# Patient Record
Sex: MALE | Race: WHITE | NOT HISPANIC OR LATINO | Employment: OTHER | URBAN - METROPOLITAN AREA
[De-identification: names, ages, dates, MRNs, and addresses within clinical notes are randomized per-mention and may not be internally consistent; named-entity substitution may affect disease eponyms.]

---

## 2017-01-06 ENCOUNTER — OFFICE VISIT (OUTPATIENT)
Dept: INTERNAL MEDICINE | Facility: CLINIC | Age: 57
End: 2017-01-06

## 2017-01-06 VITALS
HEART RATE: 88 BPM | SYSTOLIC BLOOD PRESSURE: 130 MMHG | WEIGHT: 315 LBS | DIASTOLIC BLOOD PRESSURE: 90 MMHG | OXYGEN SATURATION: 95 % | BODY MASS INDEX: 40.47 KG/M2

## 2017-01-06 DIAGNOSIS — I10 ESSENTIAL HYPERTENSION: ICD-10-CM

## 2017-01-06 DIAGNOSIS — R73.03 PREDIABETES: Primary | ICD-10-CM

## 2017-01-06 DIAGNOSIS — E78.5 DYSLIPIDEMIA: ICD-10-CM

## 2017-01-06 DIAGNOSIS — F41.9 ANXIETY: ICD-10-CM

## 2017-01-06 DIAGNOSIS — K21.9 GASTROESOPHAGEAL REFLUX DISEASE, ESOPHAGITIS PRESENCE NOT SPECIFIED: ICD-10-CM

## 2017-01-06 LAB
GLUCOSE BLDC GLUCOMTR-MCNC: 95 MG/DL (ref 70–130)
HBA1C MFR BLD: 5.9 %

## 2017-01-06 PROCEDURE — 82947 ASSAY GLUCOSE BLOOD QUANT: CPT | Performed by: INTERNAL MEDICINE

## 2017-01-06 PROCEDURE — 99214 OFFICE O/P EST MOD 30 MIN: CPT | Performed by: INTERNAL MEDICINE

## 2017-01-06 PROCEDURE — 83036 HEMOGLOBIN GLYCOSYLATED A1C: CPT | Performed by: INTERNAL MEDICINE

## 2017-01-06 RX ORDER — CIPROFLOXACIN 500 MG/1
TABLET, FILM COATED ORAL
COMMUNITY
Start: 2017-01-05 | End: 2017-02-09

## 2017-01-06 RX ORDER — OMEPRAZOLE 20 MG/1
20 CAPSULE, DELAYED RELEASE ORAL DAILY
Refills: 3 | COMMUNITY
Start: 2016-11-24 | End: 2018-05-23 | Stop reason: SDUPTHER

## 2017-01-06 NOTE — MR AVS SNAPSHOT
Margarito GOEL Ian   1/6/2017 1:45 PM   Office Visit    Dept Phone:  [phone]   Encounter #:  [id number]    Provider:  Francesca Richardson DO   Department:  [facility] INTERNAL MEDICINE AND ENDOCRINOLOGY [place]                Your Full Care Plan              Your Updated Medication List          This list is accurate as of: 1/6/17  1:42 PM.  Always use your most recent med list.                ALPRAZolam 1 MG tablet   Commonly known as:  XANAX   Take 1 tablet by mouth daily.       ciprofloxacin 500 MG tablet   Commonly known as:  CIPRO       citalopram 10 MG tablet   Commonly known as:  CeleXA       cyclobenzaprine 5 MG tablet   Commonly known as:  FLEXERIL   TAKE 1 TABLET AT BEDTIME AS NEEDED.       famciclovir 500 MG tablet   Commonly known as:  FAMVIR   Take 1 tablet by mouth 3 (Three) Times a Day.       HYDROcodone-acetaminophen 7.5-325 MG per tablet   Commonly known as:  NORCO       ketoconazole 2 % shampoo   Commonly known as:  NIZORAL   APPLY TOPICALLY 2 (TWO) TIMES A WEEK.       losartan 100 MG tablet   Commonly known as:  COZAAR   TAKE 1 TABLET BY MOUTH EVERY DAY       meloxicam 7.5 MG tablet   Commonly known as:  MOBIC   TAKE 1 TABLET BY MOUTH EVERY DAY AS NEEDED       * nystatin 942496 UNIT/GM cream   Commonly known as:  MYCOSTATIN       * nystatin [id number] UNIT/GM ointment   Commonly known as:  MYCOSTATIN       omeprazole 20 MG capsule   Commonly known as:  priLOSEC       raNITIdine 150 MG tablet   Commonly known as:  ZANTAC       tamsulosin 0.4 MG capsule 24 hr capsule   Commonly known as:  FLOMAX       VENTOLIN  (90 BASE) MCG/ACT inhaler   Generic drug:  albuterol       * Notice:  This list has 2 medication(s) that are the same as other medications prescribed for you. Read the directions carefully, and ask your doctor or other care provider to review them with you.            We Performed the Following     POC Glucose Fingerstick     POC Glycated Hemoglobin, Total          You Were Diagnosed With        Codes Comments    Prediabetes    -  Primary ICD-10-CM: R73.03  ICD-9-CM: 790.29     Essential hypertension     ICD-10-CM: I10  ICD-9-CM: 401.9     Gastroesophageal reflux disease, esophagitis presence not specified     ICD-10-CM: K21.9  ICD-9-CM: 530.81     Dyslipidemia     ICD-10-CM: E78.5  ICD-9-CM: 272.4     Anxiety     ICD-10-CM: F41.9  ICD-9-CM: 300.00       Instructions     None    Patient Instructions History      Upcoming Appointments     Visit Type Date Time Department    FOLLOW UP 2017  1:45 PM [facility]    FOLLOW UP 2017 12:45 PM [facility]      Appercode Signup     [facility] Appercode allows you to send messages to your doctor, view your test results, renew your prescriptions, schedule appointments, and more. To sign up, go to [employer] and click on the Sign Up Now link in the New User? box. Enter your Appercode Activation Code exactly as it appears below along with the last four digits of your Social Security Number and your Date of Birth () to complete the sign-up process. If you do not sign up before the expiration date, you must request a new code.    Appercode Activation Code: [email or web address]  Expires: 2017  1:42 PM    If you have questions, you can email [email or web address] or call [phone] to talk to our Apperc[employer] staff. Remember, Appercode is NOT to be used for urgent needs. For medical emergencies, dial 911.               Other Info from Your Visit           Your Appointments     2017  1:45 PM EST   Follow Up with Francesca Richardson DO   [facility], [place] INTERNAL MEDICINE AND ENDOCRINOLOGY [place] (--)    [address] [address] [phone]           Arrive 15 minutes prior to appointment.            2017 12:45 PM EDT   Follow Up with Francesca Richardson DO   [facility] [place] [place] [facility] INTERNAL MEDICINE AND ENDOCRINOLOGY [place] (--)    [address] [address]    [phone]           Arrive 15 minutes prior to appointment.              Allergies     Penicillins  Hives      Reason for Visit     Hypertension     Heartburn     Anxiety     Prediabetes LAST A1C 5.3    Dyslipidemia           Vital Signs     Blood Pressure Pulse Weight Oxygen Saturation Body Mass Index Smoking Status    130/90 88 315 lb 3.2 oz (143 kg) 95% 40.47 kg/m2 Former Smoker      Problems and Diagnoses Noted     Anxiety problem    Dyslipidemia    High blood pressure    Acid reflux disease    Prediabetes    -  Primary      Results     POC Glucose Fingerstick      Component Value Standard Range & Units    Glucose 95 70 - 130 mg/dL                POC Glycated Hemoglobin, Total      Component Value Standard Range & Units    Hemoglobin A1C 5.9 %

## 2017-01-06 NOTE — PROGRESS NOTES
Subjective   Margarito Sosa is a 56 y.o. male.   Chief Complaint   Patient presents with   • Hypertension   • Heartburn   • Anxiety   • Prediabetes     LAST A1C 5.3   • Dyslipidemia       Hypertension   This is a chronic problem. The current episode started more than 1 year ago. Associated symptoms include anxiety. Pertinent negatives include no chest pain, headaches, neck pain, palpitations or shortness of breath.   Heartburn   He reports no abdominal pain, no chest pain, no coughing, no nausea, no sore throat or no wheezing. This is a chronic problem. The current episode started more than 1 year ago. Pertinent negatives include no fatigue.   Anxiety   Presents for follow-up visit. Patient reports no chest pain, confusion, decreased concentration, nausea, nervous/anxious behavior, palpitations, restlessness or shortness of breath.        Went to [facility] and dx with otitis media x 2. Now is completing antibiotics.     The following portions of the patient's history were reviewed and updated as appropriate: allergies, current medications, past family history, past medical history, past social history, past surgical history and problem list.    Review of Systems   Constitutional: Negative for activity change, appetite change, chills, diaphoresis, fatigue, fever and unexpected weight change.   HENT: Negative for congestion, ear discharge, ear pain, mouth sores, nosebleeds, sinus pressure, sneezing and sore throat.    Eyes: Negative for pain, discharge and itching.   Respiratory: Negative for cough, chest tightness, shortness of breath and wheezing.    Cardiovascular: Negative for chest pain, palpitations and leg swelling.   Gastrointestinal: Negative for abdominal pain, constipation, diarrhea, nausea and vomiting.   Endocrine: Negative for cold intolerance, heat intolerance, polydipsia and polyphagia.   Genitourinary: Negative for dysuria, flank pain, frequency, hematuria and urgency.   Musculoskeletal:  Negative for arthralgias, back pain, gait problem, myalgias, neck pain and neck stiffness.   Skin: Negative for color change, pallor and rash.   Neurological: Negative for seizures, speech difficulty, numbness and headaches.   Psychiatric/Behavioral: Negative for agitation, confusion, decreased concentration and sleep disturbance. The patient is not nervous/anxious.      Visit Vitals   • /90   • Pulse 88   • Wt (!) 315 lb 3.2 oz (143 kg)   • SpO2 95%   • BMI 40.47 kg/m2       Objective   Physical Exam   Constitutional: He is oriented to person, place, and time. He appears well-developed.   HENT:   Head: Normocephalic.   Right Ear: External ear normal.   Left Ear: External ear normal.   Nose: Nose normal.   Mouth/Throat: Oropharynx is clear and moist.   Eyes: Conjunctivae are normal. Pupils are equal, round, and reactive to light.   Neck: No JVD present. No thyromegaly present.   Cardiovascular: Normal rate, regular rhythm and normal heart sounds.  Exam reveals no friction rub.    No murmur heard.  Pulmonary/Chest: Effort normal and breath sounds normal. No respiratory distress. He has no wheezes. He has no rales.   Abdominal: Soft. Bowel sounds are normal. He exhibits no distension. There is no tenderness. There is no guarding.   Musculoskeletal: He exhibits no edema or tenderness.   Lymphadenopathy:     He has no cervical adenopathy.   Neurological: He is alert and oriented to person, place, and time. He has normal reflexes. He displays normal reflexes. No cranial nerve deficit.   Skin: No rash noted.   Psychiatric: He has a normal mood and affect. His behavior is normal.   Nursing note and vitals reviewed.      Assessment/Plan   Margarito was seen today for hypertension, heartburn, anxiety, prediabetes and dyslipidemia.    Diagnoses and all orders for this visit:    Prediabetes  -     POC Glucose Fingerstick  -     POC Glycated Hemoglobin, Total    Essential hypertension  stable  Gastroesophageal reflux disease,  esophagitis presence not specified  stable  Dyslipidemia  stable  Anxiety  Stable.

## 2017-02-09 ENCOUNTER — OFFICE VISIT (OUTPATIENT)
Dept: INTERNAL MEDICINE | Facility: CLINIC | Age: 57
End: 2017-02-09

## 2017-02-09 VITALS
HEART RATE: 85 BPM | WEIGHT: 309 LBS | OXYGEN SATURATION: 98 % | BODY MASS INDEX: 39.67 KG/M2 | DIASTOLIC BLOOD PRESSURE: 100 MMHG | SYSTOLIC BLOOD PRESSURE: 140 MMHG

## 2017-02-09 DIAGNOSIS — I10 ESSENTIAL HYPERTENSION: Primary | ICD-10-CM

## 2017-02-09 DIAGNOSIS — K21.9 GASTROESOPHAGEAL REFLUX DISEASE, ESOPHAGITIS PRESENCE NOT SPECIFIED: ICD-10-CM

## 2017-02-09 DIAGNOSIS — E78.5 DYSLIPIDEMIA: ICD-10-CM

## 2017-02-09 DIAGNOSIS — F41.9 ANXIETY: ICD-10-CM

## 2017-02-09 DIAGNOSIS — Z11.59 NEED FOR HEPATITIS C SCREENING TEST: ICD-10-CM

## 2017-02-09 LAB
ALBUMIN SERPL-MCNC: 4.4 G/DL (ref 3.2–4.8)
ALBUMIN/GLOB SERPL: 1.6 G/DL (ref 1.5–2.5)
ALP SERPL-CCNC: 70 U/L (ref 25–100)
ALT SERPL W P-5'-P-CCNC: 32 U/L (ref 7–40)
ANION GAP SERPL CALCULATED.3IONS-SCNC: 6 MMOL/L (ref 3–11)
ARTICHOKE IGE QN: 99 MG/DL (ref 0–130)
AST SERPL-CCNC: 26 U/L (ref 0–33)
BILIRUB SERPL-MCNC: 0.8 MG/DL (ref 0.3–1.2)
BUN BLD-MCNC: 13 MG/DL (ref 9–23)
BUN/CREAT SERPL: 13 (ref 7–25)
CALCIUM SPEC-SCNC: 9.8 MG/DL (ref 8.7–10.4)
CHLORIDE SERPL-SCNC: 102 MMOL/L (ref 99–109)
CHOLEST SERPL-MCNC: 163 MG/DL (ref 0–200)
CO2 SERPL-SCNC: 32 MMOL/L (ref 20–31)
CREAT BLD-MCNC: 1 MG/DL (ref 0.6–1.3)
GFR SERPL CREATININE-BSD FRML MDRD: 78 ML/MIN/1.73
GLOBULIN UR ELPH-MCNC: 2.7 GM/DL
GLUCOSE BLD-MCNC: 95 MG/DL (ref 70–100)
HCV AB SER DONR QL: NORMAL
HDLC SERPL-MCNC: 34 MG/DL (ref 40–60)
POTASSIUM BLD-SCNC: 4.5 MMOL/L (ref 3.5–5.5)
PROT SERPL-MCNC: 7.1 G/DL (ref 5.7–8.2)
SODIUM BLD-SCNC: 140 MMOL/L (ref 132–146)
TRIGL SERPL-MCNC: 245 MG/DL (ref 0–150)

## 2017-02-09 PROCEDURE — 80061 LIPID PANEL: CPT | Performed by: INTERNAL MEDICINE

## 2017-02-09 PROCEDURE — 86803 HEPATITIS C AB TEST: CPT | Performed by: INTERNAL MEDICINE

## 2017-02-09 PROCEDURE — 99213 OFFICE O/P EST LOW 20 MIN: CPT | Performed by: INTERNAL MEDICINE

## 2017-02-09 PROCEDURE — 80053 COMPREHEN METABOLIC PANEL: CPT | Performed by: INTERNAL MEDICINE

## 2017-02-09 RX ORDER — ALPRAZOLAM 1 MG/1
1 TABLET ORAL DAILY
Qty: 30 TABLET | Refills: 2 | OUTPATIENT
Start: 2017-02-09 | End: 2017-02-09 | Stop reason: SDUPTHER

## 2017-02-09 RX ORDER — ALPRAZOLAM 1 MG/1
1 TABLET ORAL DAILY
Qty: 30 TABLET | Refills: 2 | Status: SHIPPED | OUTPATIENT
Start: 2017-02-09 | End: 2017-08-31 | Stop reason: SDUPTHER

## 2017-02-09 NOTE — PROGRESS NOTES
Subjective   Margarito Sosa is a 56 y.o. male.     Hypertension   This is a chronic problem. The current episode started more than 1 year ago. Pertinent negatives include no chest pain, headaches, neck pain, palpitations or shortness of breath.   Heartburn   He reports no abdominal pain, no chest pain, no coughing, no nausea, no sore throat or no wheezing. This is a chronic problem. The current episode started more than 1 year ago. Pertinent negatives include no fatigue.   Hyperlipidemia   This is a chronic problem. The current episode started more than 1 year ago. Pertinent negatives include no chest pain, myalgias or shortness of breath. There are no compliance problems.    Anxiety   Presents for follow-up visit. Patient reports no chest pain, confusion, decreased concentration, nausea, nervous/anxious behavior, palpitations or shortness of breath.            The following portions of the patient's history were reviewed and updated as appropriate: allergies, current medications, past family history, past medical history, past social history, past surgical history and problem list.    Review of Systems   Constitutional: Negative for activity change, appetite change, chills, diaphoresis, fatigue, fever and unexpected weight change.   HENT: Negative for congestion, ear discharge, ear pain, mouth sores, nosebleeds, sinus pressure, sneezing and sore throat.    Eyes: Negative for pain, discharge and itching.   Respiratory: Negative for cough, chest tightness, shortness of breath and wheezing.    Cardiovascular: Negative for chest pain, palpitations and leg swelling.   Gastrointestinal: Negative for abdominal pain, constipation, diarrhea, nausea and vomiting.   Endocrine: Negative for cold intolerance, heat intolerance, polydipsia and polyphagia.   Genitourinary: Negative for dysuria, flank pain, frequency, hematuria and urgency.   Musculoskeletal: Negative for arthralgias, back pain, gait problem, myalgias, neck pain  and neck stiffness.        Hx chronic back and knee pain   Skin: Negative for color change, pallor and rash.   Neurological: Negative for seizures, speech difficulty, numbness and headaches.   Psychiatric/Behavioral: Negative for agitation, confusion, decreased concentration and sleep disturbance. The patient is not nervous/anxious.      Visit Vitals   • /100   • Pulse 85   • Wt (!) 309 lb (140 kg)   • SpO2 98%   • BMI 39.67 kg/m2         Objective   Physical Exam   Constitutional: He is oriented to person, place, and time. He appears well-developed.   HENT:   Head: Normocephalic.   Right Ear: External ear normal.   Left Ear: External ear normal.   Nose: Nose normal.   Mouth/Throat: Oropharynx is clear and moist.   Eyes: Conjunctivae are normal. Pupils are equal, round, and reactive to light.   Neck: No JVD present. No thyromegaly present.   Cardiovascular: Normal rate, regular rhythm and normal heart sounds.  Exam reveals no friction rub.    No murmur heard.  Pulmonary/Chest: Effort normal and breath sounds normal. No respiratory distress. He has no wheezes. He has no rales.   Abdominal: Soft. Bowel sounds are normal. He exhibits no distension. There is no tenderness. There is no guarding.   Musculoskeletal: He exhibits no edema.   Lymphadenopathy:     He has no cervical adenopathy.   Neurological: He is oriented to person, place, and time. No cranial nerve deficit.   Skin: No rash noted.   Psychiatric: His behavior is normal.   Nursing note and vitals reviewed.      Assessment/Plan   Diagnoses and all orders for this visit:    Essential hypertension  Bp is up today. Needs to take meds. He is going to work on [employer] exercise and weight loss  Gastroesophageal reflux disease, esophagitis presence not specified  stable  Dyslipidemia  stable  Anxiety  Stable. Needs refill on xanax  Has disability from back doctor.  Needs hep c screen            The patient has read and signed the [facility] Controlled  Substance Contract.  I will continue to see patient for regular follow up appointments.  They are well controlled on their medication.  GENEVA is updated every 3 months. The patient is aware of the potential for addiction and dependence.    The patient has read and signed the [facility] Substance Contract.  I will continue to see patient for regular follow up appointments.  They are well controlled on their medication.  GENEVA is updated every 3 months. The patient is aware of the potential for addiction. The patient has read and signed the [facility] Substance Contract.  I will continue to see patient for regular follow up appointments.  They are well controlled on their medication.  GENEVA is updated every 3 months. The patient is aware of the potential for addiction and dependence.on and dependence.

## 2017-04-20 ENCOUNTER — OFFICE VISIT (OUTPATIENT)
Dept: INTERNAL MEDICINE | Facility: CLINIC | Age: 57
End: 2017-04-20

## 2017-04-20 VITALS
TEMPERATURE: 97.7 F | DIASTOLIC BLOOD PRESSURE: 90 MMHG | OXYGEN SATURATION: 95 % | HEART RATE: 78 BPM | WEIGHT: 314 LBS | SYSTOLIC BLOOD PRESSURE: 124 MMHG | BODY MASS INDEX: 40.32 KG/M2

## 2017-04-20 DIAGNOSIS — J02.9 ACUTE PHARYNGITIS, UNSPECIFIED ETIOLOGY: ICD-10-CM

## 2017-04-20 DIAGNOSIS — Z20.2 POSSIBLE EXPOSURE TO STD: ICD-10-CM

## 2017-04-20 DIAGNOSIS — J02.9 SORE THROAT: ICD-10-CM

## 2017-04-20 DIAGNOSIS — F41.9 ANXIETY: Primary | ICD-10-CM

## 2017-04-20 DIAGNOSIS — Z79.899 HIGH RISK MEDICATION USE: ICD-10-CM

## 2017-04-20 LAB
EXPIRATION DATE: NORMAL
HIV1+2 AB SER QL: NORMAL
INTERNAL CONTROL: NORMAL
Lab: NORMAL
S PYO AG THROAT QL: NEGATIVE

## 2017-04-20 PROCEDURE — 87880 STREP A ASSAY W/OPTIC: CPT | Performed by: INTERNAL MEDICINE

## 2017-04-20 PROCEDURE — G0432 EIA HIV-1/HIV-2 SCREEN: HCPCS | Performed by: INTERNAL MEDICINE

## 2017-04-20 PROCEDURE — 99213 OFFICE O/P EST LOW 20 MIN: CPT | Performed by: INTERNAL MEDICINE

## 2017-04-20 PROCEDURE — 86592 SYPHILIS TEST NON-TREP QUAL: CPT | Performed by: INTERNAL MEDICINE

## 2017-04-20 PROCEDURE — 87591 N.GONORRHOEAE DNA AMP PROB: CPT | Performed by: INTERNAL MEDICINE

## 2017-04-20 PROCEDURE — 87491 CHLMYD TRACH DNA AMP PROBE: CPT | Performed by: INTERNAL MEDICINE

## 2017-04-20 NOTE — PROGRESS NOTES
Subjective   Margarito Sosa is a 56 y.o. male.   Chief Complaint   Patient presents with   • Sore Throat       History of Present Illness   3 day j\history of sore throat. No fever or chills. No nausea or vomiting. No diarrhea.  Anxiety x years. On xanax x years.  Concerned had unprotected intercourse.  The following portions of the patient's history were reviewed and updated as appropriate: allergies, current medications, past family history, past medical history, past social history, past surgical history and problem list.    Review of Systems   Constitutional: Negative for activity change, appetite change, chills, diaphoresis, fatigue, fever and unexpected weight change.   HENT: Positive for sore throat. Negative for congestion, ear discharge, ear pain, mouth sores, nosebleeds, sinus pressure and sneezing.    Eyes: Negative for pain, discharge and itching.   Respiratory: Negative for cough, chest tightness, shortness of breath and wheezing.    Cardiovascular: Negative for chest pain, palpitations and leg swelling.   Gastrointestinal: Negative for abdominal pain, constipation, diarrhea, nausea and vomiting.   Endocrine: Negative for cold intolerance, heat intolerance, polydipsia and polyphagia.   Genitourinary: Negative for dysuria, flank pain, frequency, hematuria and urgency.   Musculoskeletal: Negative for arthralgias, back pain, gait problem, myalgias, neck pain and neck stiffness.   Skin: Negative for color change, pallor and rash.   Neurological: Negative for seizures, speech difficulty, numbness and headaches.   Psychiatric/Behavioral: Negative for agitation, confusion, decreased concentration and sleep disturbance. The patient is nervous/anxious.      /90  Pulse 78  Temp 97.7 °F (36.5 °C)  Wt (!) 314 lb (142 kg)  SpO2 95%  BMI 40.32 kg/m2    Objective   Physical Exam   Constitutional: He appears well-developed.   HENT:   Head: Normocephalic.   Right Ear: External ear normal.   Left Ear:  External ear normal.   Nose: Nose normal.   Mouth/Throat: Oropharynx is clear and moist.   Eyes: Conjunctivae are normal. Pupils are equal, round, and reactive to light.   Neck: No JVD present. No thyromegaly present.   Cardiovascular: Normal rate, regular rhythm and normal heart sounds.  Exam reveals no friction rub.    No murmur heard.  Pulmonary/Chest: Effort normal and breath sounds normal. No respiratory distress. He has no wheezes. He has no rales.   Abdominal: Soft. Bowel sounds are normal. He exhibits no distension. There is no tenderness. There is no guarding.   Musculoskeletal: He exhibits no edema or tenderness.   Lymphadenopathy:     He has no cervical adenopathy.   Neurological: He displays normal reflexes. No cranial nerve deficit.   Skin: No rash noted.   Psychiatric: His behavior is normal.   Nursing note and vitals reviewed.      Assessment/Plan   Margarito was seen today for sore throat.    Diagnoses and all orders for this visit:    Anxiety  Only med that helps is xanax. Tried SSRI's and did not help. Unable to wean. uds today  Acute pharyngitis, unspecified etiology  Strep negative.    The patient has read and signed the [facility] [employer] Substance Contract.  I will continue to see patient for regular follow up appointments.  They are well controlled on their medication.  GENEVA is updated every 3 months. The patient is aware of the potential for addiction and dependence.    The patient has read and signed the [facility] Controlled Substance Contract.  I will continue to see patient for regular follow up appointments.  They are well controlled on their medication.  GENEVA is updated every 3 months. The patient is aware of the potential for addiction. The patient has read and signed the [facility] Controlled Substance Contract.  I will continue to see patient for regular follow up appointments.  They are well controlled on their medication.  GENEVA is updated every 3 months. The  patient is aware of the potential for addiction and dependence.on and dependence.    Ortho is writing for his lortab .

## 2017-04-21 LAB — RPR SER QL: NORMAL

## 2017-04-22 LAB
C TRACH RRNA SPEC DONR QL NAA+PROBE: NEGATIVE
N GONORRHOEA DNA SPEC QL NAA+PROBE: NEGATIVE

## 2017-05-08 RX ORDER — LOSARTAN POTASSIUM 100 MG/1
TABLET ORAL
Qty: 90 TABLET | Refills: 1 | Status: SHIPPED | OUTPATIENT
Start: 2017-05-08 | End: 2017-11-01 | Stop reason: SDUPTHER

## 2017-07-07 ENCOUNTER — OFFICE VISIT (OUTPATIENT)
Dept: INTERNAL MEDICINE | Facility: CLINIC | Age: 57
End: 2017-07-07

## 2017-07-07 VITALS
HEART RATE: 82 BPM | WEIGHT: 311.3 LBS | SYSTOLIC BLOOD PRESSURE: 130 MMHG | DIASTOLIC BLOOD PRESSURE: 90 MMHG | OXYGEN SATURATION: 95 % | BODY MASS INDEX: 39.97 KG/M2

## 2017-07-07 DIAGNOSIS — R73.02 IMPAIRED GLUCOSE TOLERANCE: Primary | ICD-10-CM

## 2017-07-07 DIAGNOSIS — E78.5 DYSLIPIDEMIA: ICD-10-CM

## 2017-07-07 DIAGNOSIS — K21.9 GASTROESOPHAGEAL REFLUX DISEASE, ESOPHAGITIS PRESENCE NOT SPECIFIED: ICD-10-CM

## 2017-07-07 DIAGNOSIS — F41.9 ANXIETY: ICD-10-CM

## 2017-07-07 DIAGNOSIS — I10 ESSENTIAL HYPERTENSION: ICD-10-CM

## 2017-07-07 DIAGNOSIS — F32.89 OTHER DEPRESSION: ICD-10-CM

## 2017-07-07 DIAGNOSIS — Z12.5 PROSTATE CANCER SCREENING: ICD-10-CM

## 2017-07-07 LAB
ALBUMIN SERPL-MCNC: 4.2 G/DL (ref 3.2–4.8)
ALBUMIN/GLOB SERPL: 1.6 G/DL (ref 1.5–2.5)
ALP SERPL-CCNC: 72 U/L (ref 25–100)
ALT SERPL W P-5'-P-CCNC: 51 U/L (ref 7–40)
ANION GAP SERPL CALCULATED.3IONS-SCNC: 7 MMOL/L (ref 3–11)
ARTICHOKE IGE QN: 79 MG/DL (ref 0–130)
AST SERPL-CCNC: 37 U/L (ref 0–33)
BILIRUB SERPL-MCNC: 0.7 MG/DL (ref 0.3–1.2)
BUN BLD-MCNC: 16 MG/DL (ref 9–23)
BUN/CREAT SERPL: 17.8 (ref 7–25)
CALCIUM SPEC-SCNC: 9.4 MG/DL (ref 8.7–10.4)
CHLORIDE SERPL-SCNC: 103 MMOL/L (ref 99–109)
CHOLEST SERPL-MCNC: 150 MG/DL (ref 0–200)
CO2 SERPL-SCNC: 28 MMOL/L (ref 20–31)
CREAT BLD-MCNC: 0.9 MG/DL (ref 0.6–1.3)
GFR SERPL CREATININE-BSD FRML MDRD: 88 ML/MIN/1.73
GLOBULIN UR ELPH-MCNC: 2.6 GM/DL
GLUCOSE BLD-MCNC: 98 MG/DL (ref 70–100)
GLUCOSE BLDC GLUCOMTR-MCNC: 100 MG/DL (ref 70–130)
HBA1C MFR BLD: 6 %
HDLC SERPL-MCNC: 28 MG/DL (ref 40–60)
POTASSIUM BLD-SCNC: 4.2 MMOL/L (ref 3.5–5.5)
PROT SERPL-MCNC: 6.8 G/DL (ref 5.7–8.2)
PSA SERPL-MCNC: 1.84 NG/ML (ref 0–4)
SODIUM BLD-SCNC: 138 MMOL/L (ref 132–146)
TRIGL SERPL-MCNC: 274 MG/DL (ref 0–150)

## 2017-07-07 PROCEDURE — 80061 LIPID PANEL: CPT | Performed by: INTERNAL MEDICINE

## 2017-07-07 PROCEDURE — 99214 OFFICE O/P EST MOD 30 MIN: CPT | Performed by: INTERNAL MEDICINE

## 2017-07-07 PROCEDURE — 84153 ASSAY OF PSA TOTAL: CPT | Performed by: INTERNAL MEDICINE

## 2017-07-07 PROCEDURE — 82947 ASSAY GLUCOSE BLOOD QUANT: CPT | Performed by: INTERNAL MEDICINE

## 2017-07-07 PROCEDURE — 80053 COMPREHEN METABOLIC PANEL: CPT | Performed by: INTERNAL MEDICINE

## 2017-07-07 PROCEDURE — 83036 HEMOGLOBIN GLYCOSYLATED A1C: CPT | Performed by: INTERNAL MEDICINE

## 2017-07-07 RX ORDER — CYCLOBENZAPRINE HCL 5 MG
5 TABLET ORAL 3 TIMES DAILY PRN
Qty: 30 TABLET | Refills: 0 | Status: SHIPPED | OUTPATIENT
Start: 2017-07-07 | End: 2017-08-31 | Stop reason: SDUPTHER

## 2017-07-07 RX ORDER — AMLODIPINE BESYLATE 5 MG/1
5 TABLET ORAL DAILY
Qty: 30 TABLET | Refills: 5 | Status: SHIPPED | OUTPATIENT
Start: 2017-07-07 | End: 2017-12-20 | Stop reason: SDUPTHER

## 2017-07-07 RX ORDER — FLUTICASONE PROPIONATE 50 MCG
2 SPRAY, SUSPENSION (ML) NASAL DAILY PRN
Refills: 11 | COMMUNITY
Start: 2017-05-12 | End: 2020-10-12

## 2017-07-07 RX ORDER — AZELASTINE 1 MG/ML
2 SPRAY, METERED NASAL 2 TIMES DAILY
Refills: 11 | COMMUNITY
Start: 2017-05-12 | End: 2020-10-12

## 2017-07-07 RX ORDER — BUDESONIDE 180 UG/1
1 AEROSOL, POWDER RESPIRATORY (INHALATION) 2 TIMES DAILY PRN
Refills: 5 | COMMUNITY
Start: 2017-05-19 | End: 2020-05-28 | Stop reason: SDUPTHER

## 2017-07-07 NOTE — PROGRESS NOTES
Subjective   Margarito Sosa is a 56 y.o. male.     Hypertension   This is a chronic problem. The current episode started more than 1 year ago. Pertinent negatives include no chest pain, headaches, neck pain, palpitations or shortness of breath. Compliance problems include diet and exercise.    Heartburn   He reports no abdominal pain, no chest pain, no coughing, no nausea, no sore throat or no wheezing. This is a chronic problem. The current episode started more than 1 year ago. Pertinent negatives include no fatigue.   Depression   Visit Type: follow-up  Patient is not experiencing: confusion, decreased concentration, nervousness/anxiety, palpitations, shortness of breath, suicidal planning, thoughts of death and weight gain.         The following portions of the patient's history were reviewed and updated as appropriate: allergies, current medications, past family history, past medical history, past social history, past surgical history and problem list.    Review of Systems   Constitutional: Negative for activity change, appetite change, chills, diaphoresis, fatigue, fever, unexpected weight change and weight gain.   HENT: Negative for congestion, ear discharge, ear pain, mouth sores, nosebleeds, sinus pressure, sneezing and sore throat.    Eyes: Negative for pain, discharge and itching.   Respiratory: Negative for cough, chest tightness, shortness of breath and wheezing.    Cardiovascular: Negative for chest pain, palpitations and leg swelling.   Gastrointestinal: Negative for abdominal pain, constipation, diarrhea, nausea and vomiting.   Endocrine: Negative for cold intolerance, heat intolerance, polydipsia and polyphagia.   Genitourinary: Negative for dysuria, flank pain, frequency, hematuria and urgency.   Musculoskeletal: Negative for arthralgias, back pain, gait problem, myalgias, neck pain and neck stiffness.        Chronic back pain     Skin: Negative for color change, pallor and rash.   Neurological:  Negative for seizures, speech difficulty, numbness and headaches.   Psychiatric/Behavioral: Negative for agitation, confusion, decreased concentration and sleep disturbance. The patient is not nervous/anxious.      /90  Pulse 82  Wt (!) 311 lb 4.8 oz (141 kg)  SpO2 95%  BMI 39.97 kg/m2    Objective   Physical Exam   Constitutional: He appears well-developed.   HENT:   Head: Normocephalic.   Right Ear: External ear normal.   Left Ear: External ear normal.   Nose: Nose normal.   Mouth/Throat: Oropharynx is clear and moist.   Eyes: Conjunctivae are normal. Pupils are equal, round, and reactive to light.   Neck: No JVD present. No thyromegaly present.   Cardiovascular: Normal rate, regular rhythm and normal heart sounds.  Exam reveals no friction rub.    No murmur heard.  Pulmonary/Chest: Effort normal and breath sounds normal. No respiratory distress. He has no wheezes. He has no rales.   Abdominal: Soft. Bowel sounds are normal. He exhibits no distension. There is no tenderness. There is no guarding.   Musculoskeletal: He exhibits no edema or tenderness.   Lymphadenopathy:     He has no cervical adenopathy.   Neurological: He displays normal reflexes. No cranial nerve deficit.   Skin: No rash noted.   Psychiatric: His behavior is normal.   Nursing note and vitals reviewed.      Assessment/Plan   Margarito was seen today for hypertension, heartburn, depression and prediabetes.    Diagnoses and all orders for this visit:    Impaired glucose tolerance  -     POC Glucose Fingerstick  -     POC Glycosylated Hemoglobin (Hb A1C)    Essential hypertension  -     Comprehensive Metabolic Panel  -     Lipid Panel  Added norvasc 5 mg po qd  Gastroesophageal reflux disease, esophagitis presence not specified  stable  Dyslipidemia  -     Comprehensive Metabolic Panel  -     Lipid Panel    Other depression  stable  Anxiety  Has been on xanax x years and unable to wean.  Prostate cancer screening  -     PSA    Other orders  -      amLODIPine (NORVASC) 5 MG tablet; Take 1 tablet by mouth Daily.  -     cyclobenzaprine (FLEXERIL) 5 MG tablet; Take 1 tablet by mouth 3 (Three) Times a Day As Needed (back pain).        The patient has read and signed the [facility] Triage Substance Contract.  I will continue to see patient for regular follow up appointments.  They are well controlled on their medication.  GENEVA is updated every 3 months. The patient is aware of the potential for addiction and dependence.    The patient has read and signed the [facility] Triage Substance Contract.  I will continue to see patient for regular follow up appointments.  They are well controlled on their medication.  GENEVA is updated every 3 months. The patient is aware of the potential for addiction. The patient has read and signed the [facility] Triage Substance Contract.  I will continue to see patient for regular follow up appointments.  They are well controlled on their medication.  GENEVA is updated every 3 months. The patient is aware of the potential for addiction and dependence.on and dependence.

## 2017-07-11 DIAGNOSIS — R74.8 ELEVATED LIVER ENZYMES: Primary | ICD-10-CM

## 2017-07-17 RX ORDER — MELOXICAM 7.5 MG/1
TABLET ORAL
Qty: 90 TABLET | Refills: 1 | Status: SHIPPED | OUTPATIENT
Start: 2017-07-17 | End: 2018-01-08 | Stop reason: SDUPTHER

## 2017-07-25 ENCOUNTER — TELEPHONE (OUTPATIENT)
Dept: INTERNAL MEDICINE | Facility: CLINIC | Age: 57
End: 2017-07-25

## 2017-07-27 RX ORDER — FAMCICLOVIR 500 MG/1
500 TABLET ORAL 3 TIMES DAILY
Qty: 7 TABLET | Refills: 0 | OUTPATIENT
Start: 2017-07-27 | End: 2017-08-03

## 2017-08-31 ENCOUNTER — LAB (OUTPATIENT)
Dept: INTERNAL MEDICINE | Facility: CLINIC | Age: 57
End: 2017-08-31

## 2017-08-31 DIAGNOSIS — F41.9 ANXIETY: ICD-10-CM

## 2017-08-31 DIAGNOSIS — R74.8 ELEVATED LIVER ENZYMES: ICD-10-CM

## 2017-08-31 LAB
ALBUMIN SERPL-MCNC: 4.2 G/DL (ref 3.2–4.8)
ALBUMIN/GLOB SERPL: 1.6 G/DL (ref 1.5–2.5)
ALP SERPL-CCNC: 71 U/L (ref 25–100)
ALT SERPL W P-5'-P-CCNC: 51 U/L (ref 7–40)
ANION GAP SERPL CALCULATED.3IONS-SCNC: 3 MMOL/L (ref 3–11)
AST SERPL-CCNC: 27 U/L (ref 0–33)
BILIRUB SERPL-MCNC: 0.6 MG/DL (ref 0.3–1.2)
BUN BLD-MCNC: 19 MG/DL (ref 9–23)
BUN/CREAT SERPL: 21.1 (ref 7–25)
CALCIUM SPEC-SCNC: 9.2 MG/DL (ref 8.7–10.4)
CHLORIDE SERPL-SCNC: 105 MMOL/L (ref 99–109)
CO2 SERPL-SCNC: 31 MMOL/L (ref 20–31)
CREAT BLD-MCNC: 0.9 MG/DL (ref 0.6–1.3)
GFR SERPL CREATININE-BSD FRML MDRD: 88 ML/MIN/1.73
GLOBULIN UR ELPH-MCNC: 2.6 GM/DL
GLUCOSE BLD-MCNC: 110 MG/DL (ref 70–100)
POTASSIUM BLD-SCNC: 4.1 MMOL/L (ref 3.5–5.5)
PROT SERPL-MCNC: 6.8 G/DL (ref 5.7–8.2)
SODIUM BLD-SCNC: 139 MMOL/L (ref 132–146)

## 2017-08-31 PROCEDURE — 80053 COMPREHEN METABOLIC PANEL: CPT | Performed by: INTERNAL MEDICINE

## 2017-08-31 RX ORDER — CYCLOBENZAPRINE HCL 5 MG
5 TABLET ORAL 3 TIMES DAILY PRN
Qty: 60 TABLET | Refills: 0 | Status: SHIPPED | OUTPATIENT
Start: 2017-08-31 | End: 2017-09-29 | Stop reason: SDUPTHER

## 2017-08-31 RX ORDER — ALPRAZOLAM 1 MG/1
1 TABLET ORAL DAILY
Qty: 30 TABLET | Refills: 2 | Status: SHIPPED | OUTPATIENT
Start: 2017-08-31 | End: 2018-02-21 | Stop reason: SDUPTHER

## 2017-09-11 RX ORDER — FAMCICLOVIR 500 MG/1
TABLET ORAL
Qty: 21 TABLET | Refills: 0 | Status: SHIPPED | OUTPATIENT
Start: 2017-09-11 | End: 2017-11-09

## 2017-09-29 ENCOUNTER — TELEPHONE (OUTPATIENT)
Dept: ENDOCRINOLOGY | Facility: CLINIC | Age: 57
End: 2017-09-29

## 2017-09-29 RX ORDER — CYCLOBENZAPRINE HCL 5 MG
5 TABLET ORAL 3 TIMES DAILY PRN
Qty: 90 TABLET | Refills: 0 | Status: SHIPPED | OUTPATIENT
Start: 2017-09-29 | End: 2018-11-26 | Stop reason: SDUPTHER

## 2017-09-29 RX ORDER — CYCLOBENZAPRINE HCL 5 MG
TABLET ORAL
Qty: 60 TABLET | Refills: 0 | Status: SHIPPED | OUTPATIENT
Start: 2017-09-29 | End: 2017-09-29 | Stop reason: SDUPTHER

## 2017-09-29 NOTE — TELEPHONE ENCOUNTER
PT CALLED AND WAS WONDERING IF YOU COULD CALL IN A REFILL FOR HIS MEDICATION: FLEXERIL 5MG

## 2017-10-23 ENCOUNTER — APPOINTMENT (OUTPATIENT)
Dept: GENERAL RADIOLOGY | Facility: HOSPITAL | Age: 57
End: 2017-10-23

## 2017-10-23 ENCOUNTER — OFFICE VISIT (OUTPATIENT)
Dept: INTERNAL MEDICINE | Facility: CLINIC | Age: 57
End: 2017-10-23

## 2017-10-23 ENCOUNTER — HOSPITAL ENCOUNTER (EMERGENCY)
Facility: HOSPITAL | Age: 57
Discharge: HOME | End: 2017-10-23
Attending: EMERGENCY MEDICINE | Admitting: EMERGENCY MEDICINE

## 2017-10-23 VITALS
HEART RATE: 80 BPM | BODY MASS INDEX: 41.19 KG/M2 | DIASTOLIC BLOOD PRESSURE: 78 MMHG | RESPIRATION RATE: 18 BRPM | WEIGHT: 315 LBS | SYSTOLIC BLOOD PRESSURE: 116 MMHG

## 2017-10-23 VITALS
WEIGHT: 315 LBS | SYSTOLIC BLOOD PRESSURE: 126 MMHG | HEART RATE: 68 BPM | BODY MASS INDEX: 40.43 KG/M2 | HEIGHT: 74 IN | OXYGEN SATURATION: 98 % | DIASTOLIC BLOOD PRESSURE: 92 MMHG | RESPIRATION RATE: 20 BRPM | TEMPERATURE: 97.6 F

## 2017-10-23 DIAGNOSIS — E66.09 OBESITY DUE TO EXCESS CALORIES WITHOUT SERIOUS COMORBIDITY, UNSPECIFIED CLASSIFICATION: ICD-10-CM

## 2017-10-23 DIAGNOSIS — E78.5 DYSLIPIDEMIA: ICD-10-CM

## 2017-10-23 DIAGNOSIS — R42 DIZZINESS: ICD-10-CM

## 2017-10-23 DIAGNOSIS — I47.1 SVT (SUPRAVENTRICULAR TACHYCARDIA) (HCC): Primary | ICD-10-CM

## 2017-10-23 DIAGNOSIS — R00.0 TACHYCARDIA WITH GREATER THAN 160 BEATS PER MINUTE: Primary | ICD-10-CM

## 2017-10-23 DIAGNOSIS — R73.02 IMPAIRED GLUCOSE TOLERANCE: ICD-10-CM

## 2017-10-23 DIAGNOSIS — I10 ESSENTIAL HYPERTENSION: ICD-10-CM

## 2017-10-23 LAB
ALBUMIN SERPL-MCNC: 4.1 G/DL (ref 3.2–4.8)
ALBUMIN/GLOB SERPL: 1.5 G/DL (ref 1.5–2.5)
ALP SERPL-CCNC: 71 U/L (ref 25–100)
ALT SERPL W P-5'-P-CCNC: 45 U/L (ref 7–40)
ANION GAP SERPL CALCULATED.3IONS-SCNC: 4 MMOL/L (ref 3–11)
AST SERPL-CCNC: 27 U/L (ref 0–33)
BASOPHILS # BLD AUTO: 0.03 10*3/MM3 (ref 0–0.2)
BASOPHILS NFR BLD AUTO: 0.5 % (ref 0–1)
BILIRUB SERPL-MCNC: 0.8 MG/DL (ref 0.3–1.2)
BUN BLD-MCNC: 17 MG/DL (ref 9–23)
BUN/CREAT SERPL: 18.9 (ref 7–25)
CALCIUM SPEC-SCNC: 8.9 MG/DL (ref 8.7–10.4)
CHLORIDE SERPL-SCNC: 105 MMOL/L (ref 99–109)
CO2 SERPL-SCNC: 29 MMOL/L (ref 20–31)
CREAT BLD-MCNC: 0.9 MG/DL (ref 0.6–1.3)
DEPRECATED RDW RBC AUTO: 42.7 FL (ref 37–54)
EOSINOPHIL # BLD AUTO: 0.12 10*3/MM3 (ref 0–0.3)
EOSINOPHIL NFR BLD AUTO: 2.2 % (ref 0–3)
ERYTHROCYTE [DISTWIDTH] IN BLOOD BY AUTOMATED COUNT: 13.3 % (ref 11.3–14.5)
GFR SERPL CREATININE-BSD FRML MDRD: 88 ML/MIN/1.73
GLOBULIN UR ELPH-MCNC: 2.7 GM/DL
GLUCOSE BLD-MCNC: 106 MG/DL (ref 70–100)
GLUCOSE BLDC GLUCOMTR-MCNC: 121 MG/DL (ref 70–130)
HBA1C MFR BLD: 5.7 %
HCT VFR BLD AUTO: 41.3 % (ref 38.9–50.9)
HGB BLD-MCNC: 14.1 G/DL (ref 13.1–17.5)
HOLD SPECIMEN: NORMAL
HOLD SPECIMEN: NORMAL
IMM GRANULOCYTES # BLD: 0 10*3/MM3 (ref 0–0.03)
IMM GRANULOCYTES NFR BLD: 0 % (ref 0–0.6)
LYMPHOCYTES # BLD AUTO: 2.18 10*3/MM3 (ref 0.6–4.8)
LYMPHOCYTES NFR BLD AUTO: 39.5 % (ref 24–44)
MAGNESIUM SERPL-MCNC: 2.2 MG/DL (ref 1.3–2.7)
MCH RBC QN AUTO: 29.9 PG (ref 27–31)
MCHC RBC AUTO-ENTMCNC: 34.1 G/DL (ref 32–36)
MCV RBC AUTO: 87.7 FL (ref 80–99)
MONOCYTES # BLD AUTO: 0.68 10*3/MM3 (ref 0–1)
MONOCYTES NFR BLD AUTO: 12.3 % (ref 0–12)
NEUTROPHILS # BLD AUTO: 2.51 10*3/MM3 (ref 1.5–8.3)
NEUTROPHILS NFR BLD AUTO: 45.5 % (ref 41–71)
PLATELET # BLD AUTO: 167 10*3/MM3 (ref 150–450)
PMV BLD AUTO: 9.4 FL (ref 6–12)
POTASSIUM BLD-SCNC: 3.8 MMOL/L (ref 3.5–5.5)
PROT SERPL-MCNC: 6.8 G/DL (ref 5.7–8.2)
RBC # BLD AUTO: 4.71 10*6/MM3 (ref 4.2–5.76)
SODIUM BLD-SCNC: 138 MMOL/L (ref 132–146)
TROPONIN I SERPL-MCNC: 0 NG/ML (ref 0–0.07)
TROPONIN I SERPL-MCNC: 0.02 NG/ML (ref 0–0.07)
WBC NRBC COR # BLD: 5.52 10*3/MM3 (ref 3.5–10.8)
WHOLE BLOOD HOLD SPECIMEN: NORMAL
WHOLE BLOOD HOLD SPECIMEN: NORMAL

## 2017-10-23 PROCEDURE — 85025 COMPLETE CBC W/AUTO DIFF WBC: CPT | Performed by: EMERGENCY MEDICINE

## 2017-10-23 PROCEDURE — 90656 IIV3 VACC NO PRSV 0.5 ML IM: CPT | Performed by: NURSE PRACTITIONER

## 2017-10-23 PROCEDURE — 99284 EMERGENCY DEPT VISIT MOD MDM: CPT

## 2017-10-23 PROCEDURE — 82962 GLUCOSE BLOOD TEST: CPT | Performed by: NURSE PRACTITIONER

## 2017-10-23 PROCEDURE — 96374 THER/PROPH/DIAG INJ IV PUSH: CPT

## 2017-10-23 PROCEDURE — 80053 COMPREHEN METABOLIC PANEL: CPT | Performed by: EMERGENCY MEDICINE

## 2017-10-23 PROCEDURE — 25010000002 ADENOSINE PER 6 MG

## 2017-10-23 PROCEDURE — 93005 ELECTROCARDIOGRAM TRACING: CPT

## 2017-10-23 PROCEDURE — 93005 ELECTROCARDIOGRAM TRACING: CPT | Performed by: EMERGENCY MEDICINE

## 2017-10-23 PROCEDURE — 99213 OFFICE O/P EST LOW 20 MIN: CPT | Performed by: NURSE PRACTITIONER

## 2017-10-23 PROCEDURE — 90471 IMMUNIZATION ADMIN: CPT | Performed by: NURSE PRACTITIONER

## 2017-10-23 PROCEDURE — 83036 HEMOGLOBIN GLYCOSYLATED A1C: CPT | Performed by: NURSE PRACTITIONER

## 2017-10-23 PROCEDURE — 84484 ASSAY OF TROPONIN QUANT: CPT

## 2017-10-23 PROCEDURE — 92960 CARDIOVERSION ELECTRIC EXT: CPT | Performed by: EMERGENCY MEDICINE

## 2017-10-23 PROCEDURE — 83735 ASSAY OF MAGNESIUM: CPT | Performed by: EMERGENCY MEDICINE

## 2017-10-23 PROCEDURE — 71010 HC CHEST PA OR AP: CPT

## 2017-10-23 PROCEDURE — 93000 ELECTROCARDIOGRAM COMPLETE: CPT | Performed by: NURSE PRACTITIONER

## 2017-10-23 RX ORDER — SODIUM CHLORIDE 0.9 % (FLUSH) 0.9 %
10 SYRINGE (ML) INJECTION AS NEEDED
Status: DISCONTINUED | OUTPATIENT
Start: 2017-10-23 | End: 2017-10-23 | Stop reason: HOSPADM

## 2017-10-23 RX ORDER — ADENOSINE 3 MG/ML
12 INJECTION, SOLUTION INTRAVENOUS ONCE
Status: COMPLETED | OUTPATIENT
Start: 2017-10-23 | End: 2017-10-23

## 2017-10-23 RX ORDER — ADENOSINE 3 MG/ML
INJECTION, SOLUTION INTRAVENOUS
Status: COMPLETED
Start: 2017-10-23 | End: 2017-10-23

## 2017-10-23 RX ORDER — IPRATROPIUM BROMIDE 42 UG/1
1 SPRAY, METERED NASAL DAILY PRN
Status: ON HOLD | COMMUNITY
Start: 2017-10-23 | End: 2020-10-14

## 2017-10-23 RX ADMIN — ADENOSINE 12 MG: 3 INJECTION, SOLUTION INTRAVENOUS at 15:47

## 2017-10-23 NOTE — PROGRESS NOTES
Chief Complaint   Patient presents with   • Chest Pain     Having chest pains, pt stated it feels like tightness   • Fatigue     Pt stated he is very tired. He stated sometimes he feels like he could just pass out.   • Tingling     Tingling in hands and swelling in legs and feet. (Diabetes)        HPI  Margarito Sosa is a 57 y.o. male  presents with complaint of chest tightness which he states is constant;.  He also c/o weight gain and increased fatigue over past 6 months. His weight on 1/6/17 visit= 315 lbs; today, his weight= 320 lbs.; he has also had increased swelling of BLE over the past 2 years.    He was at his ENT office today for a procedure and was feeling dizzy,  His HR was 166; he c/o tingling in hands and feeling weak; his ENT felt he should be seen in our office today.    He reports that he has had these symptoms intermittently for a couple of years, but no irregular heart rhythm has been found; the episodes have increased in frequency over the past few weeks. The episodes consist of dizziness, increased heart rate, tingling in hands, feeling very weak.  He denies ever losing consciousness, chest pain, shortness of breath, pain in arm, neck, jaw, n/v.    Past Medical History:   Diagnosis Date   • Anxiety    • Depression    • Generalized osteoarthritis    • H/O colonoscopy    • History of colon polyps     sigmoid colon   • History of diverticulosis    • History of esophageal reflux    • History of hiatal hernia    • History of irritable bowel syndrome    • History of nicotine dependence    • History of osteoarthritis    • Hypertension        Family History   Problem Relation Age of Onset   • Stroke Father    • Hypertension Father    • Diabetes Father    • Depression Other    • Osteoarthritis Other    • Cancer Other    • Lung cancer Other        Social History     Social History   • Marital status:      Spouse name: N/A   • Number of children: N/A   • Years of education: N/A     Occupational  History   • Not on file.     Social History Main Topics   • Smoking status: Former Smoker   • Smokeless tobacco: Not on file   • Alcohol use No   • Drug use: No   • Sexual activity: Not on file      Comment: High Risk sexual behavior/      Other Topics Concern   • Not on file     Social History Narrative       Review of Systems   Constitutional: Positive for fatigue. Negative for activity change and appetite change.   Respiratory: Positive for chest tightness. Negative for cough and shortness of breath.    Cardiovascular: Positive for palpitations and leg swelling. Negative for chest pain.   Neurological: Positive for dizziness and weakness. Negative for headaches.   All other systems reviewed and are negative.      ECG 12 Lead  Date/Time: 10/23/2017 1:20 PM  Performed by: MARTINEZ PORTER  Authorized by: MARTINEZ PORTER   Comparison: not compared with previous ECG   Rhythm: sinus rhythm  Rate: normal  Conduction: conduction normal  ST Segments: ST segments normal  T Waves: T waves normal  QRS axis: normal  Other: no other findings  Clinical impression: normal ECG  Comments: Showed to Dr. Richardson            /88  Pulse 80  Resp 18  Wt (!) 320 lb 12.8 oz (146 kg)  BMI 41.19 kg/m2    Physical Exam   Constitutional: He is oriented to person, place, and time. He appears well-developed and well-nourished.   HENT:   Head: Normocephalic and atraumatic.   Eyes: Conjunctivae are normal. Pupils are equal, round, and reactive to light.   Neck: Trachea normal and normal range of motion. Neck supple. No JVD present. No thyromegaly present.   Cardiovascular: Normal rate, regular rhythm, normal heart sounds and intact distal pulses.    No murmur heard.  2+ pitting edema in BLE   Pulmonary/Chest: Effort normal and breath sounds normal.   Neurological: He is alert and oriented to person, place, and time. He has normal strength.   Skin: Skin is warm, dry and intact.   Vitals reviewed.    Results for orders placed or  performed in visit on 10/23/17   POCT Glucose   Result Value Ref Range    Glucose 121 70 - 130 mg/dL   POC Glycosylated Hemoglobin (Hb A1C)   Result Value Ref Range    Hemoglobin A1C 5.7 %         After physical exam and ECG, patient began with complaints of feeling dizzy, O2 saturation on RA was 95% with HR= 178; patient reports feeling extremely weak and dizzy--no shortness of breath, chest pain, diaphoresis; after lying down for a few minutes, patient feels slightly better.  After consulting with Dr. Richardson, will send patient to  ED for further evaluation.  Assisted patient to vehicle;  ED triage notified of patient being sent for evaluation.    Assessment/Plan    1. Tachycardia with greater than 160 beats per minute  -sent to  ED for evaluation    2. Dizziness  - ED for evaluation    3. Impaired glucose tolerance  - POCT Glucose  - POC Glycosylated Hemoglobin (Hb A1C)      F/U after ED visit as needed; keep next scheduled appointment with Dr. Richardson.      Patient verbalizes understanding of medication dosage, comfort measures, instructions for treatment and follow-up.    Sushila Trujillo, APRN  10/23/2017

## 2017-10-23 NOTE — ED PROVIDER NOTES
Subjective   HPI Comments: Margarito Sosa is a 57 y.o.male with a hx of HTN who presents to the ED with c/o palpitations. For the past two days, the pt has had fast palpitations and has felt weak and dizzy. His palpitations have been intermittent but frequent since onset. After continued symptoms he presented to [facility] today. His HR was 178 bpm at their office, and while being evaluated he had a near syncopal episode. They recommended that he present to the ED for further cardiac workup. At the ED he denies CP or any other acute sx at this time.      Patient is a 57 y.o. male presenting with palpitations.   History provided by:  Patient  Palpitations   Palpitations quality:  Fast  Duration:  2 days  Timing:  Constant  Progression:  Worsening  Relieved by:  None tried  Worsened by:  Nothing  Ineffective treatments:  None tried  Associated symptoms: dizziness and weakness    Associated symptoms: no chest pain, no diaphoresis, no lower extremity edema, no nausea and no vomiting        Review of Systems   Constitutional: Negative for chills, diaphoresis and fever.   Cardiovascular: Positive for palpitations. Negative for chest pain.   Gastrointestinal: Negative for diarrhea, nausea and vomiting.   Neurological: Positive for dizziness and weakness.   All other systems reviewed and are negative.      Past Medical History:   Diagnosis Date   • Anxiety    • Depression    • Generalized osteoarthritis    • H/O colonoscopy    • History of colon polyps     sigmoid colon   • History of diverticulosis    • History of esophageal reflux    • History of hiatal hernia    • History of irritable bowel syndrome    • History of nicotine dependence    • History of osteoarthritis    • Hypertension        Allergies   Allergen Reactions   • Penicillins Hives       Past Surgical History:   Procedure Laterality Date   • FOOT SURGERY      club feet   • KNEE ACL RECONSTRUCTION     • KNEE ARTHROSCOPY      Right   •  LAMINECTOMY      Lumbar lower back   • SHOULDER SURGERY      Right       Family History   Problem Relation Age of Onset   • Stroke Father    • Hypertension Father    • Diabetes Father    • Depression Other    • Osteoarthritis Other    • Cancer Other    • Lung cancer Other        Social History     Social History   • Marital status:      Spouse name: N/A   • Number of children: N/A   • Years of education: N/A     Social History Main Topics   • Smoking status: Former Smoker   • Smokeless tobacco: None   • Alcohol use No   • Drug use: No   • Sexual activity: Not Asked      Comment: High Risk sexual behavior/      Other Topics Concern   • None     Social History Narrative         Objective   Physical Exam   Constitutional: He is oriented to person, place, and time. He appears well-developed and well-nourished. No distress.   HENT:   Head: Normocephalic and atraumatic.   Mouth/Throat: No oropharyngeal exudate.   Eyes: Conjunctivae are normal. No scleral icterus.   Neck: Normal range of motion. Neck supple. No JVD present.   Cardiovascular: Regular rhythm, normal heart sounds and intact distal pulses.  Tachycardia present.  Exam reveals no gallop and no friction rub.    No murmur heard.  Pulmonary/Chest: Effort normal and breath sounds normal. No respiratory distress. He has no wheezes. He has no rales.   Abdominal: Soft. Bowel sounds are normal. He exhibits no distension. There is no tenderness. There is no rebound and no guarding.   Musculoskeletal: Normal range of motion. He exhibits no edema.   Neurological: He is alert and oriented to person, place, and time.   Skin: Skin is warm and dry. He is not diaphoretic.   Psychiatric: He has a normal mood and affect. His behavior is normal.   Nursing note and vitals reviewed.      Chemical Cardioversion  Date/Time: 10/23/2017 3:55 PM  Performed by: ROBERT VICTOR  Authorized by: ROBERT VICTOR   Consent: The procedure was performed in an emergent  "situation. Verbal consent obtained.  Risks and benefits: risks, benefits and alternatives were discussed  Consent given by: patient  Patient understanding: patient states understanding of the procedure being performed  Patient consent: the patient's understanding of the procedure matches consent given  Patient identity confirmed: verbally with patient and arm band  Time out: Immediately prior to procedure a \"time out\" was called to verify the correct patient, procedure, equipment, support staff and site/side marked as required.  Cardioversion basis: emergent  Pre-procedure rhythm: supraventricular tachycardia  Patient position: patient was placed in a supine position  Number of attempts: 1  Attempt 1 outcome: conversion to normal sinus rhythm  Post-procedure rhythm: normal sinus rhythm  Complications: no complications  Patient tolerance: Patient tolerated the procedure well with no immediate complications  Comments: Patient was converted with 12 mg of adenosine.  No observed complications.               ED Course  ED Course   Value Comment By Time   Heart Rate: (!) 151 (Reviewed) Albino Hartman MD 10/23 1549    Patient in SVT on arrival.  Patient converted successfully with 12 mg of adenosine.  Patient reports feeling much better. Albino Hartman MD 10/23 4125    Dr. Hartman re-evaluated the pt and discussed all findings. Tyron Weissjulian 10/23 7787    The patient has 2 sets of negative cardiac enzymes.  We'll discharge him home to follow up with cardiology as soon as possible for further evaluation and management.  Patient advised to avoid any stimulants including caffeine, etc. Albino Hartman MD 10/23 4059     Recent Results (from the past 24 hour(s))   POCT Glucose    Collection Time: 10/23/17  2:40 PM   Result Value Ref Range    Glucose 121 70 - 130 mg/dL   POC Glycosylated Hemoglobin (Hb A1C)    Collection Time: 10/23/17  2:40 PM   Result Value Ref Range    Hemoglobin A1C 5.7 % "   Comprehensive Metabolic Panel    Collection Time: 10/23/17  3:40 PM   Result Value Ref Range    Glucose 106 (H) 70 - 100 mg/dL    BUN 17 9 - 23 mg/dL    Creatinine 0.90 0.60 - 1.30 mg/dL    Sodium 138 132 - 146 mmol/L    Potassium 3.8 3.5 - 5.5 mmol/L    Chloride 105 99 - 109 mmol/L    CO2 29.0 20.0 - 31.0 mmol/L    Calcium 8.9 8.7 - 10.4 mg/dL    Total Protein 6.8 5.7 - 8.2 g/dL    Albumin 4.10 3.20 - 4.80 g/dL    ALT (SGPT) 45 (H) 7 - 40 U/L    AST (SGOT) 27 0 - 33 U/L    Alkaline Phosphatase 71 25 - 100 U/L    Total Bilirubin 0.8 0.3 - 1.2 mg/dL    eGFR Non African Amer 88 >60 mL/min/1.73    Globulin 2.7 gm/dL    A/G Ratio 1.5 1.5 - 2.5 g/dL    BUN/Creatinine Ratio 18.9 7.0 - 25.0    Anion Gap 4.0 3.0 - 11.0 mmol/L   Magnesium    Collection Time: 10/23/17  3:40 PM   Result Value Ref Range    Magnesium 2.2 1.3 - 2.7 mg/dL   Light Blue Top    Collection Time: 10/23/17  3:40 PM   Result Value Ref Range    Extra Tube hold for add-on    Green Top (Gel)    Collection Time: 10/23/17  3:40 PM   Result Value Ref Range    Extra Tube Hold for add-ons.    Lavender Top    Collection Time: 10/23/17  3:40 PM   Result Value Ref Range    Extra Tube hold for add-on    Gold Top - SST    Collection Time: 10/23/17  3:40 PM   Result Value Ref Range    Extra Tube Hold for add-ons.    CBC Auto Differential    Collection Time: 10/23/17  3:40 PM   Result Value Ref Range    WBC 5.52 3.50 - 10.80 10*3/mm3    RBC 4.71 4.20 - 5.76 10*6/mm3    Hemoglobin 14.1 13.1 - 17.5 g/dL    Hematocrit 41.3 38.9 - 50.9 %    MCV 87.7 80.0 - 99.0 fL    MCH 29.9 27.0 - 31.0 pg    MCHC 34.1 32.0 - 36.0 g/dL    RDW 13.3 11.3 - 14.5 %    RDW-SD 42.7 37.0 - 54.0 fl    MPV 9.4 6.0 - 12.0 fL    Platelets 167 150 - 450 10*3/mm3    Neutrophil % 45.5 41.0 - 71.0 %    Lymphocyte % 39.5 24.0 - 44.0 %    Monocyte % 12.3 (H) 0.0 - 12.0 %    Eosinophil % 2.2 0.0 - 3.0 %    Basophil % 0.5 0.0 - 1.0 %    Immature Grans % 0.0 0.0 - 0.6 %    Neutrophils, Absolute 2.51 1.50 -  8.30 10*3/mm3    Lymphocytes, Absolute 2.18 0.60 - 4.80 10*3/mm3    Monocytes, Absolute 0.68 0.00 - 1.00 10*3/mm3    Eosinophils, Absolute 0.12 0.00 - 0.30 10*3/mm3    Basophils, Absolute 0.03 0.00 - 0.20 10*3/mm3    Immature Grans, Absolute 0.00 0.00 - 0.03 10*3/mm3   POC Troponin, Rapid    Collection Time: 10/23/17  3:42 PM   Result Value Ref Range    Troponin I 0.00 0.00 - 0.07 ng/mL   POC Troponin, Rapid    Collection Time: 10/23/17  5:43 PM   Result Value Ref Range    Troponin I 0.02 0.00 - 0.07 ng/mL     Note: In addition to lab results from this visit, the labs listed above may include labs taken at another facility or during a different encounter within the last 24 hours. Please correlate lab times with ED admission and discharge times for further clarification of the services performed during this visit.    XR Chest 1 View   Preliminary Result   No acute cardiopulmonary disease. The heart is enlarged.       D:  10/23/2017   E:  10/23/2017                Vitals:    10/23/17 1700 10/23/17 1702 10/23/17 1730 10/23/17 1838   BP: 112/79 112/79 116/75 126/92   BP Location:  Left arm  Left arm   Patient Position:  Lying  Lying   Pulse: 75 79 82 68   Resp:    20   Temp:    97.6 °F (36.4 °C)   TempSrc:    Oral   SpO2: 94% 93% 92% 98%   Weight:       Height:         Medications   adenosine (ADENOCARD) injection 12 mg (12 mg Intravenous Given 10/23/17 1547)     ECG/EMG Results (last 24 hours)     Procedure Component Value Units Date/Time    ECG 12 Lead [[id number]] Collected:  10/23/17 1534     Updated:  10/23/17 1535        EMR Dragon/Transcription disclaimer:   Much of this encounter note is an electronic transcription/translation of spoken language to printed text. The electronic translation of spoken language may permit erroneous, or at times, nonsensical words or phrases to be inadvertently transcribed; Although I have reviewed the note for such errors, some may still exist.               MDM  Number of Diagnoses  or Management Options  Diagnosis management comments: ECG/EMG Results (last 24 hours)     Procedure Component Value Units Date/Time    ECG 12 Lead ([id number]) Collected:  10/23/17 1534     Updated:  10/23/17 1727    Narrative:       Test Reason : Dysrhythmia triage protocol  Blood Pressure : **/** mmHG  Vent. Rate : 151 BPM     Atrial Rate : 020 BPM     P-R Int : 000 ms          QRS Dur : 090 ms      QT Int : 300 ms       P-R-T Axes : 000 011 005 degrees     QTc Int : 475 ms    Supraventricular tachycardia  Otherwise normal ECG  When compared with ECG of 03-APR-2013 13:52,  Vent. rate has increased BY  80 BPM  Confirmed by ROBERT VICTOR MD ([id number]) on 10/23/2017 5:27:25 PM    Referred By:  ALEX           Confirmed By:ROBERT VICTOR MD    ECG 12 Lead ([id number]) Collected:  10/23/17 1551     Updated:  10/23/17 1727    Narrative:       Test Reason : RHYTHM CHANGE  Blood Pressure : **/** mmHG  Vent. Rate : 080 BPM     Atrial Rate : 080 BPM     P-R Int : 186 ms          QRS Dur : 092 ms      QT Int : 372 ms       P-R-T Axes : 031 008 014 degrees     QTc Int : 429 ms    Normal sinus rhythm  When compared with ECG of 23-OCT-2017 15:34, (Unconfirmed)  Vent. rate has decreased BY  71 BPM  Confirmed by ROBERT VICTOR MD ([id number]) on 10/23/2017 5:27:53 PM    Referred By:  KAYLEIGH           Confirmed By:ROBERT VICTOR MD    ECG 12 Lead ([id number]) Collected:  10/23/17 1743     Updated:  10/23/17 1826    Narrative:       Test Reason : 2nd set  Blood Pressure : **/** mmHG  Vent. Rate : 073 BPM     Atrial Rate : 073 BPM     P-R Int : 172 ms          QRS Dur : 082 ms      QT Int : 396 ms       P-R-T Axes : 033 027 033 degrees     QTc Int : 436 ms    Normal sinus rhythm  When compared with ECG of 23-OCT-2017 15:51,  No significant change was found  Confirmed by ROBERT VICTOR MD ([id number]) on 10/23/2017 6:26:49 PM    Referred By:  KAYLEIGH           Confirmed By:ROBERT VICTOR MD             Amount and/or Complexity of Data Reviewed  Clinical lab  tests: reviewed  Tests in the radiology section of CPT®: reviewed  Independent visualization of images, tracings, or specimens: yes        Final diagnoses:   SVT (supraventricular tachycardia)   Essential hypertension   Impaired glucose tolerance   Obesity due to excess calories without serious comorbidity, unspecified classification   Dyslipidemia       Documentation assistance provided by isidoro Douglas.  Information recorded by the scribe was done at my direction and has been verified and validated by me.     Tyron Douglas  10/23/17 4481       Albino Hartman MD  10/23/17 9222

## 2017-10-27 ENCOUNTER — OFFICE VISIT (OUTPATIENT)
Dept: CARDIOLOGY | Facility: HOSPITAL | Age: 57
End: 2017-10-27

## 2017-10-27 VITALS
RESPIRATION RATE: 22 BRPM | OXYGEN SATURATION: 93 % | BODY MASS INDEX: 40.43 KG/M2 | DIASTOLIC BLOOD PRESSURE: 100 MMHG | HEIGHT: 74 IN | WEIGHT: 315 LBS | TEMPERATURE: 97.7 F | SYSTOLIC BLOOD PRESSURE: 143 MMHG | HEART RATE: 81 BPM

## 2017-10-27 DIAGNOSIS — I47.1 PAROXYSMAL SVT (SUPRAVENTRICULAR TACHYCARDIA) (HCC): Primary | ICD-10-CM

## 2017-10-27 DIAGNOSIS — I10 ESSENTIAL HYPERTENSION: ICD-10-CM

## 2017-10-27 PROCEDURE — 99213 OFFICE O/P EST LOW 20 MIN: CPT | Performed by: NURSE PRACTITIONER

## 2017-10-27 RX ORDER — FEXOFENADINE HYDROCHLORIDE 60 MG/1
180 TABLET, FILM COATED ORAL DAILY
COMMUNITY

## 2017-10-27 NOTE — PROGRESS NOTES
[facility]  [facility]  [facility]    Encounter Date:10/27/2017     Margarito Sosa  [address] [facility] [address]  [phone]    1960    Francesca Richardson DO    Margarito Sosa is a 57 y.o. male.      Subjective:     Chief Complaint:  Establish Care (s/p Phamacological Cardioversion at the ED for SVT on 10/23/17)       HPI   Mr. Sosa presents to the [facility] at the request of the ED for SVT. He presented to his primary care provider feeling poorly and HR was in the 170s and he was sent to ED. EKG showed SVT which was converted successfully with adenosine. He says he has had symptoms for a couple of years but an arrhythmia has never been found. Since the ED he had one episode that felt like he was going to go into SVT but didn't. He does take occasional decongestants. He does report labile HTN    Palpitations   Palpitations quality:  Fast  Duration:  2 days  Timing:  Constant  Progression:  Worsening  Relieved by:  None tried  Worsened by:  Nothing  Ineffective treatments:  None tried  Associated symptoms: dizziness and weakness    Associated symptoms: no chest pain, no diaphoresis, no lower extremity edema, no nausea and no vomiting      Cardiac risk factors:  hypertension  Former tobacco use, sedentary lifestyle, obesity (BMI > 30), gender, age (>50)      Allergies   Allergen Reactions   • Penicillins Hives         Current Outpatient Prescriptions:   •  ALPRAZolam (XANAX) 1 MG tablet, Take 1 tablet by mouth Daily., Disp: 30 tablet, Rfl: 2  •  amLODIPine (NORVASC) 5 MG tablet, Take 1 tablet by mouth Daily., Disp: 30 tablet, Rfl: 5  •  azelastine (ASTELIN) 0.1 % nasal spray, USE 2 SPRAY(S) TWICE A DAY BY INTRANASAL ROUTE FOR 30 DAYS., Disp: , Rfl: 11  •  cyclobenzaprine (FLEXERIL) 5 MG tablet, Take 1 tablet by mouth 3 (Three) Times a Day As Needed for Muscle Spasms., Disp: 90 tablet, Rfl: 0  •  fexofenadine (ALLEGRA) 60 MG tablet, Take 180 mg by mouth  Daily., Disp: , Rfl:   •  losartan (COZAAR) 100 MG tablet, TAKE 1 TABLET BY MOUTH EVERY DAY, Disp: 90 tablet, Rfl: 1  •  meloxicam (MOBIC) 7.5 MG tablet, TAKE 1 TABLET BY MOUTH EVERY DAY AS NEEDED, Disp: 90 tablet, Rfl: 1  •  nystatin (MYCOSTATIN) [id number] UNIT/ML suspension, , Disp: , Rfl:   •  omeprazole (priLOSEC) 20 MG capsule, TAKE ONE CAPSULE BY MOUTH EVERY DAY 30-60 MINUTES PRIOR TO A MEAL, Disp: , Rfl: 3  •  PULMICORT FLEXHALER 180 MCG/ACT inhaler, IHNALE 1 PUFF TWICE A DAY AS NEEDED, Disp: , Rfl: 5  •  ranitidine (ZANTAC) 150 MG tablet, TAKE 1 TABLET BY MOUTH AT NIGHT, Disp: , Rfl: 3  •  tamsulosin (FLOMAX) 0.4 MG capsule 24 hr capsule, Take  by mouth., Disp: , Rfl:   •  famciclovir (FAMVIR) 500 MG tablet, TAKE 1 TABLET BY MOUTH 3 TIMES A DAY, Disp: 21 tablet, Rfl: 0  •  fluticasone (FLONASE) 50 MCG/ACT nasal spray, USE 2 SPRAY(S) TWICE A DAY BY INTRANASAL ROUTE FOR 30 DAYS., Disp: , Rfl: 11  •  HYDROcodone-acetaminophen (NORCO) 7.5-325 MG per tablet, Take  by mouth., Disp: , Rfl:   •  ipratropium (ATROVENT) 0.06 % nasal spray, , Disp: , Rfl:   •  VENTOLIN  (90 BASE) MCG/ACT inhaler, INHALE 2 PUFFS 4 TIMES A DAY, Disp: , Rfl: 0    The following portions of the patient's history were reviewed and updated as appropriate in Epic:  Problem list, allergies, current medications, past medical and surgical history, past social and family history.     Review of Systems   Constitution: Positive for weakness and malaise/fatigue. Negative for chills, decreased appetite, diaphoresis, fever, night sweats, weight gain and weight loss.   HENT: Negative for congestion, hearing loss, hoarse voice and nosebleeds.    Eyes: Negative for blurred vision, visual disturbance and visual halos.   Cardiovascular: Positive for chest pain (chest tightness), irregular heartbeat, leg swelling, near-syncope and palpitations. Negative for claudication, cyanosis, dyspnea on exertion, orthopnea, paroxysmal nocturnal dyspnea and syncope.  "  Respiratory: Positive for shortness of breath, sleep disturbances due to breathing and snoring. Negative for cough, hemoptysis, sputum production and wheezing.    Hematologic/Lymphatic: Negative for bleeding problem. Does not bruise/bleed easily.   Skin: Negative for dry skin, itching and rash.   Musculoskeletal: Positive for muscle cramps. Negative for arthritis, joint pain, joint swelling and myalgias.   Gastrointestinal: Positive for constipation and heartburn. Negative for bloating, abdominal pain, diarrhea, flatus, hematemesis, hematochezia, melena, nausea and vomiting.   Genitourinary: Positive for nocturia. Negative for dysuria, frequency, hematuria and urgency.   Neurological: Positive for excessive daytime sleepiness. Negative for dizziness, headaches, light-headedness and loss of balance.   Psychiatric/Behavioral: Negative for depression. The patient has insomnia. The patient is not nervous/anxious.    Allergic/Immunologic: Positive for environmental allergies.        Seasonal allergies       Objective:     Vitals:    10/27/17 0958 10/27/17 1001 10/27/17 1004   BP: 139/99 136/99 143/100   BP Location: Right arm Left arm Left arm   Patient Position: Sitting Sitting Standing   Cuff Size: Large Adult     Pulse: 73 75 81   Resp: 22     Temp: 97.7 °F (36.5 °C)     TempSrc: Temporal Artery      SpO2: 93%     Weight: (!) 318 lb (144 kg)     Height: 74\" (188 cm)           Physical Exam   Constitutional: He is oriented to person, place, and time. He appears well-developed and well-nourished. He is active and cooperative. No distress.   HENT:   Head: Normocephalic and atraumatic.   Mouth/Throat: Oropharynx is clear and moist.   Eyes: Conjunctivae and EOM are normal. Pupils are equal, round, and reactive to light.   Neck: Normal range of motion. Neck supple. No JVD present. No tracheal deviation present. No thyromegaly present.   Cardiovascular: Normal rate, regular rhythm, normal heart sounds and intact distal " pulses.    Pulmonary/Chest: Effort normal and breath sounds normal.   Abdominal: Soft. Bowel sounds are normal. He exhibits no distension. There is no tenderness.   Musculoskeletal: Normal range of motion.   Neurological: He is alert and oriented to person, place, and time.   Skin: Skin is warm, dry and intact.   Psychiatric: He has a normal mood and affect. His behavior is normal.   Nursing note and vitals reviewed.      Lab and Diagnostic Review:  Results for orders placed or performed during the hospital encounter of 10/23/17   Comprehensive Metabolic Panel   Result Value Ref Range    Glucose 106 (H) 70 - 100 mg/dL    BUN 17 9 - 23 mg/dL    Creatinine 0.90 0.60 - 1.30 mg/dL    Sodium 138 132 - 146 mmol/L    Potassium 3.8 3.5 - 5.5 mmol/L    Chloride 105 99 - 109 mmol/L    CO2 29.0 20.0 - 31.0 mmol/L    Calcium 8.9 8.7 - 10.4 mg/dL    Total Protein 6.8 5.7 - 8.2 g/dL    Albumin 4.10 3.20 - 4.80 g/dL    ALT (SGPT) 45 (H) 7 - 40 U/L    AST (SGOT) 27 0 - 33 U/L    Alkaline Phosphatase 71 25 - 100 U/L    Total Bilirubin 0.8 0.3 - 1.2 mg/dL    eGFR Non African Amer 88 >60 mL/min/1.73    Globulin 2.7 gm/dL    A/G Ratio 1.5 1.5 - 2.5 g/dL    BUN/Creatinine Ratio 18.9 7.0 - 25.0    Anion Gap 4.0 3.0 - 11.0 mmol/L   Magnesium   Result Value Ref Range    Magnesium 2.2 1.3 - 2.7 mg/dL   CBC Auto Differential   Result Value Ref Range    WBC 5.52 3.50 - 10.80 10*3/mm3    RBC 4.71 4.20 - 5.76 10*6/mm3    Hemoglobin 14.1 13.1 - 17.5 g/dL    Hematocrit 41.3 38.9 - 50.9 %    MCV 87.7 80.0 - 99.0 fL    MCH 29.9 27.0 - 31.0 pg    MCHC 34.1 32.0 - 36.0 g/dL    RDW 13.3 11.3 - 14.5 %    RDW-SD 42.7 37.0 - 54.0 fl    MPV 9.4 6.0 - 12.0 fL    Platelets 167 150 - 450 10*3/mm3    Neutrophil % 45.5 41.0 - 71.0 %    Lymphocyte % 39.5 24.0 - 44.0 %    Monocyte % 12.3 (H) 0.0 - 12.0 %    Eosinophil % 2.2 0.0 - 3.0 %    Basophil % 0.5 0.0 - 1.0 %    Immature Grans % 0.0 0.0 - 0.6 %    Neutrophils, Absolute 2.51 1.50 - 8.30 10*3/mm3     Lymphocytes, Absolute 2.18 0.60 - 4.80 10*3/mm3    Monocytes, Absolute 0.68 0.00 - 1.00 10*3/mm3    Eosinophils, Absolute 0.12 0.00 - 0.30 10*3/mm3    Basophils, Absolute 0.03 0.00 - 0.20 10*3/mm3    Immature Grans, Absolute 0.00 0.00 - 0.03 10*3/mm3   POC Troponin, Rapid     EKG in ED originally showed SVT with a rate of 150, repeat EKGs NSR  Assessment and Plan:     1. Paroxysmal SVT (supraventricular tachycardia)  - Metoprolol PRN   - Avoid decongestants and excessive caffeine   - Discussed vagal maneuvers such as bearing down and coughing    2. Essential hypertension  - Controlled at recent office visits  - HTN Education provided today including signs and symptoms, medication management, daily blood pressure monitoring. Patient encouraged to call the Heart and Valve center with any abnormal readings.     FU in 2 weeks with BP readings    *Please note that portions of this note were completed with a voice recognition program. Efforts were made to edit the dictations, but occasionally words are mistranscribed.

## 2017-11-01 RX ORDER — LOSARTAN POTASSIUM 100 MG/1
TABLET ORAL
Qty: 90 TABLET | Refills: 1 | Status: SHIPPED | OUTPATIENT
Start: 2017-11-01 | End: 2017-12-15

## 2017-11-09 ENCOUNTER — OFFICE VISIT (OUTPATIENT)
Dept: CARDIOLOGY | Facility: HOSPITAL | Age: 57
End: 2017-11-09

## 2017-11-09 VITALS
HEART RATE: 91 BPM | SYSTOLIC BLOOD PRESSURE: 139 MMHG | BODY MASS INDEX: 40.43 KG/M2 | RESPIRATION RATE: 21 BRPM | WEIGHT: 315 LBS | HEIGHT: 74 IN | DIASTOLIC BLOOD PRESSURE: 82 MMHG | OXYGEN SATURATION: 93 % | TEMPERATURE: 97.6 F

## 2017-11-09 DIAGNOSIS — E66.01 MORBID OBESITY (HCC): ICD-10-CM

## 2017-11-09 DIAGNOSIS — I10 ESSENTIAL HYPERTENSION: ICD-10-CM

## 2017-11-09 DIAGNOSIS — R06.02 SHORTNESS OF BREATH: ICD-10-CM

## 2017-11-09 DIAGNOSIS — I47.1 SVT (SUPRAVENTRICULAR TACHYCARDIA) (HCC): Primary | ICD-10-CM

## 2017-11-09 PROBLEM — I47.10 SVT (SUPRAVENTRICULAR TACHYCARDIA): Status: ACTIVE | Noted: 2017-11-09

## 2017-11-09 PROCEDURE — 99214 OFFICE O/P EST MOD 30 MIN: CPT | Performed by: NURSE PRACTITIONER

## 2017-11-09 NOTE — PROGRESS NOTES
Encounter Date:11/09/2017      Patient ID: Margarito Sosa is a 57 y.o. male.        Subjective:     Chief Complaint: Follow-up HTN, SVT     History of Present Illness   Mr. Sosa presents to the [facility] to follow up on HTN and paroxysmal SVT. He brings in his BP readings today and blood pressures are mostly 140-150/80-90, HR's in the 70-80s. He has not had to take the metoprolol but yesterday he did have some feeling like he may go into SVT but he didn't. He denies chest pain but feels dyspnea on exertion is worse. He is concerned about a blockage in his heart. He has never had a cardiac work up    Patient Active Problem List   Diagnosis   • Gastroesophageal reflux disease   • Anxiety   • Dyslipidemia   • Ear discharge   • Essential hypertension   • Impaired glucose tolerance   • Acute bronchitis   • Cutaneous candidiasis   • Depression   • Dizziness   • Elevated LFTs   • Excessive sweating   • Heart palpitations   • Hemoptysis   • Genital HSV   • High risk sexual behavior   • Joint pain, knee   • Muscle cramps   • Onychomycosis of toenail   • Otitis media of right ear   • Pain, wrist joint   • Pharyngitis, acute   • Seborrheic dermatitis of scalp   • Tinea pedis   • SVT (supraventricular tachycardia)         Past Surgical History:   Procedure Laterality Date   • CARDIOVERSION      with asdenoside for SVT   • FOOT SURGERY      club feet   • HERNIA REPAIR     • KNEE ACL RECONSTRUCTION     • KNEE ARTHROSCOPY      Right   • LAMINECTOMY      Lumbar lower back   • SHOULDER SURGERY      Right       Allergies   Allergen Reactions   • Penicillins Hives         Current Outpatient Prescriptions:   •  ALPRAZolam (XANAX) 1 MG tablet, Take 1 tablet by mouth Daily., Disp: 30 tablet, Rfl: 2  •  amLODIPine (NORVASC) 5 MG tablet, Take 1 tablet by mouth Daily., Disp: 30 tablet, Rfl: 5  •  azelastine (ASTELIN) 0.1 % nasal spray, 2 sprays into each nostril 2 (Two) Times a Day., Disp: , Rfl: 11  •  cyclobenzaprine  (FLEXERIL) 5 MG tablet, Take 1 tablet by mouth 3 (Three) Times a Day As Needed for Muscle Spasms., Disp: 90 tablet, Rfl: 0  •  fexofenadine (ALLEGRA) 60 MG tablet, Take 180 mg by mouth Daily., Disp: , Rfl:   •  fluticasone (FLONASE) 50 MCG/ACT nasal spray, 2 sprays into each nostril Daily As Needed., Disp: , Rfl: 11  •  HYDROcodone-acetaminophen (NORCO) 7.5-325 MG per tablet, Take 1 tablet by mouth Every 6 (Six) Hours As Needed., Disp: , Rfl:   •  ipratropium (ATROVENT) 0.06 % nasal spray, 1 spray into each nostril Daily As Needed., Disp: , Rfl:   •  losartan (COZAAR) 100 MG tablet, TAKE 1 TABLET BY MOUTH EVERY DAY, Disp: 90 tablet, Rfl: 1  •  meloxicam (MOBIC) 7.5 MG tablet, TAKE 1 TABLET BY MOUTH EVERY DAY AS NEEDED, Disp: 90 tablet, Rfl: 1  •  metoprolol tartrate (LOPRESSOR) 25 MG tablet, Take 1 tablet PRN twice a day for fast heart rate, Disp: 60 tablet, Rfl: 2  •  nystatin (MYCOSTATIN) [id number] UNIT/ML suspension, Take  by mouth Daily As Needed., Disp: , Rfl:   •  omeprazole (priLOSEC) 20 MG capsule, Take 20 mg by mouth Daily., Disp: , Rfl: 3  •  PULMICORT FLEXHALER 180 MCG/ACT inhaler, Inhale 1 puff 2 (Two) Times a Day As Needed., Disp: , Rfl: 5  •  ranitidine (ZANTAC) 150 MG tablet, Take 150 mg by mouth Every Night., Disp: , Rfl: 3  •  tamsulosin (FLOMAX) 0.4 MG capsule 24 hr capsule, Take 0.4 mg by mouth Daily., Disp: , Rfl:   •  VENTOLIN  (90 BASE) MCG/ACT inhaler, Inhale 2 puffs 4 (Four) Times a Day Before Meals & at Bedtime As Needed., Disp: , Rfl: 0    The following portions of the chart were reviewed and updated as appropriate: Allergies, current medications, past family history, social history, past medical history.     Review of Systems   Constitution: Positive for weakness, malaise/fatigue and weight gain. Negative for chills, decreased appetite, diaphoresis, fever, night sweats and weight loss.   HENT: Negative for congestion, hearing loss, hoarse voice and nosebleeds.    Eyes: Positive for  "blurred vision. Negative for visual disturbance and visual halos.   Cardiovascular: Positive for claudication, dyspnea on exertion, leg swelling and palpitations. Negative for chest pain, cyanosis, irregular heartbeat, near-syncope, orthopnea, paroxysmal nocturnal dyspnea and syncope.   Respiratory: Positive for sleep disturbances due to breathing, snoring and wheezing. Negative for cough, hemoptysis, shortness of breath and sputum production.    Endocrine: Positive for heat intolerance.   Hematologic/Lymphatic: Negative for bleeding problem. Bruises/bleeds easily.   Skin: Positive for itching. Negative for dry skin and rash.   Musculoskeletal: Positive for joint pain and muscle cramps. Negative for arthritis, joint swelling and myalgias.   Gastrointestinal: Positive for constipation and heartburn. Negative for bloating, abdominal pain, diarrhea, flatus, hematemesis, hematochezia, melena, nausea and vomiting.   Genitourinary: Negative for dysuria, frequency, hematuria, nocturia and urgency.   Neurological: Positive for excessive daytime sleepiness and loss of balance. Negative for dizziness, headaches and light-headedness.   Psychiatric/Behavioral: Positive for altered mental status (confusion), depression and memory loss. The patient is nervous/anxious.            Objective:     Vitals:    11/09/17 1429 11/09/17 1430 11/09/17 1431   BP: 151/93 142/84 139/82   BP Location: Right arm Left arm Left arm   Patient Position: Sitting Sitting Standing   Cuff Size: Adult Adult    Pulse: 87 82 91   Resp: 21     Temp: 97.6 °F (36.4 °C)     TempSrc: Temporal Artery      SpO2: 93%     Weight: (!) 325 lb (147 kg)     Height: 74\" (188 cm)           Physical Exam   Constitutional: He is oriented to person, place, and time. He appears well-developed and well-nourished. He is active and cooperative. No distress.   Morbidly obese   HENT:   Head: Normocephalic and atraumatic.   Mouth/Throat: Oropharynx is clear and moist.   Eyes: " Conjunctivae and EOM are normal. Pupils are equal, round, and reactive to light.   Neck: Normal range of motion. Neck supple. No JVD present. No tracheal deviation present. No thyromegaly present.   Cardiovascular: Normal rate, regular rhythm, normal heart sounds and intact distal pulses.    Pulmonary/Chest: Effort normal and breath sounds normal.   Abdominal: Soft. Bowel sounds are normal. He exhibits no distension. There is no tenderness.   Musculoskeletal: Normal range of motion.   Neurological: He is alert and oriented to person, place, and time.   Skin: Skin is warm, dry and intact.   Psychiatric: He has a normal mood and affect. His behavior is normal.   Nursing note and vitals reviewed.      Lab and Diagnostic Review:      Results for orders placed or performed during the hospital encounter of 10/23/17   Comprehensive Metabolic Panel   Result Value Ref Range    Glucose 106 (H) 70 - 100 mg/dL    BUN 17 9 - 23 mg/dL    Creatinine 0.90 0.60 - 1.30 mg/dL    Sodium 138 132 - 146 mmol/L    Potassium 3.8 3.5 - 5.5 mmol/L    Chloride 105 99 - 109 mmol/L    CO2 29.0 20.0 - 31.0 mmol/L    Calcium 8.9 8.7 - 10.4 mg/dL    Total Protein 6.8 5.7 - 8.2 g/dL    Albumin 4.10 3.20 - 4.80 g/dL    ALT (SGPT) 45 (H) 7 - 40 U/L    AST (SGOT) 27 0 - 33 U/L    Alkaline Phosphatase 71 25 - 100 U/L    Total Bilirubin 0.8 0.3 - 1.2 mg/dL    eGFR Non African Amer 88 >60 mL/min/1.73    Globulin 2.7 gm/dL    A/G Ratio 1.5 1.5 - 2.5 g/dL    BUN/Creatinine Ratio 18.9 7.0 - 25.0    Anion Gap 4.0 3.0 - 11.0 mmol/L   Magnesium   Result Value Ref Range    Magnesium 2.2 1.3 - 2.7 mg/dL   CBC Auto Differential   Result Value Ref Range    WBC 5.52 3.50 - 10.80 10*3/mm3    RBC 4.71 4.20 - 5.76 10*6/mm3    Hemoglobin 14.1 13.1 - 17.5 g/dL    Hematocrit 41.3 38.9 - 50.9 %    MCV 87.7 80.0 - 99.0 fL    MCH 29.9 27.0 - 31.0 pg    MCHC 34.1 32.0 - 36.0 g/dL    RDW 13.3 11.3 - 14.5 %    RDW-SD 42.7 37.0 - 54.0 fl    MPV 9.4 6.0 - 12.0 fL    Platelets 167  150 - 450 10*3/mm3    Neutrophil % 45.5 41.0 - 71.0 %    Lymphocyte % 39.5 24.0 - 44.0 %    Monocyte % 12.3 (H) 0.0 - 12.0 %    Eosinophil % 2.2 0.0 - 3.0 %    Basophil % 0.5 0.0 - 1.0 %    Immature Grans % 0.0 0.0 - 0.6 %    Neutrophils, Absolute 2.51 1.50 - 8.30 10*3/mm3    Lymphocytes, Absolute 2.18 0.60 - 4.80 10*3/mm3    Monocytes, Absolute 0.68 0.00 - 1.00 10*3/mm3    Eosinophils, Absolute 0.12 0.00 - 0.30 10*3/mm3    Basophils, Absolute 0.03 0.00 - 0.20 10*3/mm3    Immature Grans, Absolute 0.00 0.00 - 0.03 10*3/mm3   POC Troponin, Rapid   Result Value Ref Range    Troponin I 0.00 0.00 - 0.07 ng/mL   POC Troponin, Rapid   Result Value Ref Range    Troponin I 0.02 0.00 - 0.07 ng/mL           Assessment and Plan:         1. SVT (supraventricular tachycardia)  - Take metoprolol twice daily  - Discussed SVT ablation and he would like to try lifestyle modifications and medication    2. Essential hypertension  - Uncontrolled  - Start metoprolol tartrate 25mg BID for BP control and SVT    3. Dyspnea on exertion  -Questionable anginal equivalent with multiple risk factors. Will do stress PET and echo    It has been a pleasure to participate in the care of this patient.  Patient was instructed to call the [facility] with any questions, concerns, or worsening symptoms.    * Please note that portions of this note were completed with a voice recognition program. Efforts were made to edit the dictation but occasionally words are transcribed.

## 2017-12-04 ENCOUNTER — HOSPITAL ENCOUNTER (OUTPATIENT)
Dept: CARDIOLOGY | Facility: HOSPITAL | Age: 57
Discharge: HOME | End: 2017-12-04

## 2017-12-04 ENCOUNTER — OFFICE VISIT (OUTPATIENT)
Dept: CARDIOLOGY | Facility: HOSPITAL | Age: 57
End: 2017-12-04

## 2017-12-04 ENCOUNTER — HOSPITAL ENCOUNTER (OUTPATIENT)
Dept: CARDIOLOGY | Facility: HOSPITAL | Age: 57
Discharge: HOME | End: 2017-12-04
Admitting: NURSE PRACTITIONER

## 2017-12-04 VITALS
SYSTOLIC BLOOD PRESSURE: 139 MMHG | HEIGHT: 74 IN | TEMPERATURE: 97.7 F | WEIGHT: 315 LBS | RESPIRATION RATE: 20 BRPM | DIASTOLIC BLOOD PRESSURE: 84 MMHG | OXYGEN SATURATION: 94 % | HEART RATE: 75 BPM | BODY MASS INDEX: 40.43 KG/M2

## 2017-12-04 DIAGNOSIS — I10 ESSENTIAL HYPERTENSION: ICD-10-CM

## 2017-12-04 DIAGNOSIS — I51.89 DIASTOLIC DYSFUNCTION: ICD-10-CM

## 2017-12-04 DIAGNOSIS — I47.1 PAROXYSMAL SVT (SUPRAVENTRICULAR TACHYCARDIA) (HCC): Primary | ICD-10-CM

## 2017-12-04 DIAGNOSIS — R06.02 SHORTNESS OF BREATH: ICD-10-CM

## 2017-12-04 LAB
ASCENDING AORTA: 3.72 CM
BH CV ECHO MEAS - AO ROOT AREA (BSA CORRECTED): 1.6
BH CV ECHO MEAS - AO ROOT AREA: 13.9 CM^2
BH CV ECHO MEAS - AO ROOT DIAM: 4.2 CM
BH CV ECHO MEAS - BSA(HAYCOCK): 2.8 M^2
BH CV ECHO MEAS - BSA: 2.7 M^2
BH CV ECHO MEAS - BZI_BMI: 41.1 KILOGRAMS/M^2
BH CV ECHO MEAS - BZI_METRIC_HEIGHT: 188 CM
BH CV ECHO MEAS - BZI_METRIC_WEIGHT: 145.2 KG
BH CV ECHO MEAS - CONTRAST EF (2CH): 52.4 ML/M^2
BH CV ECHO MEAS - CONTRAST EF 4CH: 60.7 ML/M^2
BH CV ECHO MEAS - EDV(CUBED): 281 ML
BH CV ECHO MEAS - EDV(MOD-SP2): 105 ML
BH CV ECHO MEAS - EDV(MOD-SP4): 140 ML
BH CV ECHO MEAS - EDV(TEICH): 219.8 ML
BH CV ECHO MEAS - EF(CUBED): 52 %
BH CV ECHO MEAS - EF(MOD-SP2): 52.4 %
BH CV ECHO MEAS - EF(MOD-SP4): 60.7 %
BH CV ECHO MEAS - EF(TEICH): 42.9 %
BH CV ECHO MEAS - ESV(CUBED): 135 ML
BH CV ECHO MEAS - ESV(MOD-SP2): 50 ML
BH CV ECHO MEAS - ESV(MOD-SP4): 55 ML
BH CV ECHO MEAS - ESV(TEICH): 125.5 ML
BH CV ECHO MEAS - FS: 21.7 %
BH CV ECHO MEAS - IVS/LVPW: 1
BH CV ECHO MEAS - IVSD: 1 CM
BH CV ECHO MEAS - LA DIMENSION: 4.3 CM
BH CV ECHO MEAS - LA/AO: 1
BH CV ECHO MEAS - LAT PEAK E' VEL: 9.3 CM/SEC
BH CV ECHO MEAS - LV DIASTOLIC VOL/BSA (35-75): 52.8 ML/M^2
BH CV ECHO MEAS - LV MASS(C)D: 273.4 GRAMS
BH CV ECHO MEAS - LV MASS(C)DI: 103 GRAMS/M^2
BH CV ECHO MEAS - LV SYSTOLIC VOL/BSA (12-30): 20.7 ML/M^2
BH CV ECHO MEAS - LVIDD: 6.6 CM
BH CV ECHO MEAS - LVIDS: 5.1 CM
BH CV ECHO MEAS - LVLD AP2: 8.7 CM
BH CV ECHO MEAS - LVLD AP4: 8.7 CM
BH CV ECHO MEAS - LVLS AP2: 7.4 CM
BH CV ECHO MEAS - LVLS AP4: 8 CM
BH CV ECHO MEAS - LVPWD: 1 CM
BH CV ECHO MEAS - MED PEAK E' VEL: 7.7 CM/SEC
BH CV ECHO MEAS - MV A MAX VEL: 75.5 CM/SEC
BH CV ECHO MEAS - MV E MAX VEL: 73.1 CM/SEC
BH CV ECHO MEAS - MV E/A: 0.97
BH CV ECHO MEAS - PA ACC SLOPE: 469 CM/SEC^2
BH CV ECHO MEAS - PA ACC TIME: 0.16 SEC
BH CV ECHO MEAS - PA PR(ACCEL): 6.1 MMHG
BH CV ECHO MEAS - SI(CUBED): 55 ML/M^2
BH CV ECHO MEAS - SI(MOD-SP2): 20.7 ML/M^2
BH CV ECHO MEAS - SI(MOD-SP4): 32 ML/M^2
BH CV ECHO MEAS - SI(TEICH): 35.5 ML/M^2
BH CV ECHO MEAS - SV(CUBED): 146 ML
BH CV ECHO MEAS - SV(MOD-SP2): 55 ML
BH CV ECHO MEAS - SV(MOD-SP4): 85 ML
BH CV ECHO MEAS - SV(TEICH): 94.3 ML
BH CV ECHO MEAS - TAPSE (>1.6): 1.9 CM2
BH CV STRESS BP STAGE 1: NORMAL
BH CV STRESS BP STAGE 3: NORMAL
BH CV STRESS COMMENTS STAGE 1: NORMAL
BH CV STRESS DOSE REGADENOSON STAGE 1: 0.4
BH CV STRESS DURATION MIN STAGE 1: 1
BH CV STRESS DURATION MIN STAGE 2: 1
BH CV STRESS DURATION MIN STAGE 3: 1
BH CV STRESS DURATION MIN STAGE 4: 1
BH CV STRESS DURATION SEC STAGE 1: 15
BH CV STRESS DURATION SEC STAGE 2: 0
BH CV STRESS DURATION SEC STAGE 3: 0
BH CV STRESS DURATION SEC STAGE 4: 0
BH CV STRESS HR STAGE 1: 69
BH CV STRESS HR STAGE 2: 83
BH CV STRESS HR STAGE 3: 80
BH CV STRESS HR STAGE 4: 78
BH CV STRESS PROTOCOL 1: NORMAL
BH CV STRESS RECOVERY BP: NORMAL MMHG
BH CV STRESS RECOVERY HR: 70 BPM
BH CV STRESS RECOVERY O2: 96 %
BH CV STRESS STAGE 1: 1
BH CV STRESS STAGE 2: 2
BH CV STRESS STAGE 3: 3
BH CV STRESS STAGE 4: 4
BH CV VAS BP RIGHT ARM: NORMAL MMHG
BH CV XLRA - RV BASE: 4.8 CM
BH CV XLRA - RV LENGTH: 8.8 CM
BH CV XLRA - RV MID: 3.8 CM
BH CV XLRA - TDI S': 11.6 CM/SEC
LV EF 2D ECHO EST: 60 %
LV EF NUC BP: 64 %
MAXIMAL PREDICTED HEART RATE: 165 BPM
MAXIMAL PREDICTED HEART RATE: 165 BPM
PERCENT MAX PREDICTED HR: 50.91 %
STJ: 3.92 CM
STRESS BASELINE BP: NORMAL MMHG
STRESS BASELINE HR: 67 BPM
STRESS O2 SAT REST: 98 %
STRESS PERCENT HR: 60 %
STRESS POST O2 SAT PEAK: 98 %
STRESS POST PEAK BP: NORMAL MMHG
STRESS POST PEAK HR: 84 BPM
STRESS TARGET HR: 140 BPM
STRESS TARGET HR: 140 BPM

## 2017-12-04 PROCEDURE — 93018 CV STRESS TEST I&R ONLY: CPT | Performed by: INTERNAL MEDICINE

## 2017-12-04 PROCEDURE — 93017 CV STRESS TEST TRACING ONLY: CPT

## 2017-12-04 PROCEDURE — 93005 ELECTROCARDIOGRAM TRACING: CPT | Performed by: NURSE PRACTITIONER

## 2017-12-04 PROCEDURE — 93010 ELECTROCARDIOGRAM REPORT: CPT | Performed by: INTERNAL MEDICINE

## 2017-12-04 PROCEDURE — 93306 TTE W/DOPPLER COMPLETE: CPT

## 2017-12-04 PROCEDURE — 0 RUBIDIUM CHLORIDE: Performed by: NURSE PRACTITIONER

## 2017-12-04 PROCEDURE — A9555 RB82 RUBIDIUM: HCPCS | Performed by: NURSE PRACTITIONER

## 2017-12-04 PROCEDURE — 78492 MYOCRD IMG PET MLT RST&STRS: CPT | Performed by: INTERNAL MEDICINE

## 2017-12-04 PROCEDURE — 25010000002 REGADENOSON 0.4 MG/5ML SOLUTION: Performed by: NURSE PRACTITIONER

## 2017-12-04 PROCEDURE — 93306 TTE W/DOPPLER COMPLETE: CPT | Performed by: INTERNAL MEDICINE

## 2017-12-04 PROCEDURE — 99214 OFFICE O/P EST MOD 30 MIN: CPT | Performed by: NURSE PRACTITIONER

## 2017-12-04 PROCEDURE — 78492 MYOCRD IMG PET MLT RST&STRS: CPT

## 2017-12-04 RX ORDER — METOPROLOL TARTRATE 50 MG/1
50 TABLET, FILM COATED ORAL 2 TIMES DAILY
Qty: 60 TABLET | Refills: 5
Start: 2017-12-04 | End: 2017-12-15 | Stop reason: ALTCHOICE

## 2017-12-04 RX ADMIN — RUBIDIUM CHLORIDE RB-82 1 DOSE: 150 INJECTION, SOLUTION INTRAVENOUS at 09:15

## 2017-12-04 RX ADMIN — REGADENOSON 0.4 MG: 0.08 INJECTION, SOLUTION INTRAVENOUS at 09:27

## 2017-12-04 RX ADMIN — RUBIDIUM CHLORIDE RB-82 1 DOSE: 150 INJECTION, SOLUTION INTRAVENOUS at 09:30

## 2017-12-04 NOTE — PROGRESS NOTES
Encounter Date:12/04/2017      Patient ID: Margarito Sosa is a 57 y.o. male.        Subjective:     Chief Complaint: Follow-up HTN, SVT, stress and echo     History of Present Illness  [facility] up on HTN and SVT. He had a stress PET today which was low risk for ischemia, echo showed diastolic dysfunction but normal systolic function. He BPs at home have been labile with ranges from 130s-180s systolic. Most SBP are around 140-150. HRs are 70-90. He denies palpitations. No chest pain.   Patient Active Problem List   Diagnosis   • Gastroesophageal reflux disease   • Anxiety   • Dyslipidemia   • Ear discharge   • Essential hypertension   • Impaired glucose tolerance   • Acute bronchitis   • Cutaneous candidiasis   • Depression   • Dizziness   • Elevated LFTs   • Excessive sweating   • Heart palpitations   • Hemoptysis   • Genital HSV   • High risk sexual behavior   • Joint pain, knee   • Muscle cramps   • Onychomycosis of toenail   • Otitis media of right ear   • Pain, wrist joint   • Pharyngitis, acute   • Seborrheic dermatitis of scalp   • Tinea pedis   • SVT (supraventricular tachycardia)         Past Surgical History:   Procedure Laterality Date   • CARDIOVERSION      with asdenoside for SVT   • FOOT SURGERY      club feet   • HERNIA REPAIR     • KNEE ACL RECONSTRUCTION     • KNEE ARTHROSCOPY      Right   • LAMINECTOMY      Lumbar lower back   • SHOULDER SURGERY      Right       Allergies   Allergen Reactions   • Penicillins Hives         Current Outpatient Prescriptions:   •  ALPRAZolam (XANAX) 1 MG tablet, Take 1 tablet by mouth Daily., Disp: 30 tablet, Rfl: 2  •  amLODIPine (NORVASC) 5 MG tablet, Take 1 tablet by mouth Daily., Disp: 30 tablet, Rfl: 5  •  azelastine (ASTELIN) 0.1 % nasal spray, 2 sprays into each nostril 2 (Two) Times a Day., Disp: , Rfl: 11  •  cyclobenzaprine (FLEXERIL) 5 MG tablet, Take 1 tablet by mouth 3 (Three) Times a Day As Needed for  Muscle Spasms., Disp: 90 tablet, Rfl: 0  •  fexofenadine (ALLEGRA) 60 MG tablet, Take 180 mg by mouth Daily., Disp: , Rfl:   •  fluticasone (FLONASE) 50 MCG/ACT nasal spray, 2 sprays into each nostril Daily As Needed., Disp: , Rfl: 11  •  HYDROcodone-acetaminophen (NORCO) 7.5-325 MG per tablet, Take 1 tablet by mouth Every 6 (Six) Hours As Needed., Disp: , Rfl:   •  ipratropium (ATROVENT) 0.06 % nasal spray, 1 spray into each nostril Daily As Needed., Disp: , Rfl:   •  losartan (COZAAR) 100 MG tablet, TAKE 1 TABLET BY MOUTH EVERY DAY, Disp: 90 tablet, Rfl: 1  •  meloxicam (MOBIC) 7.5 MG tablet, TAKE 1 TABLET BY MOUTH EVERY DAY AS NEEDED, Disp: 90 tablet, Rfl: 1  •  metoprolol tartrate (LOPRESSOR) 25 MG tablet, Take 1 tablet by mouth 2 (Two) Times a Day. Take 1 tablet PRN twice a day for fast heart rate, Disp: 60 tablet, Rfl: 2  •  nystatin (MYCOSTATIN) 614165 UNIT/ML suspension, Take  by mouth Daily As Needed., Disp: , Rfl:   •  omeprazole (priLOSEC) 20 MG capsule, Take 20 mg by mouth Daily., Disp: , Rfl: 3  •  PULMICORT FLEXHALER 180 MCG/ACT inhaler, Inhale 1 puff 2 (Two) Times a Day As Needed., Disp: , Rfl: 5  •  ranitidine (ZANTAC) 150 MG tablet, Take 150 mg by mouth Every Night., Disp: , Rfl: 3  •  tamsulosin (FLOMAX) 0.4 MG capsule 24 hr capsule, Take 0.4 mg by mouth Daily., Disp: , Rfl:   •  VENTOLIN  (90 BASE) MCG/ACT inhaler, Inhale 2 puffs 4 (Four) Times a Day Before Meals & at Bedtime As Needed., Disp: , Rfl: 0  No current facility-administered medications for this visit.     The following portions of the chart were reviewed and updated as appropriate: Allergies, current medications, past family history, social history, past medical history.     Review of Systems   Constitution: Positive for weakness and malaise/fatigue. Negative for chills, decreased appetite, diaphoresis, fever, night sweats, weight gain and weight loss.   HENT: Positive for congestion. Negative for hearing loss, hoarse voice and  "nosebleeds.         Postnasal drip   Eyes: Negative for blurred vision, visual disturbance and visual halos.   Cardiovascular: Positive for claudication, dyspnea on exertion and leg swelling. Negative for chest pain, cyanosis, irregular heartbeat, near-syncope, orthopnea, palpitations, paroxysmal nocturnal dyspnea and syncope.   Respiratory: Positive for snoring and wheezing. Negative for cough, hemoptysis, shortness of breath, sleep disturbances due to breathing and sputum production.    Endocrine: Positive for heat intolerance.   Hematologic/Lymphatic: Negative for bleeding problem. Does not bruise/bleed easily.   Skin: Negative for dry skin, itching and rash.   Musculoskeletal: Positive for joint pain and muscle cramps. Negative for arthritis, joint swelling and myalgias.   Gastrointestinal: Positive for constipation, heartburn and hematemesis. Negative for bloating, abdominal pain, diarrhea, flatus, hematochezia, melena, nausea and vomiting.   Genitourinary: Positive for nocturia. Negative for dysuria, frequency, hematuria and urgency.   Neurological: Positive for excessive daytime sleepiness. Negative for dizziness, headaches, light-headedness and loss of balance.   Psychiatric/Behavioral: Negative for depression. The patient does not have insomnia and is not nervous/anxious.    Allergic/Immunologic: Positive for environmental allergies.        Seasonal allergies           Objective:     Vitals:    12/04/17 1124 12/04/17 1126 12/04/17 1127   BP: 150/94 134/82 139/84   BP Location: Right arm Left arm Left arm   Patient Position: Sitting Sitting Standing   Cuff Size: Large Adult     Pulse: 69 71 75   Resp: 20     Temp: 97.7 °F (36.5 °C)     TempSrc: Temporal Artery      SpO2: 94%     Weight: (!) 323 lb (147 kg)     Height: 74\" (188 cm)           Physical Exam    Lab and Diagnostic Review:   Echo 12/4/17  · Left atrial cavity size is mildly dilated.  · The left ventricular cavity is moderately dilated.  · Left " ventricular systolic function is normal. Estimated EF = 60%.  · Left ventricular diastolic dysfunction (grade I) consistent with impaired relaxation.  · There is no evidence of pericardial effusion.  · No evidence of pulmonary hypertension is present.  · Normal right ventricular wall thickness, systolic function and septal motion noted with right ventricular cavity mildly dilated.  · No significant structural valvular abnormality demonstrated.  PET 12/4/17  · Chemical stress test completed without complications.  · The test is negative for anginal symptoms or diagnostic ECG changes.  · Myocardial perfusion imaging indicates a normal myocardial perfusion study with no evidence of ischemia.  · Left ventricular ejection fraction is normal (Calculated EF = 64%).  · Impressions are consistent with a low risk study.      EKG today shows NSR, HR 68  Assessment and Plan:         1. Paroxysmal SVT (supraventricular tachycardia)  - Increase metoprolol to 50mg BID for better HR/BP control  - Refer to cardiology to establish care  - ECG 12 Lead; Future    2. Essential hypertension  - Increase metoprolol to 50mg BID. Keep log of BP and HR  - Will call next week for BP/HR readings  - HTN Education provided today including signs and symptoms, medication management, daily blood pressure monitoring. Patient encouraged to call the [facility] with any abnormal readings.     3. Diastolic dysfunction  - No s/s of fluid volume overload  - Aggressive BP control and weight loss    It has been a pleasure to participate in the care of this patient.  Patient was instructed to call the [facility] with any questions, concerns, or worsening symptoms.    * Please note that portions of this note were completed with a voice recognition program. Efforts were made to edit the dictation but occasionally words are transcribed.

## 2017-12-07 ENCOUNTER — OFFICE VISIT (OUTPATIENT)
Dept: INTERNAL MEDICINE | Facility: CLINIC | Age: 57
End: 2017-12-07

## 2017-12-07 ENCOUNTER — TELEPHONE (OUTPATIENT)
Dept: INTERNAL MEDICINE | Facility: CLINIC | Age: 57
End: 2017-12-07

## 2017-12-07 VITALS
SYSTOLIC BLOOD PRESSURE: 134 MMHG | DIASTOLIC BLOOD PRESSURE: 82 MMHG | WEIGHT: 315 LBS | HEIGHT: 74 IN | HEART RATE: 73 BPM | BODY MASS INDEX: 40.43 KG/M2 | OXYGEN SATURATION: 97 %

## 2017-12-07 DIAGNOSIS — M25.561 ARTHRALGIA OF RIGHT KNEE: Primary | ICD-10-CM

## 2017-12-07 PROCEDURE — 99212 OFFICE O/P EST SF 10 MIN: CPT | Performed by: NURSE PRACTITIONER

## 2017-12-07 NOTE — PROGRESS NOTES
Chief Complaint   Patient presents with   • Med Refill     anti inflammatory medication for compund pharmacy-        [facility]  Margarito Sosa is a 57 y.o. male who presents for refill of compounded topical anti-inflammatory pain medication. Last filled 1 year ago. Uses for arthritis of right knee.    Has recently seen cardiology NP. Increased dose of metoprolol, has follow up with Dr Reyes next week.    [facility]  The following portions of the patient's history were reviewed and updated as appropriate: allergies, current medications, past family history, past medical history, past social history, past surgical history and problem list.    Past Medical History:   Diagnosis Date   • Anxiety    • Depression    • Generalized osteoarthritis    • H/O colonoscopy    • History of colon polyps     sigmoid colon   • History of diverticulosis    • History of esophageal reflux    • History of hiatal hernia    • History of irritable bowel syndrome    • History of nicotine dependence    • History of osteoarthritis    • Hypertension      Past Surgical History:   Procedure Laterality Date   • CARDIOVERSION      with asdenoside for SVT   • FOOT SURGERY      club feet   • HERNIA REPAIR     • KNEE ACL RECONSTRUCTION     • KNEE ARTHROSCOPY      Right   • LAMINECTOMY      Lumbar lower back   • SHOULDER SURGERY      Right     Patient Active Problem List    Diagnosis   • SVT (supraventricular tachycardia) [I47.1]   • Acute bronchitis [J20.9]   • Cutaneous candidiasis [B37.2]   • Depression [F32.9]   • Dizziness [R42]   • Elevated LFTs [R79.89]   • Excessive sweating [R61]   • Heart palpitations [R00.2]   • Hemoptysis [R04.2]   • Genital HSV [A60.00]   • High risk sexual behavior [Z72.51]   • Joint pain, knee [M25.569]   • Muscle cramps [R25.2]   • Onychomycosis of toenail [B35.1]   • Otitis media of right ear [H66.91]   • Pain, wrist joint [M25.539]   • Pharyngitis, acute [J02.9]   • Seborrheic dermatitis of scalp [L21.9]   • Tinea pedis  [B35.3]   • Gastroesophageal reflux disease [K21.9]     Overview Note:     Impression: 10/05/2015 - stable  Impression: 02/09/2015 - stable;      • Anxiety [F41.9]     Overview Note:     Impression: 02/09/2015 - judy  Impression: 11/07/2014 - udsjudy, refilled 30 with 2 refills.  Impression: 07/22/2014 - refilled xanax;      • Dyslipidemia [E78.5]     Overview Note:     Impression: 10/05/2015 - lipids and cmp  Impression: 06/03/2015 - stable  Impression: 02/09/2015 - lipids and cmp next week, not fasting  Impression: 11/07/2014 - lipids and cmp next week, not fasting  Impression: 07/22/2014 - stable  Impression: 04/22/2014 - lipids, cmp;      • Ear discharge [H92.10]   • Essential hypertension [I10]     Overview Note:     Impression: 06/03/2015 - elevated today. He will monitor x 3 weeks and send us numbers.  Impression: 02/09/2015 - stable  Impression: 11/07/2014 - stable  Impression: 07/22/2014 - stable  Impression: 04/22/2014 - Mild increase in DBP. He is going to work on weight loss.;      • Impaired glucose tolerance [R73.02]     Overview Note:     Impression: 10/05/2015 - a1c is  Impression: 06/03/2015 - A1c is 5.6  Impression: 02/09/2015 - a1 c 5.7  Impression: 11/07/2014 - a1c 5.4  Impression: 07/22/2014 - A1c 5.9 today.  Impression: 04/22/2014 - A1c 5.8 today.;        Social History   Substance Use Topics   • Smoking status: Former Smoker     Packs/day: 1.00     Years: 20.00     Quit date: 2012   • Smokeless tobacco: Never Used   • Alcohol use No      Comment: stopped drinking 1 month ago     Current Outpatient Prescriptions on File Prior to Visit   Medication Sig Dispense Refill   • ALPRAZolam (XANAX) 1 MG tablet Take 1 tablet by mouth Daily. 30 tablet 2   • amLODIPine (NORVASC) 5 MG tablet Take 1 tablet by mouth Daily. 30 tablet 5   • azelastine (ASTELIN) 0.1 % nasal spray 2 sprays into each nostril 2 (Two) Times a Day.  11   • cyclobenzaprine (FLEXERIL) 5 MG tablet Take 1 tablet by mouth 3 (Three)  "Times a Day As Needed for Muscle Spasms. 90 tablet 0   • fexofenadine (ALLEGRA) 60 MG tablet Take 180 mg by mouth Daily.     • fluticasone (FLONASE) 50 MCG/ACT nasal spray 2 sprays into each nostril Daily As Needed.  11   • HYDROcodone-acetaminophen (NORCO) 7.5-325 MG per tablet Take 1 tablet by mouth Every 6 (Six) Hours As Needed.     • ipratropium (ATROVENT) 0.06 % nasal spray 1 spray into each nostril Daily As Needed.     • losartan (COZAAR) 100 MG tablet TAKE 1 TABLET BY MOUTH EVERY DAY 90 tablet 1   • meloxicam (MOBIC) 7.5 MG tablet TAKE 1 TABLET BY MOUTH EVERY DAY AS NEEDED 90 tablet 1   • metoprolol tartrate (LOPRESSOR) 50 MG tablet Take 1 tablet by mouth 2 (Two) Times a Day. 60 tablet 5   • nystatin (MYCOSTATIN) 561804 UNIT/ML suspension Take  by mouth Daily As Needed.     • omeprazole (priLOSEC) 20 MG capsule Take 20 mg by mouth Daily.  3   • PULMICORT FLEXHALER 180 MCG/ACT inhaler Inhale 1 puff 2 (Two) Times a Day As Needed.  5   • ranitidine (ZANTAC) 150 MG tablet Take 150 mg by mouth Every Night.  3   • tamsulosin (FLOMAX) 0.4 MG capsule 24 hr capsule Take 0.4 mg by mouth Daily.     • VENTOLIN  (90 BASE) MCG/ACT inhaler Inhale 2 puffs 4 (Four) Times a Day Before Meals & at Bedtime As Needed.  0     No current facility-administered medications on file prior to visit.          Review of Systems   Musculoskeletal: Positive for arthralgias.           Objective   Blood pressure 134/82, pulse 73, height 188 cm (74.02\"), weight (!) 147 kg (323 lb), SpO2 97 %.  Body mass index is 41.45 kg/(m^2).      Physical Exam   Constitutional: He is oriented to person, place, and time. He appears well-developed and well-nourished. No distress.   Eyes: Conjunctivae are normal.   Pulmonary/Chest: Effort normal.   Neurological: He is alert and oriented to person, place, and time.   Skin: Skin is warm and dry.   Psychiatric: He has a normal mood and affect. His behavior is normal. Thought content normal. "         ASSESSMENT/PLAN  1. Arthralgia of right knee      Refilled Ketoprofen 15% in lipoderm 100gm  With no refills. Called to [facility] pharmacy.        Plan of care reviewed with patient at the conclusion of today's visit. Education was provided in regards to diagnosis, management and any prescribed or recommended OTC medications.  Patient verbalizes Understanding of and agreement with management plan.      FOLLOW-UP  As scheduled    Future Appointments  Date Time Provider Department Center   12/15/2017 1:00 PM Ernesto Reyes IV, MD [facility] None   1/16/2018 1:00 PM [facility]         Electronically signed by:  LISSA Andrews  12/07/2017

## 2017-12-15 ENCOUNTER — CONSULT (OUTPATIENT)
Dept: CARDIOLOGY | Facility: CLINIC | Age: 57
End: 2017-12-15

## 2017-12-15 VITALS
SYSTOLIC BLOOD PRESSURE: 152 MMHG | HEART RATE: 65 BPM | DIASTOLIC BLOOD PRESSURE: 94 MMHG | OXYGEN SATURATION: 96 % | HEIGHT: 74 IN | BODY MASS INDEX: 40.43 KG/M2 | WEIGHT: 315 LBS

## 2017-12-15 DIAGNOSIS — R06.83 SNORING: ICD-10-CM

## 2017-12-15 DIAGNOSIS — E78.5 DYSLIPIDEMIA: ICD-10-CM

## 2017-12-15 DIAGNOSIS — IMO0001 CLASS 3 OBESITY DUE TO EXCESS CALORIES WITHOUT SERIOUS COMORBIDITY IN ADULT, UNSPECIFIED BMI: ICD-10-CM

## 2017-12-15 DIAGNOSIS — I47.1 SVT (SUPRAVENTRICULAR TACHYCARDIA) (HCC): ICD-10-CM

## 2017-12-15 DIAGNOSIS — I10 ESSENTIAL HYPERTENSION: Primary | ICD-10-CM

## 2017-12-15 PROCEDURE — 99244 OFF/OP CNSLTJ NEW/EST MOD 40: CPT | Performed by: INTERNAL MEDICINE

## 2017-12-15 RX ORDER — CARVEDILOL 12.5 MG/1
12.5 TABLET ORAL 2 TIMES DAILY
Qty: 180 TABLET | Refills: 3 | Status: SHIPPED | OUTPATIENT
Start: 2017-12-15 | End: 2018-01-16

## 2017-12-15 RX ORDER — LOSARTAN POTASSIUM AND HYDROCHLOROTHIAZIDE 25; 100 MG/1; MG/1
1 TABLET ORAL DAILY
Qty: 90 TABLET | Refills: 1 | Status: SHIPPED | OUTPATIENT
Start: 2017-12-15 | End: 2018-04-17 | Stop reason: ALTCHOICE

## 2017-12-15 NOTE — ASSESSMENT & PLAN NOTE
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  We discussed radiofrequency ablation as an option for care versus continued medical therapy with beta blocker.  As the patient has not had any symptomatic recurrences, he would prefer medical therapy for the time being.

## 2017-12-15 NOTE — PROGRESS NOTES
Encounter Date:12/15/2017    Patient ID: Margarito Sosa is a  57 y.o. male who resides in [place], [place].    CC/Reason for visit:  Irregular Heart Beat (Consult) and Leg Swelling            Margarito Sosa is referred to my office by LISSA Perdomo, for SVT and hypertension. He has been experiencing episodes of his heart racing. Patient's blood pressure has been elevated for some time, with systolic getting as high as 190s. He reportedly feels improved now that he has started medications. He notes that his blood pressure has not always been out of control. He injured his back and knees and gained a significant amount of weight due to these. He states that he does snore, but is hesitant about using a CPAP mask. He states that he quit smoking approximately 5 years ago.    Review of Systems   Constitution: Positive for malaise/fatigue and weight gain. Negative for weakness.   Eyes: Negative for vision loss in left eye and vision loss in right eye.   Cardiovascular: Positive for leg swelling. Negative for chest pain, dyspnea on exertion, near-syncope, orthopnea, palpitations, paroxysmal nocturnal dyspnea and syncope.   Respiratory: Positive for snoring and wheezing.         Sleep apnea   Musculoskeletal: Positive for arthritis. Negative for myalgias.   Gastrointestinal: Positive for heartburn and hematochezia.   Genitourinary: Positive for frequency.   Neurological: Positive for excessive daytime sleepiness. Negative for brief paralysis, focal weakness, numbness and paresthesias.   Psychiatric/Behavioral: The patient is nervous/anxious.    All other systems reviewed and are negative.      The patient's past medical, social, family history and ROS reviewed in the patient's electronic medical record.    Allergies  Penicillins    Outpatient Prescriptions Marked as Taking for the 12/15/17 encounter (Consult) with Ernesto Reyes IV, MD   Medication Sig Dispense Refill   • ALPRAZolam (XANAX) 1 MG tablet Take  "1 tablet by mouth Daily. 30 tablet 2   • amLODIPine (NORVASC) 5 MG tablet Take 1 tablet by mouth Daily. 30 tablet 5   • azelastine (ASTELIN) 0.1 % nasal spray 2 sprays into each nostril 2 (Two) Times a Day.  11   • cyclobenzaprine (FLEXERIL) 5 MG tablet Take 1 tablet by mouth 3 (Three) Times a Day As Needed for Muscle Spasms. 90 tablet 0   • fexofenadine (ALLEGRA) 60 MG tablet Take 180 mg by mouth Daily.     • fluticasone (FLONASE) 50 MCG/ACT nasal spray 2 sprays into each nostril Daily As Needed.  11   • HYDROcodone-acetaminophen (NORCO) 7.5-325 MG per tablet Take 1 tablet by mouth Every 6 (Six) Hours As Needed.     • ipratropium (ATROVENT) 0.06 % nasal spray 1 spray into each nostril Daily As Needed.     • meloxicam (MOBIC) 7.5 MG tablet TAKE 1 TABLET BY MOUTH EVERY DAY AS NEEDED 90 tablet 1   • nystatin (MYCOSTATIN) 084924 UNIT/ML suspension Take  by mouth Daily As Needed.     • omeprazole (priLOSEC) 20 MG capsule Take 20 mg by mouth Daily.  3   • PULMICORT FLEXHALER 180 MCG/ACT inhaler Inhale 1 puff 2 (Two) Times a Day As Needed.  5   • ranitidine (ZANTAC) 150 MG tablet Take 150 mg by mouth Every Night.  3   • tamsulosin (FLOMAX) 0.4 MG capsule 24 hr capsule Take 0.4 mg by mouth Daily.     • VENTOLIN  (90 BASE) MCG/ACT inhaler Inhale 2 puffs 4 (Four) Times a Day Before Meals & at Bedtime As Needed.  0   • [DISCONTINUED] losartan (COZAAR) 100 MG tablet TAKE 1 TABLET BY MOUTH EVERY DAY 90 tablet 1   • [DISCONTINUED] metoprolol tartrate (LOPRESSOR) 50 MG tablet Take 1 tablet by mouth 2 (Two) Times a Day. 60 tablet 5         Blood pressure 152/94, pulse 65, height 188 cm (74\"), weight (!) 146 kg (322 lb), SpO2 96 %.  Body mass index is 41.34 kg/(m^2).    Physical Exam   Constitutional: He is oriented to person, place, and time. He appears well-developed and well-nourished.   HENT:   Head: Normocephalic and atraumatic.   Eyes: Pupils are equal, round, and reactive to light. No scleral icterus.   Neck: No JVD " present. No thyromegaly present.   Cardiovascular: Normal rate and regular rhythm.  Exam reveals no gallop.    No murmur heard.  Pulmonary/Chest: Effort normal and breath sounds normal.   Abdominal: Soft. He exhibits no distension. There is no hepatosplenomegaly.   Musculoskeletal: He exhibits no edema.   Neurological: He is alert and oriented to person, place, and time.   Skin: Skin is warm and dry.   Psychiatric: He has a normal mood and affect. His behavior is normal.       Data Review:     Lab Results   Component Value Date    GLUCOSE 106 (H) 10/23/2017    BUN 17 10/23/2017    CREATININE 0.90 10/23/2017    EGFRIFNONA 88 10/23/2017    EGFRIFAFRI 106 02/12/2015    BCR 18.9 10/23/2017    K 3.8 10/23/2017    CO2 29.0 10/23/2017    CALCIUM 8.9 10/23/2017    PROTENTOTREF 6.7 02/12/2015    ALBUMIN 4.10 10/23/2017    LABIL2 1.5 10/23/2017    AST 27 10/23/2017    ALT 45 (H) 10/23/2017     Lab Results   Component Value Date    WBC 5.52 10/23/2017    HGB 14.1 10/23/2017    HCT 41.3 10/23/2017    MCV 87.7 10/23/2017     10/23/2017     Lab Results   Component Value Date    CHOL 150 07/07/2017    TRIG 274 (H) 07/07/2017    HDL 28 (L) 07/07/2017    LDLDIRECT 79 07/07/2017    AST 27 10/23/2017    ALT 45 (H) 10/23/2017     Procedures       Problem List Items Addressed This Visit        Cardiovascular and Mediastinum    Essential hypertension - Primary    Overview     · Echo (12/4/17): LVEF 60%.  Grade 1 diastolic dysfunction.  Mild left atrial dilatation.  No significant valvular abnormality.         Current Assessment & Plan     Presently uncontrolled despite multiple drug therapy.  I do suspect undiagnosed sleep apnea playing a role in his hypertension.  I would like to add hydrochlorothiazide to his medical therapy and convert his metoprolol to carvedilol for improved blood pressure control.  I am also sending him for a sleep evaluation and hopefully treatment of sleep apnea.         Relevant Medications     losartan-hydrochlorothiazide (HYZAAR) 100-25 MG per tablet    carvedilol (COREG) 12.5 MG tablet    Other Relevant Orders    Ambulatory Referral to Sleep Medicine    Comprehensive Metabolic Panel    SVT (supraventricular tachycardia)    Overview     · Pharmacologic nuclear stress (12/4/2017): EF 64%. Normal study.  · Echo (12/4/17): LVEF 60%.  Grade 1 diastolic dysfunction.  Mild left atrial dilatation.  No significant valvular abnormality.         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  We discussed radiofrequency ablation as an option for care versus continued medical therapy with beta blocker.  As the patient has not had any symptomatic recurrences, he would prefer medical therapy for the time being.         Relevant Medications    carvedilol (COREG) 12.5 MG tablet       Respiratory    Snoring    Current Assessment & Plan     Patient reports heavy snoring and is at high risk for obstructive sleep apnea.  We will refer for sleep evaluation.         Relevant Orders    Ambulatory Referral to Sleep Medicine       Other    Class 3 obesity in adult    Dyslipidemia    Relevant Orders    Comprehensive Metabolic Panel    Lipid Panel               · Discontinue metoprolol  · Start carvedilol 12.5 mg twice a day  · Add hydrochlorothiazide 25 mg daily to losartan 100 mg daily  · CMP and lipids in 2 weeks  · Referral for sleep evaluation  Return in about 6 months (around 6/15/2018).      Ernesto Reyes IV, MD  12/15/2017    Scribed for Ernesto Reyes IV, MD by Glynn Sneed. 12/15/2017  2:31 PM

## 2017-12-15 NOTE — ASSESSMENT & PLAN NOTE
Patient reports heavy snoring and is at high risk for obstructive sleep apnea.  We will refer for sleep evaluation.

## 2017-12-20 RX ORDER — AMLODIPINE BESYLATE 5 MG/1
TABLET ORAL
Qty: 30 TABLET | Refills: 5 | Status: SHIPPED | OUTPATIENT
Start: 2017-12-20 | End: 2018-06-24 | Stop reason: SDUPTHER

## 2018-01-08 RX ORDER — MELOXICAM 7.5 MG/1
TABLET ORAL
Qty: 30 TABLET | Refills: 0 | Status: SHIPPED | OUTPATIENT
Start: 2018-01-08 | End: 2018-02-04 | Stop reason: SDUPTHER

## 2018-01-16 ENCOUNTER — OFFICE VISIT (OUTPATIENT)
Dept: INTERNAL MEDICINE | Facility: CLINIC | Age: 58
End: 2018-01-16

## 2018-01-16 VITALS
HEART RATE: 91 BPM | DIASTOLIC BLOOD PRESSURE: 80 MMHG | BODY MASS INDEX: 42.81 KG/M2 | SYSTOLIC BLOOD PRESSURE: 120 MMHG | WEIGHT: 315 LBS | OXYGEN SATURATION: 97 %

## 2018-01-16 DIAGNOSIS — Z00.00 HEALTH CARE MAINTENANCE: Primary | ICD-10-CM

## 2018-01-16 LAB
ALBUMIN SERPL-MCNC: 4.4 G/DL (ref 3.2–4.8)
ALBUMIN/GLOB SERPL: 1.8 G/DL (ref 1.5–2.5)
ALP SERPL-CCNC: 71 U/L (ref 25–100)
ALT SERPL W P-5'-P-CCNC: 39 U/L (ref 7–40)
ANION GAP SERPL CALCULATED.3IONS-SCNC: 6 MMOL/L (ref 3–11)
ARTICHOKE IGE QN: 97 MG/DL (ref 0–130)
AST SERPL-CCNC: 28 U/L (ref 0–33)
BILIRUB BLD-MCNC: NEGATIVE MG/DL
BILIRUB SERPL-MCNC: 0.5 MG/DL (ref 0.3–1.2)
BUN BLD-MCNC: 12 MG/DL (ref 9–23)
BUN/CREAT SERPL: 13.3 (ref 7–25)
CALCIUM SPEC-SCNC: 9.1 MG/DL (ref 8.7–10.4)
CHLORIDE SERPL-SCNC: 102 MMOL/L (ref 99–109)
CHOLEST SERPL-MCNC: 152 MG/DL (ref 0–200)
CLARITY, POC: CLEAR
CO2 SERPL-SCNC: 30 MMOL/L (ref 20–31)
COLOR UR: YELLOW
CREAT BLD-MCNC: 0.9 MG/DL (ref 0.6–1.3)
GFR SERPL CREATININE-BSD FRML MDRD: 88 ML/MIN/1.73
GLOBULIN UR ELPH-MCNC: 2.5 GM/DL
GLUCOSE BLD-MCNC: 88 MG/DL (ref 70–100)
GLUCOSE BLDC GLUCOMTR-MCNC: 99 MG/DL (ref 70–130)
GLUCOSE UR STRIP-MCNC: NEGATIVE MG/DL
HBA1C MFR BLD: 5.7 %
HDLC SERPL-MCNC: 27 MG/DL (ref 40–60)
KETONES UR QL: NEGATIVE
LEUKOCYTE EST, POC: NEGATIVE
NITRITE UR-MCNC: NEGATIVE MG/ML
PH UR: 7 [PH] (ref 5–8)
POTASSIUM BLD-SCNC: 4 MMOL/L (ref 3.5–5.5)
PROT SERPL-MCNC: 6.9 G/DL (ref 5.7–8.2)
PROT UR STRIP-MCNC: NEGATIVE MG/DL
RBC # UR STRIP: NEGATIVE /UL
SODIUM BLD-SCNC: 138 MMOL/L (ref 132–146)
SP GR UR: 1.01 (ref 1–1.03)
TRIGL SERPL-MCNC: 360 MG/DL (ref 0–150)
TSH SERPL DL<=0.05 MIU/L-ACNC: 3.98 MIU/ML (ref 0.35–5.35)
UROBILINOGEN UR QL: NORMAL

## 2018-01-16 PROCEDURE — 81003 URINALYSIS AUTO W/O SCOPE: CPT | Performed by: INTERNAL MEDICINE

## 2018-01-16 PROCEDURE — 80061 LIPID PANEL: CPT | Performed by: INTERNAL MEDICINE

## 2018-01-16 PROCEDURE — 83036 HEMOGLOBIN GLYCOSYLATED A1C: CPT | Performed by: INTERNAL MEDICINE

## 2018-01-16 PROCEDURE — 84443 ASSAY THYROID STIM HORMONE: CPT | Performed by: INTERNAL MEDICINE

## 2018-01-16 PROCEDURE — 82947 ASSAY GLUCOSE BLOOD QUANT: CPT | Performed by: INTERNAL MEDICINE

## 2018-01-16 PROCEDURE — 99396 PREV VISIT EST AGE 40-64: CPT | Performed by: INTERNAL MEDICINE

## 2018-01-16 PROCEDURE — 80053 COMPREHEN METABOLIC PANEL: CPT | Performed by: INTERNAL MEDICINE

## 2018-01-16 RX ORDER — METOPROLOL TARTRATE 50 MG/1
50 TABLET, FILM COATED ORAL 2 TIMES DAILY
Qty: 180 TABLET | Refills: 0 | Status: SHIPPED | OUTPATIENT
Start: 2018-01-16 | End: 2018-04-20 | Stop reason: SDUPTHER

## 2018-01-16 NOTE — PROGRESS NOTES
Chief Complaint   Patient presents with   • Annual Exam           Reported Health  Good Yes  FairNo  PoorNo      Dental,Vision,Hearing  Regular dental visitsYes  Vision ProblemsYes  Hearing LossYes      Immunization Status:  Up To DateYes        Lifestyle  Healthy DietYes  Weight ConcernsYes  Regular ExerciseYes  Tobacco UseNo  Alcohol UseNo  Drug AbuseNo  Previous smoker 1 PPPD x 4years only, quit 4 years ago    Screening  Cancer ScreeningYes  Metabolic ScreeningYes  Risk ScreeningYes  Past Medical History:   Diagnosis Date   • Anxiety    • Depression    • Generalized osteoarthritis    • H/O colonoscopy    • History of colon polyps     sigmoid colon   • History of diverticulosis    • History of esophageal reflux    • History of hiatal hernia    • History of irritable bowel syndrome    • History of nicotine dependence    • History of osteoarthritis    • Hypertension      Past Surgical History:   Procedure Laterality Date   • CARDIOVERSION      with asdenoside for SVT   • FOOT SURGERY      club feet   • HERNIA REPAIR     • KNEE ACL RECONSTRUCTION     • KNEE ARTHROSCOPY      Right   • LAMINECTOMY      Lumbar lower back   • SHOULDER SURGERY      Right     Family History   Problem Relation Age of Onset   • Stroke Father    • Hypertension Father    • Diabetes Father    • Cancer Father    • Depression Other    • Osteoarthritis Other    • Cancer Other    • Lung cancer Other    • No Known Problems Mother    • Breast cancer Sister    • No Known Problems Maternal Grandmother    • Liver cancer Maternal Grandfather    • No Known Problems Paternal Grandmother    • No Known Problems Paternal Grandfather      Social History     Social History   • Marital status:      Spouse name: N/A   • Number of children: N/A   • Years of education: N/A     Occupational History   • Disabled      Social History Main Topics   • Smoking status: Former Smoker     Packs/day: 1.00     Years: 20.00     Quit date: 2012   • Smokeless tobacco:  Never Used   • Alcohol use No      Comment: stopped drinking 1 month ago   • Drug use: No   • Sexual activity: Defer      Comment: High Risk sexual behavior/      Other Topics Concern   • Not on file     Social History Narrative    Caffeine:  0-1 servings per day    Patient lives at home with his wife.         Review of Systems   Constitutional: Negative for activity change, appetite change, chills, diaphoresis, fatigue, fever and unexpected weight change.   HENT: Negative for congestion, ear discharge, ear pain, mouth sores, nosebleeds, sinus pressure, sneezing and sore throat.    Eyes: Negative for pain, discharge and itching.   Respiratory: Negative for cough, chest tightness, shortness of breath and wheezing.    Cardiovascular: Negative for chest pain, palpitations and leg swelling.   Gastrointestinal: Negative for abdominal pain, constipation, diarrhea, nausea and vomiting.   Endocrine: Negative for cold intolerance, heat intolerance, polydipsia and polyphagia.   Genitourinary: Negative for dysuria, flank pain, frequency, hematuria and urgency.   Musculoskeletal: Negative for arthralgias, back pain, gait problem, myalgias, neck pain and neck stiffness.   Skin: Negative for color change, pallor and rash.   Neurological: Negative for seizures, speech difficulty, numbness and headaches.   Psychiatric/Behavioral: Negative for agitation, confusion, decreased concentration and sleep disturbance. The patient is not nervous/anxious.        /80  Pulse 91  Wt (!) 151 kg (333 lb 6.4 oz)  SpO2 97%  BMI 42.81 kg/m2      Physical Exam   Constitutional: He is oriented to person, place, and time. He appears well-developed.   HENT:   Head: Normocephalic.   Right Ear: External ear normal.   Left Ear: External ear normal.   Nose: Nose normal.   Mouth/Throat: Oropharynx is clear and moist.   Eyes: Conjunctivae are normal. Pupils are equal, round, and reactive to light.   Neck: No JVD present. No thyromegaly  present.   Cardiovascular: Normal rate, regular rhythm and normal heart sounds.  Exam reveals no friction rub.    No murmur heard.  Pulmonary/Chest: Effort normal and breath sounds normal. No respiratory distress. He has no wheezes. He has no rales.   Abdominal: Soft. Bowel sounds are normal. He exhibits no distension. There is no tenderness. There is no guarding.   Genitourinary:   Genitourinary Comments: Refused exam   Musculoskeletal: He exhibits no edema or tenderness.   Lymphadenopathy:     He has no cervical adenopathy.   Neurological: He is oriented to person, place, and time. He displays normal reflexes. No cranial nerve deficit.   Skin: No rash noted.   Psychiatric: His behavior is normal.   Nursing note and vitals reviewed.        Diet and Exercise    Healthy Diet Yes  Adequate DietYes  Poor DietNo  Adequate Exercise RegimenYes  Inadequate Exercise RegimenNo      Prostate Cancer Screening    Risks and benefits discussedYes  Screening currentYes  PSA OrderedNo  PSA Not IndicatedNo      Testicular Cancer screening  Risks and Benefits DiscussedYes  Self Testicular Exam taughtYes  Monthly Self Exam AdvisedYes  Screening CurrentYes  Clinical Testicular Exam DoneNo  Screening Not IndicatedNo      STD Testing  ChlamydiaNo  GonorrheaNo  HIVNo      Colorectal Cancer Screening  Risks and Benefits DiscussedYes  Screening currentYes  FOBT Supplies givenYes  FOBT Every YearYes  Colonoscopy OrderedNo  Colonoscopy every 5 yearsYes  Colonoscopy every 10 yearsNo  Screening not indicatedNo  Patient declinesNo    Metabolic Screening  GlucoseYes  LipidsNo  CBCNo  TSHYes  UANo  CMPYes  25OHYes      Immunizations  Risks and benefits discussedYes  Immunizations Up To DateYes  Immunizations NeededNo  Immunizations Per Ordersno  Patient DNoeclines      Preventative Counseling  NutritionYes  Aerobic ExerciseYes  Weight Bearing ExerciseYes  Weight LossYes  Calcium SupplementsYes  Vitamin D SupplementsYes  Reproductive  HealthNo  Cardiovascular Risk ReductionYes  Tobacco CessationNo  Alcohol UseNo  Sunscreen UseYes  Self Skin ExaminationNo  Helmet UseNo  Seat Belt UseYes  Fall Risk ReductionNo  Advanced Directive PlanningNo      Patient Discussion  PatientYes  FamilyNo  CounselingYes    Margarito was seen today for annual exam.    Diagnoses and all orders for this visit:    Health care maintenance  -     POC Urinalysis Dipstick, Automated    Other orders  -     POC Glucose  -     POC Glycosylated Hemoglobin (Hb A1C)

## 2018-01-19 ENCOUNTER — TELEPHONE (OUTPATIENT)
Dept: INTERNAL MEDICINE | Facility: CLINIC | Age: 58
End: 2018-01-19

## 2018-02-04 RX ORDER — MELOXICAM 7.5 MG/1
TABLET ORAL
Qty: 30 TABLET | Refills: 0 | Status: SHIPPED | OUTPATIENT
Start: 2018-02-04 | End: 2018-03-22 | Stop reason: SDUPTHER

## 2018-02-20 DIAGNOSIS — F41.9 ANXIETY: ICD-10-CM

## 2018-02-20 RX ORDER — ALPRAZOLAM 1 MG/1
TABLET ORAL
Qty: 30 TABLET | Refills: 2 | Status: CANCELLED | OUTPATIENT
Start: 2018-02-20

## 2018-02-21 DIAGNOSIS — F41.9 ANXIETY: ICD-10-CM

## 2018-02-21 RX ORDER — ALPRAZOLAM 1 MG/1
1 TABLET ORAL DAILY
Qty: 30 TABLET | Refills: 2 | Status: SHIPPED | OUTPATIENT
Start: 2018-02-21 | End: 2018-10-09 | Stop reason: SDUPTHER

## 2018-02-22 RX ORDER — ALPRAZOLAM 1 MG/1
TABLET ORAL
Qty: 30 TABLET | Refills: 2 | OUTPATIENT
Start: 2018-02-22

## 2018-02-23 DIAGNOSIS — F41.9 ANXIETY: ICD-10-CM

## 2018-02-24 RX ORDER — ALPRAZOLAM 1 MG/1
TABLET ORAL
Qty: 30 TABLET | Refills: 2 | OUTPATIENT
Start: 2018-02-24

## 2018-03-02 DIAGNOSIS — F41.9 ANXIETY: ICD-10-CM

## 2018-03-05 RX ORDER — ALPRAZOLAM 1 MG/1
1 TABLET ORAL DAILY
Qty: 30 TABLET | Refills: 2 | OUTPATIENT
Start: 2018-03-05

## 2018-03-22 RX ORDER — MELOXICAM 7.5 MG/1
TABLET ORAL
Qty: 30 TABLET | Refills: 0 | Status: SHIPPED | OUTPATIENT
Start: 2018-03-22 | End: 2018-05-02 | Stop reason: SDUPTHER

## 2018-03-23 ENCOUNTER — HOSPITAL ENCOUNTER (OUTPATIENT)
Dept: GENERAL RADIOLOGY | Facility: HOSPITAL | Age: 58
Discharge: HOME | End: 2018-03-23
Admitting: NURSE PRACTITIONER

## 2018-03-23 ENCOUNTER — OFFICE VISIT (OUTPATIENT)
Dept: INTERNAL MEDICINE | Facility: CLINIC | Age: 58
End: 2018-03-23

## 2018-03-23 VITALS
HEART RATE: 84 BPM | OXYGEN SATURATION: 99 % | SYSTOLIC BLOOD PRESSURE: 126 MMHG | WEIGHT: 315 LBS | BODY MASS INDEX: 43.18 KG/M2 | DIASTOLIC BLOOD PRESSURE: 88 MMHG

## 2018-03-23 DIAGNOSIS — M25.562 ACUTE PAIN OF LEFT KNEE: ICD-10-CM

## 2018-03-23 DIAGNOSIS — M25.562 ACUTE PAIN OF LEFT KNEE: Primary | ICD-10-CM

## 2018-03-23 PROCEDURE — 73560 X-RAY EXAM OF KNEE 1 OR 2: CPT

## 2018-03-23 PROCEDURE — 99213 OFFICE O/P EST LOW 20 MIN: CPT | Performed by: NURSE PRACTITIONER

## 2018-03-23 RX ORDER — LOSARTAN POTASSIUM 100 MG/1
100 TABLET ORAL DAILY
Refills: 1 | COMMUNITY
Start: 2018-02-23 | End: 2018-05-31 | Stop reason: SDUPTHER

## 2018-03-23 RX ORDER — ERYTHROMYCIN 5 MG/G
OINTMENT OPHTHALMIC
Refills: 0 | COMMUNITY
Start: 2018-03-02 | End: 2018-04-17

## 2018-03-23 NOTE — PROGRESS NOTES
Subjective  Same Day (Left Knee Injury)      Margarito Sosa is a 57 y.o. male.   Allergies   Allergen Reactions   • Penicillins Hives     History of Present Illness      Left knee pain x 2 weeks is constant but waxes and wanes , put a brace on it yesterday but that did not help , not worse with palpation, has tried mobic  W/o relief, no numbness/tingling down leg   The following portions of the patient's history were reviewed and updated as appropriate: allergies, past medical history, past surgical history and problem list.    Review of Systems   Musculoskeletal: Positive for arthralgias.   All other systems reviewed and are negative.      Objective   Physical Exam   Constitutional: He appears well-developed and well-nourished.   HENT:   Head: Normocephalic and atraumatic.   Cardiovascular: Normal rate.    Pulmonary/Chest: Effort normal.   Musculoskeletal:        Right knee: Tenderness found.   Skin: Skin is warm and dry.   Psychiatric: He has a normal mood and affect. His behavior is normal. Judgment and thought content normal.   Nursing note and vitals reviewed.    /88   Pulse 84   Wt (!) 153 kg (336 lb 4.8 oz)   SpO2 99%   BMI 43.18 kg/m²     Assessment/Plan     Problem List Items Addressed This Visit     None      Visit Diagnoses     Acute pain of left knee    -  Primary    Relevant Orders    XR Knee 1 or 2 View Left        Pt is wanting surgical consult today , I have explained we will get xray esults and do from there and he verbalizes understanding     keep all appts with Marquez

## 2018-04-11 RX ORDER — FAMCICLOVIR 500 MG/1
TABLET ORAL
Qty: 21 TABLET | Refills: 0 | Status: SHIPPED | OUTPATIENT
Start: 2018-04-11 | End: 2018-04-17 | Stop reason: CLARIF

## 2018-04-17 ENCOUNTER — OFFICE VISIT (OUTPATIENT)
Dept: INTERNAL MEDICINE | Facility: CLINIC | Age: 58
End: 2018-04-17

## 2018-04-17 VITALS
SYSTOLIC BLOOD PRESSURE: 118 MMHG | DIASTOLIC BLOOD PRESSURE: 81 MMHG | WEIGHT: 315 LBS | BODY MASS INDEX: 43.37 KG/M2 | OXYGEN SATURATION: 96 % | HEART RATE: 62 BPM

## 2018-04-17 DIAGNOSIS — R05.9 COUGH: ICD-10-CM

## 2018-04-17 DIAGNOSIS — F32.89 OTHER DEPRESSION: ICD-10-CM

## 2018-04-17 DIAGNOSIS — I10 ESSENTIAL HYPERTENSION: Primary | ICD-10-CM

## 2018-04-17 DIAGNOSIS — E78.5 DYSLIPIDEMIA: ICD-10-CM

## 2018-04-17 DIAGNOSIS — R73.02 IMPAIRED GLUCOSE TOLERANCE: ICD-10-CM

## 2018-04-17 DIAGNOSIS — K21.9 GASTROESOPHAGEAL REFLUX DISEASE, ESOPHAGITIS PRESENCE NOT SPECIFIED: ICD-10-CM

## 2018-04-17 LAB
GLUCOSE BLDC GLUCOMTR-MCNC: 129 MG/DL (ref 70–130)
HBA1C MFR BLD: 5.7 %

## 2018-04-17 PROCEDURE — 82947 ASSAY GLUCOSE BLOOD QUANT: CPT | Performed by: INTERNAL MEDICINE

## 2018-04-17 PROCEDURE — 83036 HEMOGLOBIN GLYCOSYLATED A1C: CPT | Performed by: INTERNAL MEDICINE

## 2018-04-17 PROCEDURE — 99214 OFFICE O/P EST MOD 30 MIN: CPT | Performed by: INTERNAL MEDICINE

## 2018-04-17 RX ORDER — VALACYCLOVIR HYDROCHLORIDE 500 MG/1
500 TABLET, FILM COATED ORAL DAILY
Qty: 30 TABLET | Refills: 3 | Status: SHIPPED | OUTPATIENT
Start: 2018-04-17 | End: 2018-07-06 | Stop reason: SDUPTHER

## 2018-04-17 NOTE — PROGRESS NOTES
Subjective   Margarito Sosa is a 57 y.o. male.     Hypertension   This is a chronic problem. The current episode started more than 1 year ago. Associated symptoms include anxiety. Pertinent negatives include no chest pain, headaches, neck pain, palpitations or shortness of breath. Compliance problems include diet and exercise.    Heartburn   He reports no abdominal pain, no chest pain, no coughing (months), no nausea, no sore throat or no wheezing. This is a chronic problem. The current episode started more than 1 year ago. Pertinent negatives include no fatigue.   Depression   Visit Type: follow-up  Patient is not experiencing: confusion, decreased concentration, nervousness/anxiety, palpitations, shortness of breath, suicidal planning, thoughts of death and weight gain.    Anxiety   Patient reports no chest pain, confusion, decreased concentration, nausea, nervous/anxious behavior, palpitations or shortness of breath.     His past medical history is significant for depression.        The following portions of the patient's history were reviewed and updated as appropriate: allergies, current medications, past family history, past medical history, past social history, past surgical history and problem list.    Review of Systems   Constitutional: Negative for activity change, appetite change, chills, diaphoresis, fatigue, fever, unexpected weight change and weight gain.   HENT: Negative for congestion, ear discharge, ear pain, mouth sores, nosebleeds, sinus pressure, sneezing and sore throat.    Eyes: Negative for pain, discharge and itching.   Respiratory: Negative for cough (months), chest tightness, shortness of breath and wheezing.    Cardiovascular: Negative for chest pain, palpitations and leg swelling.   Gastrointestinal: Negative for abdominal pain, constipation, diarrhea, nausea and vomiting.   Endocrine: Negative for cold intolerance, heat intolerance, polydipsia and polyphagia.   Genitourinary: Negative  for dysuria, flank pain, frequency, hematuria and urgency.   Musculoskeletal: Negative for arthralgias, back pain, gait problem, myalgias, neck pain and neck stiffness.        Chronic back pain     Skin: Negative for color change, pallor and rash.   Neurological: Negative for seizures, speech difficulty, numbness and headaches.   Psychiatric/Behavioral: Negative for agitation, confusion, decreased concentration and sleep disturbance. The patient is not nervous/anxious.      /81   Pulse 62   Wt (!) 153 kg (337 lb 12.8 oz)   SpO2 96%   BMI 43.37 kg/m²     Objective   Physical Exam   Constitutional: He appears well-developed.   HENT:   Head: Normocephalic.   Right Ear: External ear normal.   Left Ear: External ear normal.   Nose: Nose normal.   Mouth/Throat: Oropharynx is clear and moist.   Eyes: Conjunctivae are normal. Pupils are equal, round, and reactive to light.   Neck: No JVD present. No thyromegaly present.   Cardiovascular: Normal rate, regular rhythm and normal heart sounds.  Exam reveals no friction rub.    No murmur heard.  Pulmonary/Chest: Effort normal and breath sounds normal. No respiratory distress. He has no wheezes. He has no rales.   Abdominal: Soft. Bowel sounds are normal. He exhibits no distension. There is no tenderness. There is no guarding.   Musculoskeletal: He exhibits no edema or tenderness.   Lymphadenopathy:     He has no cervical adenopathy.   Neurological: He displays normal reflexes. No cranial nerve deficit.   Skin: No rash noted.   Psychiatric: His behavior is normal.   Nursing note and vitals reviewed.      Assessment/Plan   Margarito was seen today for hypertension, heartburn, depression and prediabetes.    Diagnoses and all orders for this visit:    Impaired glucose tolerance  -     POC Glucose Fingerstick  -     POC Glycosylated Hemoglobin (Hb A1C)    Essential hypertension  -     Comprehensive Metabolic Panel  -     Lipid Panel  Added norvasc 5 mg po qd  Gastroesophageal  reflux disease, esophagitis presence not specified  stable  Dyslipidemia  -     Comprehensive Metabolic Panel  -     Lipid Panel    Other depression  stable  Anxiety  Has been on xanax x years and unable to wean.      Other orders  -     amLODIPine (NORVASC) 5 MG tablet; Take 1 tablet by mouth Daily.  -     cyclobenzaprine (FLEXERIL) 5 MG tablet; Take 1 tablet by mouth 3 (Three) Times a Day As Needed (back pain).      Cough    CXR  Pulm ref.  The patient has read and signed the [facility] [employer] Substance Contract.  I will continue to see patient for regular follow up appointments.  They are well controlled on their medication.  GENEVA is updated every 3 months. The patient is aware of the potential for addiction and dependence.    The patient has read and signed the [facility] [employer] Substance Contract.  I will continue to see patient for regular follow up appointments.  They are well controlled on their medication.  GENEVA is updated every 3 months. The patient is aware of the potential for addiction. The patient has read and signed the [facility] [employer] Substance Contract.  I will continue to see patient for regular follow up appointments.  They are well controlled on their medication.  GENEVA is updated every 3 months. The patient is aware of the potential for addiction and dependence.on and dependence.

## 2018-04-20 ENCOUNTER — HOSPITAL ENCOUNTER (OUTPATIENT)
Dept: GENERAL RADIOLOGY | Facility: HOSPITAL | Age: 58
Discharge: HOME | End: 2018-04-20
Attending: INTERNAL MEDICINE | Admitting: INTERNAL MEDICINE

## 2018-04-20 DIAGNOSIS — R05.9 COUGH: ICD-10-CM

## 2018-04-20 PROCEDURE — 71046 X-RAY EXAM CHEST 2 VIEWS: CPT

## 2018-04-20 RX ORDER — METOPROLOL TARTRATE 50 MG/1
50 TABLET, FILM COATED ORAL 2 TIMES DAILY
Qty: 180 TABLET | Refills: 0 | Status: SHIPPED | OUTPATIENT
Start: 2018-04-20 | End: 2018-08-06 | Stop reason: SDUPTHER

## 2018-04-26 ENCOUNTER — HOSPITAL ENCOUNTER (OUTPATIENT)
Dept: PULMONOLOGY | Facility: HOSPITAL | Age: 58
Discharge: HOME | End: 2018-04-26
Attending: INTERNAL MEDICINE | Admitting: INTERNAL MEDICINE

## 2018-04-26 DIAGNOSIS — R05.9 COUGH: ICD-10-CM

## 2018-04-26 PROCEDURE — 94727 GAS DIL/WSHOT DETER LNG VOL: CPT | Performed by: INTERNAL MEDICINE

## 2018-04-26 PROCEDURE — 94060 EVALUATION OF WHEEZING: CPT

## 2018-04-26 PROCEDURE — 94729 DIFFUSING CAPACITY: CPT | Performed by: INTERNAL MEDICINE

## 2018-04-26 PROCEDURE — 94060 EVALUATION OF WHEEZING: CPT | Performed by: INTERNAL MEDICINE

## 2018-04-26 PROCEDURE — 94729 DIFFUSING CAPACITY: CPT

## 2018-04-26 PROCEDURE — 94727 GAS DIL/WSHOT DETER LNG VOL: CPT

## 2018-05-02 RX ORDER — MELOXICAM 7.5 MG/1
TABLET ORAL
Qty: 30 TABLET | Refills: 0 | Status: SHIPPED | OUTPATIENT
Start: 2018-05-02 | End: 2018-05-21 | Stop reason: SDUPTHER

## 2018-05-21 RX ORDER — MELOXICAM 7.5 MG/1
TABLET ORAL
Qty: 30 TABLET | Refills: 0 | Status: SHIPPED | OUTPATIENT
Start: 2018-05-21 | End: 2018-06-21 | Stop reason: SDUPTHER

## 2018-05-23 RX ORDER — OMEPRAZOLE 20 MG/1
20 CAPSULE, DELAYED RELEASE ORAL DAILY
Qty: 30 CAPSULE | Refills: 3 | Status: SHIPPED | OUTPATIENT
Start: 2018-05-23 | End: 2018-08-19 | Stop reason: SDUPTHER

## 2018-05-23 NOTE — TELEPHONE ENCOUNTER
PHARMACY HAS NOT RECIEVED THE PRESCRIPTION FOR THE MOBIC (SENT 05/21/2018-RESEND PLEASE) OR OMEPRAZOLE.

## 2018-05-24 RX ORDER — LOSARTAN POTASSIUM 100 MG/1
TABLET ORAL
Qty: 90 TABLET | Refills: 1 | OUTPATIENT
Start: 2018-05-24

## 2018-05-24 NOTE — TELEPHONE ENCOUNTER
PATIENT CALLED TO FOLLOW UP ON THIS REQUEST AFTER BEING TOLD BY THE PHARMACY THAT THIS REQUEST WAS DENIED. HE STATES THAT HE HAS BEEN TAKING THIS MEDICATION FOR SOME TIME AND DOES NOT REMEMBER WHO THE ORIGINAL PRESCRIBER WAS. HE WOULD LIKE TO KNOW IF THIS CAN BE ADDED TO HIS MED LIST AND REFILLED.    CALL BACK [phone]

## 2018-05-31 ENCOUNTER — CLINICAL SUPPORT (OUTPATIENT)
Dept: INTERNAL MEDICINE | Facility: CLINIC | Age: 58
End: 2018-05-31

## 2018-05-31 PROCEDURE — 90471 IMMUNIZATION ADMIN: CPT | Performed by: INTERNAL MEDICINE

## 2018-05-31 PROCEDURE — 90632 HEPA VACCINE ADULT IM: CPT | Performed by: INTERNAL MEDICINE

## 2018-05-31 RX ORDER — LOSARTAN POTASSIUM 100 MG/1
100 TABLET ORAL DAILY
Qty: 90 TABLET | Refills: 0 | Status: SHIPPED | OUTPATIENT
Start: 2018-05-31 | End: 2018-08-30 | Stop reason: SDUPTHER

## 2018-06-04 ENCOUNTER — CLINICAL SUPPORT (OUTPATIENT)
Dept: INTERNAL MEDICINE | Facility: CLINIC | Age: 58
End: 2018-06-04

## 2018-06-04 DIAGNOSIS — Z23 VACCINE FOR VZV (VARICELLA-ZOSTER VIRUS): ICD-10-CM

## 2018-06-04 PROCEDURE — 90471 IMMUNIZATION ADMIN: CPT | Performed by: INTERNAL MEDICINE

## 2018-06-04 PROCEDURE — 90736 HZV VACCINE LIVE SUBQ: CPT | Performed by: INTERNAL MEDICINE

## 2018-06-21 RX ORDER — MELOXICAM 7.5 MG/1
TABLET ORAL
Qty: 30 TABLET | Refills: 0 | Status: SHIPPED | OUTPATIENT
Start: 2018-06-21 | End: 2018-08-09 | Stop reason: SDUPTHER

## 2018-06-24 RX ORDER — AMLODIPINE BESYLATE 5 MG/1
TABLET ORAL
Qty: 30 TABLET | Refills: 5 | Status: SHIPPED | OUTPATIENT
Start: 2018-06-24 | End: 2018-12-14 | Stop reason: SDUPTHER

## 2018-07-06 ENCOUNTER — OFFICE VISIT (OUTPATIENT)
Dept: INTERNAL MEDICINE | Facility: CLINIC | Age: 58
End: 2018-07-06

## 2018-07-06 VITALS
SYSTOLIC BLOOD PRESSURE: 110 MMHG | OXYGEN SATURATION: 96 % | WEIGHT: 315 LBS | BODY MASS INDEX: 43.11 KG/M2 | DIASTOLIC BLOOD PRESSURE: 80 MMHG | HEART RATE: 79 BPM

## 2018-07-06 DIAGNOSIS — R21 RASH: ICD-10-CM

## 2018-07-06 DIAGNOSIS — E78.5 DYSLIPIDEMIA: Primary | ICD-10-CM

## 2018-07-06 LAB
ALBUMIN SERPL-MCNC: 4.38 G/DL (ref 3.2–4.8)
ALBUMIN/GLOB SERPL: 1.7 G/DL (ref 1.5–2.5)
ALP SERPL-CCNC: 66 U/L (ref 25–100)
ALT SERPL W P-5'-P-CCNC: 35 U/L (ref 7–40)
ANION GAP SERPL CALCULATED.3IONS-SCNC: 6 MMOL/L (ref 3–11)
ARTICHOKE IGE QN: 83 MG/DL (ref 0–130)
AST SERPL-CCNC: 30 U/L (ref 0–33)
BILIRUB SERPL-MCNC: 0.7 MG/DL (ref 0.3–1.2)
BUN BLD-MCNC: 16 MG/DL (ref 9–23)
BUN/CREAT SERPL: 13.8 (ref 7–25)
CALCIUM SPEC-SCNC: 9 MG/DL (ref 8.7–10.4)
CHLORIDE SERPL-SCNC: 101 MMOL/L (ref 99–109)
CHOLEST SERPL-MCNC: 138 MG/DL (ref 0–200)
CO2 SERPL-SCNC: 32 MMOL/L (ref 20–31)
CREAT BLD-MCNC: 1.16 MG/DL (ref 0.6–1.3)
GFR SERPL CREATININE-BSD FRML MDRD: 65 ML/MIN/1.73
GLOBULIN UR ELPH-MCNC: 2.5 GM/DL
GLUCOSE BLD-MCNC: 102 MG/DL (ref 70–100)
HDLC SERPL-MCNC: 27 MG/DL (ref 40–60)
POTASSIUM BLD-SCNC: 4.4 MMOL/L (ref 3.5–5.5)
PROT SERPL-MCNC: 6.9 G/DL (ref 5.7–8.2)
SODIUM BLD-SCNC: 139 MMOL/L (ref 132–146)
TRIGL SERPL-MCNC: 223 MG/DL (ref 0–150)

## 2018-07-06 PROCEDURE — 80053 COMPREHEN METABOLIC PANEL: CPT | Performed by: INTERNAL MEDICINE

## 2018-07-06 PROCEDURE — 80061 LIPID PANEL: CPT | Performed by: INTERNAL MEDICINE

## 2018-07-06 PROCEDURE — 99214 OFFICE O/P EST MOD 30 MIN: CPT | Performed by: INTERNAL MEDICINE

## 2018-07-06 RX ORDER — VALACYCLOVIR HYDROCHLORIDE 500 MG/1
500 TABLET, FILM COATED ORAL DAILY
Qty: 30 TABLET | Refills: 0 | Status: SHIPPED | OUTPATIENT
Start: 2018-07-06 | End: 2020-10-12

## 2018-07-06 NOTE — PROGRESS NOTES
"Subjective   Margarito Sosa is a 57 y.o. male.   Chief Complaint   Patient presents with   • Rash       History of Present Illness   Rash on penis x months. Has seen urology and DERM. Not herpes. Tried nystatin and did not help. Not painful. \"just there\". No fever. No pain.  Hx hlp and need labs today.  The following portions of the patient's history were reviewed and updated as appropriate: allergies, current medications, past family history, past medical history, past social history, past surgical history and problem list.    Review of Systems   Constitutional: Negative for activity change, appetite change, chills, diaphoresis, fatigue, fever and unexpected weight change.   HENT: Negative for congestion, ear discharge, ear pain, mouth sores, nosebleeds, sinus pressure, sneezing and sore throat.    Eyes: Negative for pain, discharge and itching.   Respiratory: Negative for cough, chest tightness, shortness of breath and wheezing.    Cardiovascular: Negative for chest pain, palpitations and leg swelling.   Gastrointestinal: Negative for abdominal pain, constipation, diarrhea, nausea and vomiting.   Endocrine: Negative for cold intolerance, heat intolerance, polydipsia and polyphagia.   Genitourinary: Negative for dysuria, flank pain, frequency, hematuria and urgency.   Musculoskeletal: Negative for arthralgias, back pain, gait problem, myalgias, neck pain and neck stiffness.   Skin: Negative for color change, pallor and rash.   Neurological: Negative for seizures, speech difficulty, numbness and headaches.   Psychiatric/Behavioral: Negative for agitation, confusion, decreased concentration and sleep disturbance. The patient is not nervous/anxious.      /80   Pulse 79   Wt (!) 152 kg (335 lb 12.8 oz)   SpO2 96%   BMI 43.11 kg/m²     Objective   Physical Exam   Constitutional: He appears well-developed and well-nourished.   HENT:   Head: Normocephalic and atraumatic.   Right Ear: External ear normal. "   Left Ear: External ear normal.   Nose: Nose normal.   Mouth/Throat: Oropharynx is clear and moist.   Eyes: Conjunctivae and EOM are normal. Pupils are equal, round, and reactive to light.   Neck: Normal range of motion. Neck supple. No JVD present. No thyromegaly present.   Cardiovascular: Normal rate, regular rhythm, normal heart sounds and intact distal pulses.  Exam reveals no gallop and no friction rub.    No murmur heard.  Pulmonary/Chest: Breath sounds normal. No respiratory distress. He has no wheezes. He has no rales. He exhibits no tenderness.   Abdominal: Soft. Bowel sounds are normal. He exhibits no distension and no mass. There is no tenderness. There is no rebound and no guarding. No hernia.   Genitourinary: Rectum normal, prostate normal and penis normal.   Lymphadenopathy:     He has no cervical adenopathy.   Neurological: He displays normal reflexes. No cranial nerve deficit. He exhibits normal muscle tone. Coordination normal.   Skin: No rash noted. No erythema. No pallor.   Penile shaft, mild erythema. No discharge.    Psychiatric: He has a normal mood and affect.       Assessment/Plan   Margarito was seen today for rash.    Diagnoses and all orders for this visit:    Dyslipidemia  -     Comprehensive Metabolic Panel  -     Lipid Panel    Rash  Appears to be irritation , aquaphor BID  Other orders  -     valACYclovir (VALTREX) 500 MG tablet; Take 1 tablet by mouth Daily.

## 2018-08-06 RX ORDER — METOPROLOL TARTRATE 50 MG/1
50 TABLET, FILM COATED ORAL 2 TIMES DAILY
Qty: 180 TABLET | Refills: 0 | Status: SHIPPED | OUTPATIENT
Start: 2018-08-06 | End: 2018-11-04 | Stop reason: SDUPTHER

## 2018-08-09 RX ORDER — MELOXICAM 7.5 MG/1
TABLET ORAL
Qty: 30 TABLET | Refills: 0 | Status: SHIPPED | OUTPATIENT
Start: 2018-08-09 | End: 2018-09-09 | Stop reason: SDUPTHER

## 2018-08-19 RX ORDER — OMEPRAZOLE 20 MG/1
CAPSULE, DELAYED RELEASE ORAL
Qty: 30 CAPSULE | Refills: 0 | Status: SHIPPED | OUTPATIENT
Start: 2018-08-19

## 2018-08-21 ENCOUNTER — OFFICE VISIT (OUTPATIENT)
Dept: INTERNAL MEDICINE | Facility: CLINIC | Age: 58
End: 2018-08-21

## 2018-08-21 VITALS
SYSTOLIC BLOOD PRESSURE: 130 MMHG | OXYGEN SATURATION: 97 % | HEART RATE: 88 BPM | WEIGHT: 315 LBS | DIASTOLIC BLOOD PRESSURE: 80 MMHG | BODY MASS INDEX: 43.29 KG/M2

## 2018-08-21 DIAGNOSIS — E78.5 DYSLIPIDEMIA: ICD-10-CM

## 2018-08-21 DIAGNOSIS — F41.9 ANXIETY: ICD-10-CM

## 2018-08-21 DIAGNOSIS — F32.9 REACTIVE DEPRESSION: ICD-10-CM

## 2018-08-21 DIAGNOSIS — I10 ESSENTIAL HYPERTENSION: ICD-10-CM

## 2018-08-21 DIAGNOSIS — R05.9 COUGH: ICD-10-CM

## 2018-08-21 DIAGNOSIS — K21.9 GASTROESOPHAGEAL REFLUX DISEASE, ESOPHAGITIS PRESENCE NOT SPECIFIED: ICD-10-CM

## 2018-08-21 DIAGNOSIS — R73.02 IMPAIRED GLUCOSE TOLERANCE: Primary | ICD-10-CM

## 2018-08-21 LAB
GLUCOSE BLDC GLUCOMTR-MCNC: 168 MG/DL (ref 70–130)
HBA1C MFR BLD: 6 %

## 2018-08-21 PROCEDURE — 82947 ASSAY GLUCOSE BLOOD QUANT: CPT | Performed by: INTERNAL MEDICINE

## 2018-08-21 PROCEDURE — 83036 HEMOGLOBIN GLYCOSYLATED A1C: CPT | Performed by: INTERNAL MEDICINE

## 2018-08-21 PROCEDURE — 99214 OFFICE O/P EST MOD 30 MIN: CPT | Performed by: INTERNAL MEDICINE

## 2018-08-21 NOTE — PROGRESS NOTES
Subjective   Margarito Sosa is a 58 y.o. male.     Anxiety   Patient reports no chest pain, confusion, decreased concentration, nausea, nervous/anxious behavior, palpitations or shortness of breath.     His past medical history is significant for depression.   Heartburn   He complains of coughing (months). He reports no abdominal pain, no chest pain, no nausea, no sore throat or no wheezing. This is a chronic problem. The current episode started more than 1 year ago. Pertinent negatives include no fatigue.   Hypertension   This is a chronic problem. The current episode started more than 1 year ago. Associated symptoms include anxiety. Pertinent negatives include no chest pain, headaches, neck pain, palpitations or shortness of breath. Compliance problems include diet and exercise.    Depression   Visit Type: follow-up  Patient is not experiencing: confusion, decreased concentration, nervousness/anxiety, palpitations, shortness of breath, suicidal planning, thoughts of death and weight gain.     Cough x months. Not getting any better. No fever or chills Had CXR.  No SOB. Quit smoking  5 years ago. With hx 1PPD x 6 years.    The following portions of the patient's history were reviewed and updated as appropriate: allergies, current medications, past family history, past medical history, past social history, past surgical history and problem list.    Review of Systems   Constitutional: Negative for activity change, appetite change, chills, diaphoresis, fatigue, fever, unexpected weight change and weight gain.   HENT: Negative for congestion, ear discharge, ear pain, mouth sores, nosebleeds, sinus pressure, sneezing and sore throat.    Eyes: Negative for pain, discharge and itching.   Respiratory: Positive for cough (months). Negative for chest tightness, shortness of breath and wheezing.    Cardiovascular: Negative for chest pain, palpitations and leg swelling.   Gastrointestinal: Negative for abdominal pain,  constipation, diarrhea, nausea and vomiting.        Reflux with occasional wheeze   Endocrine: Negative for cold intolerance, heat intolerance, polydipsia and polyphagia.   Genitourinary: Negative for dysuria, flank pain, frequency, hematuria and urgency.   Musculoskeletal: Negative for arthralgias, back pain, gait problem, myalgias, neck pain and neck stiffness.        Chronic back pain     Skin: Negative for color change, pallor and rash.   Neurological: Negative for seizures, speech difficulty, numbness and headaches.   Psychiatric/Behavioral: Negative for agitation, confusion, decreased concentration and sleep disturbance. The patient is not nervous/anxious.      /80   Pulse 88   Wt (!) 153 kg (337 lb 3.2 oz)   SpO2 97%   BMI 43.29 kg/m²     Objective   Physical Exam   Constitutional: He appears well-developed.   HENT:   Head: Normocephalic.   Right Ear: External ear normal.   Left Ear: External ear normal.   Nose: Nose normal.   Mouth/Throat: Oropharynx is clear and moist.   Eyes: Pupils are equal, round, and reactive to light. Conjunctivae are normal.   Neck: No JVD present. No thyromegaly present.   Cardiovascular: Normal rate, regular rhythm and normal heart sounds.  Exam reveals no friction rub.    No murmur heard.  Pulmonary/Chest: Effort normal and breath sounds normal. No respiratory distress. He has no wheezes. He has no rales.   Abdominal: Soft. Bowel sounds are normal. He exhibits no distension. There is no tenderness. There is no guarding.   Musculoskeletal: He exhibits no edema or tenderness.   Lymphadenopathy:     He has no cervical adenopathy.   Neurological: He displays normal reflexes. No cranial nerve deficit.   Skin: No rash noted.   Psychiatric: His behavior is normal.   Nursing note and vitals reviewed.      Assessment/Plan   Margarito was seen today for hypertension, heartburn, depression and prediabetes.    Diagnoses and all orders for this visit:    Impaired glucose tolerance  -      POC Glucose Fingerstick  -     POC Glycosylated Hemoglobin (Hb A1C)    Essential hypertension  -     Comprehensive Metabolic Panel  -     Lipid Panel  Added norvasc 5 mg po qd  Gastroesophageal reflux disease, esophagitis presence not specified  EGD  Dyslipidemia  -     Comprehensive Metabolic Panel  -     Lipid Panel    Other depression  stable  Anxiety  Has been on xanax x years and unable to wean.      Other orders  -     amLODIPine (NORVASC) 5 MG tablet; Take 1 tablet by mouth Daily.  -     cyclobenzaprine (FLEXERIL) 5 MG tablet; Take 1 tablet by mouth 3 (Three) Times a Day As Needed (back pain).      Cough  Ct chest. Pulm appt again.  The patient has read and signed the [facility] [employer] Substance Contract.  I will continue to see patient for regular follow up appointments.  They are well controlled on their medication.  GENEVA is updated every 3 months. The patient is aware of the potential for addiction and dependence.    The patient has read and signed the [facility] [employer] Substance Contract.  I will continue to see patient for regular follow up appointments.  They are well controlled on their medication.  GENEVA is updated every 3 months. The patient is aware of the potential for addiction. The patient has read and signed the [facility] [employer] Substance Contract.  I will continue to see patient for regular follow up appointments.  They are well controlled on their medication.  GENEVA is updated every 3 months. The patient is aware of the potential for addiction and dependence.on and dependence.

## 2018-08-23 ENCOUNTER — HOSPITAL ENCOUNTER (OUTPATIENT)
Dept: CT IMAGING | Facility: HOSPITAL | Age: 58
Discharge: HOME | End: 2018-08-23
Attending: INTERNAL MEDICINE | Admitting: INTERNAL MEDICINE

## 2018-08-23 DIAGNOSIS — R05.9 COUGH: ICD-10-CM

## 2018-08-23 PROCEDURE — 71250 CT THORAX DX C-: CPT

## 2018-08-30 RX ORDER — LOSARTAN POTASSIUM 100 MG/1
TABLET ORAL
Qty: 90 TABLET | Refills: 0 | Status: SHIPPED | OUTPATIENT
Start: 2018-08-30 | End: 2018-11-20 | Stop reason: SDUPTHER

## 2018-09-06 ENCOUNTER — TELEPHONE (OUTPATIENT)
Dept: INTERNAL MEDICINE | Facility: CLINIC | Age: 58
End: 2018-09-06

## 2018-09-06 NOTE — TELEPHONE ENCOUNTER
It did not show any lesions in lung. Showed cysts on liver. F/u in 6 weeks and we will get an us of is liver

## 2018-09-06 NOTE — TELEPHONE ENCOUNTER
PATIENT SAID HE SPOKE TO YOU LAST WEEK ABOUT HIS CT SCAN OF CHEST. THE RESULTS SHOWED SOME SPOTS ON THE LUNG AND WAS TOLD TO FOLLOW UP IN 6 WEEKS. PATIENT IS WANTING A COPY OF THE REPORT AND TO SEE IF HE NEEDS TO BE SEEN SOONER.

## 2018-09-10 RX ORDER — MELOXICAM 7.5 MG/1
TABLET ORAL
Qty: 30 TABLET | Refills: 0 | Status: SHIPPED | OUTPATIENT
Start: 2018-09-10 | End: 2018-10-09 | Stop reason: SDUPTHER

## 2018-10-09 ENCOUNTER — OFFICE VISIT (OUTPATIENT)
Dept: INTERNAL MEDICINE | Facility: CLINIC | Age: 58
End: 2018-10-09

## 2018-10-09 VITALS
SYSTOLIC BLOOD PRESSURE: 138 MMHG | OXYGEN SATURATION: 98 % | DIASTOLIC BLOOD PRESSURE: 90 MMHG | BODY MASS INDEX: 43.32 KG/M2 | HEART RATE: 68 BPM | WEIGHT: 315 LBS

## 2018-10-09 DIAGNOSIS — F41.9 ANXIETY: ICD-10-CM

## 2018-10-09 DIAGNOSIS — G89.29 CHRONIC FOOT PAIN, UNSPECIFIED LATERALITY: ICD-10-CM

## 2018-10-09 DIAGNOSIS — M79.673 CHRONIC FOOT PAIN, UNSPECIFIED LATERALITY: ICD-10-CM

## 2018-10-09 DIAGNOSIS — K76.89 HEPATIC CYST: Primary | ICD-10-CM

## 2018-10-09 DIAGNOSIS — R05.3 PERSISTENT COUGH FOR 3 WEEKS OR LONGER: ICD-10-CM

## 2018-10-09 PROCEDURE — 99214 OFFICE O/P EST MOD 30 MIN: CPT | Performed by: INTERNAL MEDICINE

## 2018-10-09 RX ORDER — AZELASTINE HYDROCHLORIDE 137 UG/1
SPRAY, METERED NASAL EVERY 12 HOURS SCHEDULED
COMMUNITY
End: 2019-01-14

## 2018-10-09 RX ORDER — MELOXICAM 7.5 MG/1
TABLET ORAL
Qty: 90 TABLET | Refills: 0 | Status: SHIPPED | OUTPATIENT
Start: 2018-10-09 | End: 2018-12-31 | Stop reason: SDUPTHER

## 2018-10-09 RX ORDER — TESTOSTERONE CYPIONATE 200 MG/ML
2.5 INJECTION, SOLUTION INTRAMUSCULAR
COMMUNITY
End: 2019-05-06

## 2018-10-09 RX ORDER — ALPRAZOLAM 1 MG/1
1 TABLET ORAL DAILY
Qty: 30 TABLET | Refills: 2 | Status: SHIPPED | OUTPATIENT
Start: 2018-10-09 | End: 2019-02-14 | Stop reason: SDUPTHER

## 2018-10-09 NOTE — PROGRESS NOTES
Subjective   Margarito Sosa is a 58 y.o. male.   Chief Complaint   Patient presents with   • Follow-up     6 weeks follow up    • Heartburn   • Anxiety       History of Present Illness   6 week f/u for GERD Has EGD vera.  Ct scan of abdomen showed hepatic cyst.  No nausea or vomiting.   The following portions of the patient's history were reviewed and updated as appropriate: allergies, current medications, past family history, past medical history, past social history, past surgical history and problem list.    Review of Systems   Constitutional: Negative for activity change, appetite change, chills, diaphoresis, fatigue, fever and unexpected weight change.   HENT: Negative for congestion, ear discharge, ear pain, mouth sores, nosebleeds, sinus pressure, sneezing and sore throat.    Eyes: Negative for pain, discharge and itching.   Respiratory: Positive for cough. Negative for chest tightness, shortness of breath and wheezing.    Cardiovascular: Negative for chest pain, palpitations and leg swelling.   Gastrointestinal: Negative for abdominal pain, constipation, diarrhea, nausea and vomiting.   Endocrine: Negative for cold intolerance, heat intolerance, polydipsia and polyphagia.   Genitourinary: Negative for dysuria, flank pain, frequency, hematuria and urgency.   Musculoskeletal: Negative for arthralgias, back pain, gait problem, myalgias, neck pain and neck stiffness.        Chronic foot pain   Skin: Negative for color change, pallor and rash.   Neurological: Negative for seizures, speech difficulty, numbness and headaches.   Psychiatric/Behavioral: Negative for agitation, confusion, decreased concentration and sleep disturbance. The patient is not nervous/anxious.      /90   Pulse 68   Wt (!) 153 kg (337 lb 6.4 oz)   SpO2 98%   BMI 43.32 kg/m²     Objective   Physical Exam   Constitutional: He appears well-developed and well-nourished.   HENT:   Head: Normocephalic and atraumatic.   Right Ear:  External ear normal.   Left Ear: External ear normal.   Nose: Nose normal.   Mouth/Throat: Oropharynx is clear and moist.   Eyes: Pupils are equal, round, and reactive to light. Conjunctivae and EOM are normal.   Neck: Normal range of motion. Neck supple. No JVD present. No thyromegaly present.   Cardiovascular: Normal rate, regular rhythm, normal heart sounds and intact distal pulses.  Exam reveals no gallop and no friction rub.    No murmur heard.  Pulmonary/Chest: Effort normal and breath sounds normal. No respiratory distress. He has no wheezes. He has no rales. He exhibits no tenderness.   Abdominal: Soft. Bowel sounds are normal. He exhibits no distension and no mass. There is no tenderness. There is no rebound and no guarding. No hernia.   Genitourinary: Rectum normal, prostate normal and penis normal.   Lymphadenopathy:     He has no cervical adenopathy.   Neurological: He displays normal reflexes. No cranial nerve deficit. He exhibits normal muscle tone. Coordination normal.   Skin: No rash noted. No erythema. No pallor.   Psychiatric: He has a normal mood and affect.       Assessment/Plan   Margarito was seen today for follow-up, heartburn and anxiety.    Diagnoses and all orders for this visit:    Hepatic cyst  -     US Liver; Future    Anxiety  -     ALPRAZolam (XANAX) 1 MG tablet; Take 1 tablet by mouth Daily.    Chronic foot pain, unspecified laterality  -     meloxicam (MOBIC) 7.5 MG tablet; One a day    Persistent cough for 3 weeks or longer  -     Ambulatory Referral to Pulmonology        The patient has read and signed the [facility] Controlled Substance Contract.  I will continue to see patient for regular follow up appointments.  They are well controlled on their medication.  GENEVA is updated every 3 months. The patient is aware of the potential for addiction and dependence.

## 2018-10-15 ENCOUNTER — FLU SHOT (OUTPATIENT)
Dept: INTERNAL MEDICINE | Facility: CLINIC | Age: 58
End: 2018-10-15

## 2018-10-15 ENCOUNTER — HOSPITAL ENCOUNTER (OUTPATIENT)
Dept: ULTRASOUND IMAGING | Facility: HOSPITAL | Age: 58
Discharge: HOME | End: 2018-10-15
Attending: INTERNAL MEDICINE | Admitting: INTERNAL MEDICINE

## 2018-10-15 DIAGNOSIS — Z29.9 PREVENTIVE MEASURE: Primary | ICD-10-CM

## 2018-10-15 DIAGNOSIS — K76.89 HEPATIC CYST: ICD-10-CM

## 2018-10-15 PROCEDURE — 76705 ECHO EXAM OF ABDOMEN: CPT

## 2018-10-15 PROCEDURE — 90674 CCIIV4 VAC NO PRSV 0.5 ML IM: CPT | Performed by: INTERNAL MEDICINE

## 2018-10-15 PROCEDURE — 90471 IMMUNIZATION ADMIN: CPT | Performed by: INTERNAL MEDICINE

## 2018-10-24 ENCOUNTER — OFFICE VISIT (OUTPATIENT)
Dept: PULMONOLOGY | Facility: CLINIC | Age: 58
End: 2018-10-24

## 2018-10-24 VITALS
BODY MASS INDEX: 42.66 KG/M2 | DIASTOLIC BLOOD PRESSURE: 82 MMHG | OXYGEN SATURATION: 96 % | HEIGHT: 72 IN | TEMPERATURE: 98.6 F | WEIGHT: 315 LBS | SYSTOLIC BLOOD PRESSURE: 140 MMHG | HEART RATE: 86 BPM

## 2018-10-24 DIAGNOSIS — J30.9 ALLERGIC RHINITIS, UNSPECIFIED SEASONALITY, UNSPECIFIED TRIGGER: ICD-10-CM

## 2018-10-24 DIAGNOSIS — R05.3 CHRONIC COUGH: Primary | ICD-10-CM

## 2018-10-24 DIAGNOSIS — K21.9 GASTROESOPHAGEAL REFLUX DISEASE, ESOPHAGITIS PRESENCE NOT SPECIFIED: ICD-10-CM

## 2018-10-24 PROCEDURE — 99214 OFFICE O/P EST MOD 30 MIN: CPT | Performed by: NURSE PRACTITIONER

## 2018-10-24 RX ORDER — PANTOPRAZOLE SODIUM 40 MG/1
40 TABLET, DELAYED RELEASE ORAL 2 TIMES DAILY
Refills: 2 | COMMUNITY
Start: 2018-10-19 | End: 2018-10-24

## 2018-10-24 NOTE — PROGRESS NOTES
[facility] Pulmonary Follow up    CHIEF COMPLAINT    Chronic cough    HISTORY OF PRESENT ILLNESS    Margarito Sosa is a 58 y.o.male here today for follow-up on chronic cough.    He was last in our office in March 2016.  At that time he was seen by Dr. Arteaga and LISSA Olguin for hemoptysis.  Dr. Arteaga offered him a bronchoscopy for airway inspection but he initially declined.  When he saw Suzanne in follow-up he had changed his mind and was interested in pursuing bronchoscopy. Apparently this was never performed and he never followed up.     Dr. Richardson has referred him back to our office for a chronic cough.  He reports that he has had this cough for years.  He states it is productive with gray sputum and intermittent blood streaks.    He was evaluated by Dr. Rodrigez with ENT who performed allergy endoscopy and explained that his throat showed signs of reflux.  He has been followed by GI and has an EGD scheduled.  He is currently on omeprazole.  He is following reflux precautions including not eating after 8 PM and avoiding trigger foods.      He has a history of allergic rhinitis but has several medications on his med list including Allegra, Astelin, Flonase, and Atrovent nasal spray.  He reports that he just takes these as needed but feels that his nasal drainage is well controlled.    He had PFTs performed in April of this year that showed no airway obstruction, normal lung volumes, normal diffusion.  At some point though he has been started on Pulmicort for reactive airway disease.  He reports that Pulmicort does help him expectorate but that he still coughs quite a bit.      Patient Active Problem List   Diagnosis   • Gastroesophageal reflux disease   • Anxiety   • Dyslipidemia   • Essential hypertension   • Impaired glucose tolerance   • Cutaneous candidiasis   • Reactive depression   • Elevated LFTs   • Excessive sweating   • Genital HSV   • High risk sexual behavior   • Joint pain, knee   •  Muscle cramps   • Onychomycosis of toenail   • Pain, wrist joint   • Seborrheic dermatitis of scalp   • Tinea pedis   • SVT (supraventricular tachycardia) (CMS/HCC)   • Class 3 obesity in adult   • Snoring       Allergies   Allergen Reactions   • Penicillins Hives       Current Outpatient Prescriptions:   •  ALPRAZolam (XANAX) 1 MG tablet, Take 1 tablet by mouth Daily., Disp: 30 tablet, Rfl: 2  •  amLODIPine (NORVASC) 5 MG tablet, TAKE 1 TABLET BY MOUTH DAILY., Disp: 30 tablet, Rfl: 5  •  azelastine (ASTELIN) 0.1 % nasal spray, 2 sprays into each nostril 2 (Two) Times a Day., Disp: , Rfl: 11  •  Azelastine HCl 137 MCG/SPRAY solution, Every 12 (Twelve) Hours., Disp: , Rfl:   •  fexofenadine (ALLEGRA) 60 MG tablet, Take 180 mg by mouth Daily., Disp: , Rfl:   •  fluticasone (FLONASE) 50 MCG/ACT nasal spray, 2 sprays into each nostril Daily As Needed., Disp: , Rfl: 11  •  HYDROcodone-acetaminophen (NORCO) 7.5-325 MG per tablet, Take 1 tablet by mouth Every 6 (Six) Hours As Needed., Disp: , Rfl:   •  losartan (COZAAR) 100 MG tablet, TAKE 1 TABLET BY MOUTH EVERY DAY, Disp: 90 tablet, Rfl: 0  •  meloxicam (MOBIC) 7.5 MG tablet, One a day, Disp: 90 tablet, Rfl: 0  •  metoprolol tartrate (LOPRESSOR) 50 MG tablet, TAKE 1 TABLET BY MOUTH 2 (TWO) TIMES A DAY., Disp: 180 tablet, Rfl: 0  •  omeprazole (priLOSEC) 20 MG capsule, TAKE 1 CAPSULE BY MOUTH EVERY DAY, Disp: 30 capsule, Rfl: 0  •  PULMICORT FLEXHALER 180 MCG/ACT inhaler, Inhale 1 puff 2 (Two) Times a Day As Needed., Disp: , Rfl: 5  •  ranitidine (ZANTAC) 150 MG tablet, Take 150 mg by mouth Every Night., Disp: , Rfl: 3  •  tamsulosin (FLOMAX) 0.4 MG capsule 24 hr capsule, Take 0.4 mg by mouth Daily., Disp: , Rfl:   •  Testosterone Cypionate (DEPO-TESTOSTERONE) 200 MG/ML injection, 2.5 mL., Disp: , Rfl:   •  VENTOLIN  (90 BASE) MCG/ACT inhaler, Inhale 2 puffs 4 (Four) Times a Day Before Meals & at Bedtime As Needed., Disp: , Rfl: 0  •  cyclobenzaprine (FLEXERIL) 5  "MG tablet, Take 1 tablet by mouth 3 (Three) Times a Day As Needed for Muscle Spasms., Disp: 90 tablet, Rfl: 0  •  ipratropium (ATROVENT) 0.06 % nasal spray, 1 spray into each nostril Daily As Needed., Disp: , Rfl:   •  nystatin (MYCOSTATIN) 869746 UNIT/ML suspension, nystatin 100,000 unit/mL oral suspension  5ML, swish and gargle x1 minute then spit 3-5 times daily for 21 days, Disp: , Rfl:   •  valACYclovir (VALTREX) 500 MG tablet, Take 1 tablet by mouth Daily., Disp: 30 tablet, Rfl: 0  MEDICATION LIST AND ALLERGIES REVIEWED.    Social History   Substance Use Topics   • Smoking status: Former Smoker     Packs/day: 1.00     Years: 20.00     Quit date: 2012   • Smokeless tobacco: Never Used   • Alcohol use No      Comment: stopped drinking 1 month ago       FAMILY AND SOCIAL HISTORY REVIEWED.    Review of Systems   HENT: Positive for postnasal drip and sore throat.    Respiratory: Positive for cough and wheezing.    Musculoskeletal: Positive for back pain.   Psychiatric/Behavioral: The patient is nervous/anxious.    .    /82   Pulse 86   Temp 98.6 °F (37 °C)   Ht 182.9 cm (72\")   Wt (!) 154 kg (340 lb)   SpO2 96% Comment: RA @ REST  BMI 46.11 kg/m²     Immunization History   Administered Date(s) Administered   • Flu Mist 11/25/2015   • Flu Vaccine Quad PF >18YRS 10/23/2017   • Hepatitis A 05/31/2018   • MMR 07/14/1982   • Tdap 09/18/2012   • Varicella 07/14/1982   • Zostavax 06/04/2018   • flucelvax quad pfs =>4 YRS 10/15/2018   • influenza Split 11/04/2016       Physical Exam   Constitutional: He is oriented to person, place, and time. He appears well-developed. No distress.   HENT:   Head: Normocephalic and atraumatic.   Neck: Neck supple. No tracheal deviation present.   Cardiovascular: Normal rate and regular rhythm.    No murmur heard.  Pulmonary/Chest: Effort normal. No stridor. No respiratory distress. He has no wheezes. He has no rales. He exhibits no tenderness.   Lymphadenopathy:     He has no " cervical adenopathy.   Neurological: He is oriented to person, place, and time.   Skin: Skin is warm and dry.   Psychiatric: He has a normal mood and affect.   Vitals reviewed.        RESULTS    Ct Chest Without Contrast    Result Date: 8/23/2018  1. No acute intrathoracic findings specifically no dominant nodule or focal consolidation. 2. Low-attenuation well-defined lesions within the bilateral hepatic lobes consistent with hepatic cysts in the absence of prior known malignancy.   This report was finalized on 8/23/2018 2:00 PM by Dr. Eliot Blevins.      PROBLEM LIST    Problem List Items Addressed This Visit        Digestive    Gastroesophageal reflux disease    Overview     Impression: 10/05/2015 - stable  Impression: 02/09/2015 - stable;            Other Visit Diagnoses     Chronic cough    -  Primary    Allergic rhinitis, unspecified seasonality, unspecified trigger                DISCUSSION    This is a 58-year-old former smoker here today with complaints of a chronic cough.  He does have allergic rhinitis and GERD which could be contributing to his cough, however he is already on multiple therapies for these and reports that his symptoms are well controlled.  He is very interested in pursuing bronchoscopy as this was offered to him when we last saw him in 2016.  I would like to discuss this with Dr. Arteaga and see if he is agreeable to like any further workup done prior to proceeding.  In the meantime I recommended that he continue to follow-up with GI and have his EGD as scheduled.  I will contact him with Dr. Arteaga is agreeable to bronchoscopy.  We will go ahead and set him up for a follow-up appointment with Dr. Arteaga in about 3 months.      I spent 25 minutes with the patient. I spent > 50% percent of this time counseling and discussing diagnosis, diagnostic testing, current status and treatment options.    Carolina Ortiz, LISSA  10/24/33006:42 PM  Electronically signed     Please note that  portions of this note were completed with a voice recognition program. Efforts were made to edit the dictations, but occasionally words are mistranscribed.      CC: Francesca Richardson, DO

## 2018-11-02 ENCOUNTER — TELEPHONE (OUTPATIENT)
Dept: INTERNAL MEDICINE | Facility: CLINIC | Age: 58
End: 2018-11-02

## 2018-11-02 NOTE — TELEPHONE ENCOUNTER
Patient came in an needs a refills on   His Gabapentin/Ketopforen/Magnesium Chloride 6% 15% 15% Cream. And he needs this called to the pharmacy

## 2018-11-05 RX ORDER — METOPROLOL TARTRATE 50 MG/1
50 TABLET, FILM COATED ORAL 2 TIMES DAILY
Qty: 180 TABLET | Refills: 0 | Status: SHIPPED | OUTPATIENT
Start: 2018-11-05 | End: 2019-02-19 | Stop reason: SDUPTHER

## 2018-11-06 ENCOUNTER — TELEPHONE (OUTPATIENT)
Dept: INTERNAL MEDICINE | Facility: CLINIC | Age: 58
End: 2018-11-06

## 2018-11-06 NOTE — TELEPHONE ENCOUNTER
CALL FROM PT STATING WHEN HE WAS SEEN IN CLINIC LAST HE WAS SUPPOSED TO HAVE A RX SENT OVER TO A [facility]Y. HE STATES THAT THEY ARE TELLING HIM THEY HAVE NOT RECEIVED THIS YET AT [facility]. HE WOULD LIKE FOR THIS TO BE CHECKED INTO. HE STATES THE NUMBER FOR CALL AT [facility] [phone] AND TO ASK FOR HUGO.

## 2018-11-06 NOTE — TELEPHONE ENCOUNTER
Called [facility] and mentioned that per Dr. Richardson he can have the one months with one refill.   Called pt left SHIRLEY and Linsey from [facility] is calling him to let him know when to come and  the medicine.

## 2018-11-20 RX ORDER — LOSARTAN POTASSIUM 100 MG/1
100 TABLET ORAL DAILY
Qty: 90 TABLET | Refills: 0 | Status: SHIPPED | OUTPATIENT
Start: 2018-11-20 | End: 2019-02-20 | Stop reason: SDUPTHER

## 2018-11-27 RX ORDER — CYCLOBENZAPRINE HCL 5 MG
TABLET ORAL
Qty: 90 TABLET | Refills: 0 | Status: SHIPPED | OUTPATIENT
Start: 2018-11-27 | End: 2019-01-09 | Stop reason: SDUPTHER

## 2018-11-30 ENCOUNTER — CLINICAL SUPPORT (OUTPATIENT)
Dept: INTERNAL MEDICINE | Facility: CLINIC | Age: 58
End: 2018-11-30

## 2018-11-30 DIAGNOSIS — Z23 NEED FOR VACCINATION: ICD-10-CM

## 2018-11-30 PROCEDURE — 90632 HEPA VACCINE ADULT IM: CPT | Performed by: INTERNAL MEDICINE

## 2018-11-30 PROCEDURE — 90471 IMMUNIZATION ADMIN: CPT | Performed by: INTERNAL MEDICINE

## 2018-11-30 RX ORDER — ALBUTEROL SULFATE 2.5 MG/3ML
2.5 SOLUTION RESPIRATORY (INHALATION) 4 TIMES DAILY PRN
Qty: 125 VIAL | Refills: 11 | Status: ON HOLD | OUTPATIENT
Start: 2018-11-30 | End: 2020-10-14

## 2018-11-30 RX ORDER — ALBUTEROL SULFATE 90 UG/1
2 AEROSOL, METERED RESPIRATORY (INHALATION) EVERY 4 HOURS PRN
Qty: 18 G | Refills: 4 | Status: SHIPPED | OUTPATIENT
Start: 2018-11-30 | End: 2019-11-07 | Stop reason: SDUPTHER

## 2018-11-30 NOTE — TELEPHONE ENCOUNTER
Pt stopped by the office to see about getting an inhaler be sent to pharmacy due to Rx Ventolin being . Confirmed with Rafa) okay to send in Rx Albuterol, Atrovent, and Albuterol Nebulizer. Reordered Rx Albuterol Inhaler, Albuterol Nebulizer and Rx Atrovent Inhaler per chart via fax. Pt instructed on all three. Pt verbalized understanding.

## 2018-12-14 DIAGNOSIS — I10 HYPERTENSION, UNSPECIFIED TYPE: Primary | ICD-10-CM

## 2018-12-14 RX ORDER — AMLODIPINE BESYLATE 5 MG/1
5 TABLET ORAL DAILY
Qty: 90 TABLET | Refills: 0 | Status: SHIPPED | OUTPATIENT
Start: 2018-12-14 | End: 2019-05-24 | Stop reason: SDUPTHER

## 2018-12-31 DIAGNOSIS — M79.673 CHRONIC FOOT PAIN, UNSPECIFIED LATERALITY: ICD-10-CM

## 2018-12-31 DIAGNOSIS — G89.29 CHRONIC FOOT PAIN, UNSPECIFIED LATERALITY: ICD-10-CM

## 2018-12-31 RX ORDER — MELOXICAM 7.5 MG/1
TABLET ORAL
Qty: 30 TABLET | Refills: 0 | Status: SHIPPED | OUTPATIENT
Start: 2018-12-31 | End: 2019-01-28 | Stop reason: SDUPTHER

## 2019-01-10 RX ORDER — CYCLOBENZAPRINE HCL 5 MG
TABLET ORAL
Qty: 90 TABLET | Refills: 0 | Status: SHIPPED | OUTPATIENT
Start: 2019-01-10 | End: 2019-03-20 | Stop reason: SDUPTHER

## 2019-01-14 ENCOUNTER — OFFICE VISIT (OUTPATIENT)
Dept: PULMONOLOGY | Facility: CLINIC | Age: 59
End: 2019-01-14

## 2019-01-14 VITALS
BODY MASS INDEX: 42.66 KG/M2 | OXYGEN SATURATION: 94 % | WEIGHT: 315 LBS | SYSTOLIC BLOOD PRESSURE: 132 MMHG | DIASTOLIC BLOOD PRESSURE: 86 MMHG | HEIGHT: 72 IN | TEMPERATURE: 98.8 F | HEART RATE: 71 BPM

## 2019-01-14 DIAGNOSIS — R05.3 CHRONIC COUGH: Primary | ICD-10-CM

## 2019-01-14 DIAGNOSIS — K21.9 GASTROESOPHAGEAL REFLUX DISEASE, ESOPHAGITIS PRESENCE NOT SPECIFIED: ICD-10-CM

## 2019-01-14 DIAGNOSIS — J30.9 ALLERGIC RHINITIS, UNSPECIFIED SEASONALITY, UNSPECIFIED TRIGGER: ICD-10-CM

## 2019-01-14 PROCEDURE — 99213 OFFICE O/P EST LOW 20 MIN: CPT | Performed by: INTERNAL MEDICINE

## 2019-01-14 NOTE — PROGRESS NOTES
Pulmonary Medicine Follow-up    Chief Complaint     Follow-up of chronic cough with prior episodes of scant hemoptysis    History of Present Illness/History Review     Kev Sosa returns to the clinic today. He was last seen by my nurse practitioner in October. He states that he continues to have a cough however this has improved quite a bit over the past 6 months or so. He has been taking Pulmicort twice daily as well as Atrovent upwards of 4 times daily. He still has a cough and occasionally it is productive of mucus but also is occasionally flecked with black material. He denies any outright hemoptysis however. He denies any exposure to any dust or coal-burning stoves. He is sure that he has not inhaled any dark colored pigmented.    I have been able to review a CT scan of the chest which was performed on 08/23/2018. This is a grossly normal examination with no signs of nodules or infiltrates. Airways appear to be patent.    He states that his wheezing has gotten much better. He denies any recent fevers chills or sweats. He does have lower orthopedic complaints including his knees and his back. He also notes that when he takes an occasional Flexeril for his muscle spasms, he notes that the following day he is able to expectorate more sputum. He is typically not very short of breath.      Review of Systems  A full review of systems was completed and it is negative except as mentioned expressly in the HPI.    Allergies   Allergen Reactions   • Penicillins Hives       Current Outpatient Medications:   •  albuterol (PROVENTIL) (2.5 MG/3ML) 0.083% nebulizer solution, Take 2.5 mg by nebulization 4 (Four) Times a Day As Needed for Wheezing., Disp: 125 vial, Rfl: 11  •  ALPRAZolam (XANAX) 1 MG tablet, Take 1 tablet by mouth Daily., Disp: 30 tablet, Rfl: 2  •  amLODIPine (NORVASC) 5 MG tablet, Take 1 tablet by mouth Daily., Disp: 90 tablet, Rfl: 0  •  azelastine (ASTELIN) 0.1 % nasal spray, 2 sprays into each nostril 2  (Two) Times a Day., Disp: , Rfl: 11  •  cyclobenzaprine (FLEXERIL) 5 MG tablet, TAKE 1 TABLET BY MOUTH THREE TIMES DAILY AS NEEDED FOR MUSCLE SPASMS, Disp: 90 tablet, Rfl: 0  •  fexofenadine (ALLEGRA) 60 MG tablet, Take 180 mg by mouth Daily., Disp: , Rfl:   •  fluticasone (FLONASE) 50 MCG/ACT nasal spray, 2 sprays into each nostril Daily As Needed., Disp: , Rfl: 11  •  ipratropium (ATROVENT HFA) 17 MCG/ACT inhaler, Inhale 2 puffs 4 (Four) Times a Day., Disp: 12.9 g, Rfl: 5  •  ipratropium (ATROVENT) 0.06 % nasal spray, 1 spray into each nostril Daily As Needed., Disp: , Rfl:   •  losartan (COZAAR) 100 MG tablet, Take 1 tablet by mouth Daily., Disp: 90 tablet, Rfl: 0  •  meloxicam (MOBIC) 7.5 MG tablet, TAKE 1 TABLET BY MOUTH EVERY DAY, Disp: 30 tablet, Rfl: 0  •  metoprolol tartrate (LOPRESSOR) 50 MG tablet, TAKE 1 TABLET BY MOUTH 2 (TWO) TIMES A DAY., Disp: 180 tablet, Rfl: 0  •  omeprazole (priLOSEC) 20 MG capsule, TAKE 1 CAPSULE BY MOUTH EVERY DAY, Disp: 30 capsule, Rfl: 0  •  PULMICORT FLEXHALER 180 MCG/ACT inhaler, Inhale 1 puff 2 (Two) Times a Day As Needed., Disp: , Rfl: 5  •  ranitidine (ZANTAC) 150 MG tablet, Take 150 mg by mouth Every Night., Disp: , Rfl: 3  •  tamsulosin (FLOMAX) 0.4 MG capsule 24 hr capsule, Take 0.4 mg by mouth Daily., Disp: , Rfl:   •  Testosterone Cypionate (DEPO-TESTOSTERONE) 200 MG/ML injection, 2.5 mL., Disp: , Rfl:   •  valACYclovir (VALTREX) 500 MG tablet, Take 1 tablet by mouth Daily., Disp: 30 tablet, Rfl: 0  •  VENTOLIN  (90 Base) MCG/ACT inhaler, Inhale 2 puffs Every 4 (Four) Hours As Needed for Wheezing., Disp: 18 g, Rfl: 4    Past Medical History:   Diagnosis Date   • Anxiety    • Depression    • Generalized osteoarthritis    • H/O colonoscopy    • History of colon polyps     sigmoid colon   • History of diverticulosis    • History of esophageal reflux    • History of hiatal hernia    • History of irritable bowel syndrome    • History of nicotine dependence    •  "History of osteoarthritis    • Hypertension      Family History   Problem Relation Age of Onset   • Stroke Father    • Hypertension Father    • Diabetes Father    • Cancer Father    • Depression Other    • Osteoarthritis Other    • Cancer Other    • Lung cancer Other    • No Known Problems Mother    • Breast cancer Sister    • No Known Problems Maternal Grandmother    • Liver cancer Maternal Grandfather    • No Known Problems Paternal Grandmother    • No Known Problems Paternal Grandfather      Social History     Tobacco Use   • Smoking status: Former Smoker     Packs/day: 1.00     Years: 20.00     Pack years: 20.00     Last attempt to quit:      Years since quittin.0   • Smokeless tobacco: Never Used   Substance Use Topics   • Alcohol use: No     Comment: stopped drinking 1 month ago   • Drug use: No       /86   Pulse 71   Temp 98.8 °F (37.1 °C)   Ht 182.9 cm (72\")   Wt (!) 159 kg (350 lb)   SpO2 94% Comment: resting, room air  BMI 47.47 kg/m²   Physical Exam   Constitutional: He is oriented to person, place, and time. He appears well-developed and well-nourished. No distress.   Obese   HENT:   Head: Normocephalic and atraumatic.   Mouth/Throat: Oropharynx is clear and moist. No oropharyngeal exudate.   Eyes: Conjunctivae and EOM are normal. Pupils are equal, round, and reactive to light.   Neck: Normal range of motion. Neck supple. No JVD present. Carotid bruit is not present. No tracheal deviation present. No thyroid mass and no thyromegaly present.   Cardiovascular: Normal rate and regular rhythm.   No murmur heard.  Pulmonary/Chest: Effort normal and breath sounds normal. No stridor. No respiratory distress. He has no wheezes. He has no rales. He exhibits no tenderness.   Abdominal: Soft.   Musculoskeletal: Normal range of motion. He exhibits no edema or deformity.   Lymphadenopathy:        Head (right side): No submental and no submandibular adenopathy present.        Head (left side): No " submental and no submandibular adenopathy present.     He has no cervical adenopathy.        Right: No supraclavicular adenopathy present.        Left: No supraclavicular adenopathy present.   Neurological: He is alert and oriented to person, place, and time.   Skin: Skin is warm and dry. Capillary refill takes less than 2 seconds.   Psychiatric: He has a normal mood and affect. His behavior is normal. Thought content normal.   Vitals reviewed.        Problem List       ICD-10-CM ICD-9-CM   1. Chronic cough R05 786.2   2. Allergic rhinitis, unspecified seasonality, unspecified trigger J30.9 477.9   3. Gastroesophageal reflux disease, esophagitis presence not specified K21.9 530.81       Impression and Plan     I have offered bronchoscopy for airway inspection. I suspect that he could have areas of old blood from chronic bronchitis which could be appearing black on sputum although this is unclear at this point. His normal CT scan of the chest is reassuring. He feels that overall he is doing a bit better with his current inhalers though is interested in possibly trying a different one. He would like to hold on performing bronchoscopy at this time.    I would like him to continue on with his Pulmicort as before and continue to use as needed Atrovent. To this, I will add Anoro once a day to his regimen to see if this helps him feel bit better. I have given him samples of this and I have explained the usage of the medication. If he thinks that these are helping him, he will contact us and we will get him further samples and a prescription.    I will plan to follow-up with him in approximately 3 months. I have asked him to give me a call if he has any problems or worsening of symptoms in the meantime.    Emile Arteaga MD, [facility]    CC: Francesca Richardson, DO

## 2019-01-18 ENCOUNTER — OFFICE VISIT (OUTPATIENT)
Dept: INTERNAL MEDICINE | Facility: CLINIC | Age: 59
End: 2019-01-18

## 2019-01-18 VITALS
BODY MASS INDEX: 46.38 KG/M2 | DIASTOLIC BLOOD PRESSURE: 90 MMHG | OXYGEN SATURATION: 96 % | WEIGHT: 315 LBS | SYSTOLIC BLOOD PRESSURE: 120 MMHG | HEART RATE: 78 BPM

## 2019-01-18 DIAGNOSIS — R73.02 IMPAIRED GLUCOSE TOLERANCE: ICD-10-CM

## 2019-01-18 DIAGNOSIS — Z00.00 HEALTHCARE MAINTENANCE: Primary | ICD-10-CM

## 2019-01-18 LAB
BILIRUB BLD-MCNC: NEGATIVE MG/DL
CLARITY, POC: CLEAR
COLOR UR: YELLOW
GLUCOSE BLDC GLUCOMTR-MCNC: 108 MG/DL (ref 70–130)
GLUCOSE UR STRIP-MCNC: NEGATIVE MG/DL
HBA1C MFR BLD: 6.1 %
KETONES UR QL: NEGATIVE
LEUKOCYTE EST, POC: NEGATIVE
NITRITE UR-MCNC: NEGATIVE MG/ML
PH UR: 7 [PH] (ref 5–8)
PROT UR STRIP-MCNC: NEGATIVE MG/DL
RBC # UR STRIP: NEGATIVE /UL
SP GR UR: 1 (ref 1–1.03)
UROBILINOGEN UR QL: NORMAL

## 2019-01-18 PROCEDURE — 83036 HEMOGLOBIN GLYCOSYLATED A1C: CPT | Performed by: INTERNAL MEDICINE

## 2019-01-18 PROCEDURE — 81003 URINALYSIS AUTO W/O SCOPE: CPT | Performed by: INTERNAL MEDICINE

## 2019-01-18 PROCEDURE — 82962 GLUCOSE BLOOD TEST: CPT | Performed by: INTERNAL MEDICINE

## 2019-01-18 PROCEDURE — 99396 PREV VISIT EST AGE 40-64: CPT | Performed by: INTERNAL MEDICINE

## 2019-01-18 NOTE — PROGRESS NOTES
Chief Complaint   Patient presents with   • Annual Exam       Reported Health  Good Yes  FairNo  PoorNo      Dental,Vision,Hearing  Regular dental visitsYes  Vision Problemsyes  Hearing LossNo      Immunization Status:  Up To DateNo        Lifestyle  Healthy DietYes  Weight ConcernsYes  Regular ExerciseNo  Tobacco UseNo  Alcohol UseYes  Drug AbuseNo      Screening  Cancer ScreeningYes  Metabolic ScreeningYes  Risk ScreeningYes  Past Medical History:   Diagnosis Date   • Anxiety    • Depression    • Generalized osteoarthritis    • H/O colonoscopy    • History of colon polyps     sigmoid colon   • History of diverticulosis    • History of esophageal reflux    • History of hiatal hernia    • History of irritable bowel syndrome    • History of nicotine dependence    • History of osteoarthritis    • Hypertension      Past Surgical History:   Procedure Laterality Date   • CARDIOVERSION      with asdenoside for SVT   • FOOT SURGERY      club feet   • HERNIA REPAIR     • KNEE ACL RECONSTRUCTION     • KNEE ARTHROSCOPY      Right   • LAMINECTOMY      Lumbar lower back   • SHOULDER SURGERY      Right     Family History   Problem Relation Age of Onset   • Stroke Father    • Hypertension Father    • Diabetes Father    • Cancer Father    • Depression Other    • Osteoarthritis Other    • Cancer Other    • Lung cancer Other    • No Known Problems Mother    • Breast cancer Sister    • No Known Problems Maternal Grandmother    • Liver cancer Maternal Grandfather    • No Known Problems Paternal Grandmother    • No Known Problems Paternal Grandfather      Social History     Socioeconomic History   • Marital status:      Spouse name: Not on file   • Number of children: Not on file   • Years of education: Not on file   • Highest education level: Not on file   Social Needs   • Financial resource strain: Not on file   • Food insecurity - worry: Not on file   • Food insecurity - inability: Not on file   • Transportation needs -  medical: Not on file   • Transportation needs - non-medical: Not on file   Occupational History   • Occupation: Disabled   Tobacco Use   • Smoking status: Former Smoker     Packs/day: 1.00     Years: 20.00     Pack years: 20.00     Last attempt to quit:      Years since quittin.0   • Smokeless tobacco: Never Used   Substance and Sexual Activity   • Alcohol use: No     Comment: stopped drinking 1 month ago   • Drug use: No   • Sexual activity: Defer     Comment: High Risk sexual behavior/    Other Topics Concern   • Not on file   Social History Narrative    Caffeine:  0-1 servings per day    Patient lives at home with his wife.         Review of Systems   Constitutional: Negative for activity change, appetite change, chills, diaphoresis, fatigue, fever and unexpected weight change.   HENT: Negative for congestion, dental problem, drooling, ear discharge, ear pain, facial swelling, hearing loss, mouth sores, nosebleeds, postnasal drip, rhinorrhea, sinus pressure, sneezing, sore throat, tinnitus, trouble swallowing and voice change.    Eyes: Negative for photophobia, pain, discharge, itching and visual disturbance.   Respiratory: Negative for cough, choking, chest tightness, shortness of breath, wheezing and stridor.    Cardiovascular: Negative for palpitations and leg swelling.   Gastrointestinal: Negative for abdominal distention, abdominal pain, anal bleeding, blood in stool, constipation, diarrhea, nausea and vomiting.   Endocrine: Negative for cold intolerance, heat intolerance, polydipsia and polyphagia.   Genitourinary: Negative for decreased urine volume, discharge, dysuria, enuresis, flank pain, frequency, genital sores, hematuria, scrotal swelling, testicular pain and urgency.   Musculoskeletal: Negative for arthralgias, back pain, gait problem, joint swelling, myalgias, neck pain and neck stiffness.   Skin: Negative for color change, pallor, rash and wound.   Allergic/Immunologic: Negative for  environmental allergies, food allergies and immunocompromised state.   Neurological: Negative for dizziness, tremors, seizures, syncope, facial asymmetry, speech difficulty, weakness, light-headedness, numbness and headaches.   Hematological: Negative for adenopathy. Does not bruise/bleed easily.   Psychiatric/Behavioral: Negative for agitation, behavioral problems, confusion, dysphoric mood, hallucinations, sleep disturbance and suicidal ideas. The patient is not nervous/anxious and is not hyperactive.        /90   Pulse 78   Wt (!) 155 kg (342 lb)   SpO2 96%   BMI 46.38 kg/m²       Physical Exam   Constitutional: He is oriented to person, place, and time. He appears well-developed and well-nourished.   HENT:   Head: Normocephalic and atraumatic.   Right Ear: External ear normal.   Left Ear: External ear normal.   Nose: Nose normal.   Mouth/Throat: Oropharynx is clear and moist.   Eyes: Conjunctivae and EOM are normal. Pupils are equal, round, and reactive to light.   Neck: Normal range of motion. Neck supple. No JVD present. No thyromegaly present.   Cardiovascular: Normal rate, regular rhythm, normal heart sounds and intact distal pulses. Exam reveals no gallop and no friction rub.   No murmur heard.  Pulmonary/Chest: Effort normal and breath sounds normal. No respiratory distress. He has no wheezes. He has no rales. He exhibits no tenderness.   Abdominal: Soft. Bowel sounds are normal. He exhibits no distension and no mass. There is no tenderness. There is no rebound and no guarding. No hernia.   Genitourinary: Rectum normal, prostate normal and penis normal.   Lymphadenopathy:     He has no cervical adenopathy.   Neurological: He is oriented to person, place, and time. He displays normal reflexes. No cranial nerve deficit. He exhibits normal muscle tone. Coordination normal.   Skin: No rash noted. No erythema. No pallor.   Psychiatric: He has a normal mood and affect.         Diet and  Exercise    Healthy Diet Yes  Adequate DietYes  Poor DietNo  Adequate Exercise RegimenNo  Inadequate Exercise RegimenYes      Prostate Cancer Screening    Sees Urology and had prostate exam and psa level at his office 1 week ago.      Testicular Cancer screening  Risks and Benefits DiscussedYes  Self Testicular Exam taughtNo  Monthly Self Exam AdvisedYes  Screening CurrentYes  Clinical Testicular Exam DoneNo  Screening Not IndicatedNo      STD Testing  ChlamydiaNo  GonorrheaNo  HIVNo      Colorectal Cancer Screening  Has colonoscopy vera next week  Metabolic Screening  GlucoseYes  LipidsYes  CBCYes  TSHYes  UAYes  CMPYes  25OHNo      Immunizations  Risks and benefits discussedYes  Immunizations Up To DateYes  Immunizations NeededNo  Immunizations Per OrdersYes  Patient DNoeclines      Preventative Counseling  NutritionYes  Aerobic ExerciseYes  Weight Bearing ExerciseYes  Weight LossYes  Calcium SupplementsYes  Vitamin D SupplementsYes  Reproductive HealthNo  Cardiovascular Risk ReductionNo  Tobacco CessationNo  Alcohol UseYes  Sunscreen UseYes  Self Skin ExaminationNo  Helmet UseNo  Seat Belt UseYes  Fall Risk ReductionNo  Advanced Directive PlanningNo      Patient Discussion  PatientYes  Familyno  CounselingYes    Margarito was seen today for annual exam.    Diagnoses and all orders for this visit:    Healthcare maintenance  -     Cancel: CBC & Differential  -     Cancel: Comprehensive Metabolic Panel  -     Cancel: Lipid Panel  -     Cancel: TSH  -     Cancel: PSA Screen  -     POC Urinalysis Dipstick, Automated  -     CBC & Differential; Future  -     Comprehensive Metabolic Panel; Future  -     Lipid Panel; Future  -     TSH; Future

## 2019-01-28 DIAGNOSIS — G89.29 CHRONIC FOOT PAIN, UNSPECIFIED LATERALITY: ICD-10-CM

## 2019-01-28 DIAGNOSIS — M79.673 CHRONIC FOOT PAIN, UNSPECIFIED LATERALITY: ICD-10-CM

## 2019-01-28 RX ORDER — MELOXICAM 7.5 MG/1
TABLET ORAL
Qty: 90 TABLET | Refills: 0 | Status: SHIPPED | OUTPATIENT
Start: 2019-01-28 | End: 2019-04-23 | Stop reason: SDUPTHER

## 2019-02-14 DIAGNOSIS — F41.9 ANXIETY: ICD-10-CM

## 2019-02-15 RX ORDER — ALPRAZOLAM 1 MG/1
TABLET ORAL
Qty: 30 TABLET | Refills: 2 | Status: SHIPPED | OUTPATIENT
Start: 2019-02-15 | End: 2019-05-30 | Stop reason: SDUPTHER

## 2019-02-17 DIAGNOSIS — I10 HYPERTENSION, UNSPECIFIED TYPE: ICD-10-CM

## 2019-02-17 RX ORDER — AMLODIPINE BESYLATE 5 MG/1
TABLET ORAL
Qty: 90 TABLET | Refills: 0 | OUTPATIENT
Start: 2019-02-17

## 2019-02-18 ENCOUNTER — OFFICE VISIT (OUTPATIENT)
Dept: INTERNAL MEDICINE | Facility: CLINIC | Age: 59
End: 2019-02-18

## 2019-02-18 VITALS
WEIGHT: 315 LBS | SYSTOLIC BLOOD PRESSURE: 120 MMHG | DIASTOLIC BLOOD PRESSURE: 90 MMHG | HEART RATE: 81 BPM | BODY MASS INDEX: 46.46 KG/M2 | OXYGEN SATURATION: 98 %

## 2019-02-18 DIAGNOSIS — L72.9 SKIN CYST: Primary | ICD-10-CM

## 2019-02-18 PROCEDURE — 99213 OFFICE O/P EST LOW 20 MIN: CPT | Performed by: INTERNAL MEDICINE

## 2019-02-18 NOTE — PROGRESS NOTES
Subjective   Margarito Sosa is a 58 y.o. male.   Chief Complaint   Patient presents with   • Cyst on Right Arm     Acute         History of Present Illness   Cyst on right arm. Seen by Dr. Acosta office but wants second opinion.  The following portions of the patient's history were reviewed and updated as appropriate: allergies, current medications, past family history, past medical history, past social history, past surgical history and problem list.    Review of Systems   Constitutional: Negative for activity change, appetite change, chills, diaphoresis, fatigue, fever and unexpected weight change.   HENT: Negative for congestion, dental problem, drooling, ear discharge, ear pain, facial swelling, hearing loss, mouth sores, nosebleeds, postnasal drip, rhinorrhea, sinus pressure, sneezing, sore throat, tinnitus, trouble swallowing and voice change.    Eyes: Negative for photophobia, pain, discharge, itching and visual disturbance.   Respiratory: Negative for cough, choking, chest tightness, shortness of breath, wheezing and stridor.    Cardiovascular: Negative for palpitations and leg swelling.   Gastrointestinal: Negative for abdominal distention, abdominal pain, anal bleeding, blood in stool, constipation, diarrhea, nausea and vomiting.   Endocrine: Negative for cold intolerance, heat intolerance, polydipsia and polyphagia.   Genitourinary: Negative for decreased urine volume, discharge, dysuria, enuresis, flank pain, frequency, genital sores, hematuria, scrotal swelling, testicular pain and urgency.   Musculoskeletal: Negative for arthralgias, back pain, gait problem, joint swelling, myalgias, neck pain and neck stiffness.   Skin: Negative for color change, pallor, rash and wound.        Cyst on arm   Allergic/Immunologic: Negative for environmental allergies, food allergies and immunocompromised state.   Neurological: Negative for dizziness, tremors, seizures, syncope, facial asymmetry, speech difficulty,  weakness, light-headedness, numbness and headaches.   Hematological: Negative for adenopathy. Does not bruise/bleed easily.   Psychiatric/Behavioral: Negative for agitation, behavioral problems, confusion, dysphoric mood, hallucinations, sleep disturbance and suicidal ideas. The patient is not nervous/anxious and is not hyperactive.      /90   Pulse 81   Wt (!) 155 kg (342 lb 9.6 oz)   SpO2 98%   BMI 46.46 kg/m²     Objective   Physical Exam   Constitutional: He is oriented to person, place, and time. He appears well-developed and well-nourished.   HENT:   Head: Normocephalic and atraumatic.   Right Ear: External ear normal.   Left Ear: External ear normal.   Nose: Nose normal.   Mouth/Throat: Oropharynx is clear and moist.   Eyes: Conjunctivae and EOM are normal. Pupils are equal, round, and reactive to light.   Neck: Normal range of motion. Neck supple. No JVD present. No thyromegaly present.   Cardiovascular: Normal rate, regular rhythm, normal heart sounds and intact distal pulses. Exam reveals no gallop and no friction rub.   No murmur heard.  Pulmonary/Chest: Effort normal and breath sounds normal. No respiratory distress. He has no wheezes. He has no rales. He exhibits no tenderness.   Abdominal: Soft. Bowel sounds are normal. He exhibits no distension and no mass. There is no tenderness. There is no rebound and no guarding. No hernia.   Genitourinary: Rectum normal, prostate normal and penis normal.   Lymphadenopathy:     He has no cervical adenopathy.   Neurological: He is oriented to person, place, and time. He displays normal reflexes. No cranial nerve deficit. He exhibits normal muscle tone. Coordination normal.   Skin: No rash noted. No erythema. No pallor.        Psychiatric: He has a normal mood and affect.       Assessment/Plan   Margarito was seen today for cyst on right arm.    Diagnoses and all orders for this visit:    Skin cyst  -     Ambulatory Referral to Dermatology

## 2019-02-19 DIAGNOSIS — I10 ESSENTIAL HYPERTENSION: Primary | ICD-10-CM

## 2019-02-19 RX ORDER — METOPROLOL TARTRATE 50 MG/1
50 TABLET, FILM COATED ORAL 2 TIMES DAILY
Qty: 180 TABLET | Refills: 0 | Status: SHIPPED | OUTPATIENT
Start: 2019-02-19 | End: 2019-05-12 | Stop reason: SDUPTHER

## 2019-02-20 RX ORDER — LOSARTAN POTASSIUM 100 MG/1
TABLET ORAL
Qty: 90 TABLET | Refills: 0 | Status: SHIPPED | OUTPATIENT
Start: 2019-02-20 | End: 2019-05-15 | Stop reason: SDUPTHER

## 2019-03-20 RX ORDER — CYCLOBENZAPRINE HCL 5 MG
TABLET ORAL
Qty: 90 TABLET | Refills: 0 | Status: SHIPPED | OUTPATIENT
Start: 2019-03-20 | End: 2019-04-23 | Stop reason: SDUPTHER

## 2019-04-23 DIAGNOSIS — M79.673 CHRONIC FOOT PAIN, UNSPECIFIED LATERALITY: ICD-10-CM

## 2019-04-23 DIAGNOSIS — G89.29 CHRONIC FOOT PAIN, UNSPECIFIED LATERALITY: ICD-10-CM

## 2019-04-23 RX ORDER — CYCLOBENZAPRINE HCL 5 MG
TABLET ORAL
Qty: 90 TABLET | Refills: 0 | Status: SHIPPED | OUTPATIENT
Start: 2019-04-23 | End: 2019-07-12 | Stop reason: SDUPTHER

## 2019-04-23 RX ORDER — MELOXICAM 7.5 MG/1
TABLET ORAL
Qty: 90 TABLET | Refills: 0 | Status: SHIPPED | OUTPATIENT
Start: 2019-04-23 | End: 2019-05-06

## 2019-05-06 ENCOUNTER — OFFICE VISIT (OUTPATIENT)
Dept: INTERNAL MEDICINE | Facility: CLINIC | Age: 59
End: 2019-05-06

## 2019-05-06 VITALS
WEIGHT: 315 LBS | OXYGEN SATURATION: 96 % | SYSTOLIC BLOOD PRESSURE: 110 MMHG | HEART RATE: 75 BPM | DIASTOLIC BLOOD PRESSURE: 80 MMHG | BODY MASS INDEX: 45.52 KG/M2

## 2019-05-06 DIAGNOSIS — F32.9 REACTIVE DEPRESSION: ICD-10-CM

## 2019-05-06 DIAGNOSIS — E78.5 DYSLIPIDEMIA: ICD-10-CM

## 2019-05-06 DIAGNOSIS — I10 ESSENTIAL HYPERTENSION: ICD-10-CM

## 2019-05-06 DIAGNOSIS — F41.9 ANXIETY: ICD-10-CM

## 2019-05-06 DIAGNOSIS — R73.02 IMPAIRED GLUCOSE TOLERANCE: Primary | ICD-10-CM

## 2019-05-06 DIAGNOSIS — K21.00 GASTROESOPHAGEAL REFLUX DISEASE WITH ESOPHAGITIS: ICD-10-CM

## 2019-05-06 PROBLEM — D12.6 TUBULAR ADENOMA OF COLON: Status: ACTIVE | Noted: 2019-05-06

## 2019-05-06 LAB
GLUCOSE BLDC GLUCOMTR-MCNC: 110 MG/DL (ref 70–130)
HBA1C MFR BLD: 6 %

## 2019-05-06 PROCEDURE — 83036 HEMOGLOBIN GLYCOSYLATED A1C: CPT | Performed by: INTERNAL MEDICINE

## 2019-05-06 PROCEDURE — 99213 OFFICE O/P EST LOW 20 MIN: CPT | Performed by: INTERNAL MEDICINE

## 2019-05-06 PROCEDURE — 82962 GLUCOSE BLOOD TEST: CPT | Performed by: INTERNAL MEDICINE

## 2019-05-06 NOTE — PROGRESS NOTES
Subjective   Margarito Sosa is a 58 y.o. male.     Heartburn   He reports no abdominal pain, no chest pain, no coughing, no nausea, no sore throat or no wheezing. This is a chronic problem. The current episode started more than 1 year ago. Pertinent negatives include no fatigue.   Hypertension   This is a chronic problem. The current episode started more than 1 year ago. Associated symptoms include anxiety. Pertinent negatives include no chest pain, headaches, neck pain, palpitations or shortness of breath. Compliance problems include diet and exercise.    Depression   Visit Type: follow-up  Patient is not experiencing: confusion, decreased concentration, nervousness/anxiety, palpitations, shortness of breath, suicidal planning, thoughts of death and weight gain.    Anxiety   Patient reports no chest pain, confusion, decreased concentration, nausea, nervous/anxious behavior, palpitations or shortness of breath.     His past medical history is significant for depression.      I lipids for years. Needs lipid panel Reflux is stable  The following portions of the patient's history were reviewed and updated as appropriate: allergies, current medications, past family history, past medical history, past social history, past surgical history and problem list.    Review of Systems   Constitutional: Negative for activity change, appetite change, chills, diaphoresis, fatigue, fever, unexpected weight change and weight gain.   HENT: Negative for congestion, ear discharge, ear pain, mouth sores, nosebleeds, sinus pressure, sneezing and sore throat.    Eyes: Negative for pain, discharge and itching.   Respiratory: Negative for cough, chest tightness, shortness of breath and wheezing.    Cardiovascular: Negative for chest pain, palpitations and leg swelling.   Gastrointestinal: Negative for abdominal pain, constipation, diarrhea, nausea and vomiting.        Reflux with occasional wheeze   Endocrine: Negative for cold  intolerance, heat intolerance, polydipsia and polyphagia.   Genitourinary: Negative for dysuria, flank pain, frequency, hematuria and urgency.   Musculoskeletal: Negative for arthralgias, back pain, gait problem, myalgias, neck pain and neck stiffness.        Chronic back pain     Skin: Negative for color change, pallor and rash.   Neurological: Negative for seizures, speech difficulty, numbness and headaches.   Psychiatric/Behavioral: Negative for agitation, confusion, decreased concentration and sleep disturbance. The patient is not nervous/anxious.      /80   Pulse 75   Wt (!) 152 kg (335 lb 9.6 oz)   SpO2 96%   BMI 45.52 kg/m²     Objective   Physical Exam   Constitutional: He appears well-developed.   HENT:   Head: Normocephalic.   Right Ear: External ear normal.   Left Ear: External ear normal.   Nose: Nose normal.   Mouth/Throat: Oropharynx is clear and moist.   Eyes: Conjunctivae are normal. Pupils are equal, round, and reactive to light.   Neck: No JVD present. No thyromegaly present.   Cardiovascular: Normal rate, regular rhythm and normal heart sounds. Exam reveals no friction rub.   No murmur heard.  Pulmonary/Chest: Effort normal and breath sounds normal. No respiratory distress. He has no wheezes. He has no rales.   Abdominal: Soft. Bowel sounds are normal. He exhibits no distension. There is no tenderness. There is no guarding.   Musculoskeletal: He exhibits no edema or tenderness.   Lymphadenopathy:     He has no cervical adenopathy.   Neurological: He displays normal reflexes. No cranial nerve deficit.   Skin: No rash noted.   Psychiatric: His behavior is normal.   Nursing note and vitals reviewed.      Assessment/Plan   Margarito was seen today for hypertension, heartburn, depression and prediabetes.    Diagnoses and all orders for this visit:    Impaired glucose tolerance  -     POC Glucose Fingerstick  -     POC Glycosylated Hemoglobin (Hb A1C)    Essential hypertension  -     Comprehensive  Metabolic Panel  -     Lipid Panel  Added norvasc 5 mg po qd  Gastroesophageal reflux disease, esophagitis presence not specified  EGD  Dyslipidemia  -     Comprehensive Metabolic Panel  -     Lipid Panel    Other depression  stable  Anxiety  Has been on xanax x years and unable to wean.      Dyslipidemia  Lipids andcmp        The patient has read and signed the [facility] [employer] Substance Contract.  I will continue to see patient for regular follow up appointments.  They are well controlled on their medication.  GENEVA is updated every 3 months. The patient is aware of the potential for addiction and dependence.    The patient has read and signed the [facility] [employer] Substance Contract.  I will continue to see patient for regular follow up appointments.  They are well controlled on their medication.  GENEVA is updated every 3 months. The patient is aware of the potential for addiction. The patient has read and signed the [facility] [employer] [facility] Substance Contract.  I will continue to see patient for regular follow up appointments.  They are well controlled on their medication.  GENEVA is updated every 3 months. The patient is aware of the potential for addiction and dependence.on and dependence.

## 2019-05-07 ENCOUNTER — LAB (OUTPATIENT)
Dept: INTERNAL MEDICINE | Facility: CLINIC | Age: 59
End: 2019-05-07

## 2019-05-07 DIAGNOSIS — Z00.00 HEALTHCARE MAINTENANCE: ICD-10-CM

## 2019-05-07 LAB
ALBUMIN SERPL-MCNC: 4.6 G/DL (ref 3.5–5.2)
ALBUMIN/GLOB SERPL: 1.8 G/DL
ALP SERPL-CCNC: 53 U/L (ref 39–117)
ALT SERPL W P-5'-P-CCNC: 25 U/L (ref 1–41)
ANION GAP SERPL CALCULATED.3IONS-SCNC: 11.3 MMOL/L
AST SERPL-CCNC: 24 U/L (ref 1–40)
BASOPHILS # BLD AUTO: 0.02 10*3/MM3 (ref 0–0.2)
BASOPHILS NFR BLD AUTO: 0.3 % (ref 0–1.5)
BILIRUB SERPL-MCNC: 0.6 MG/DL (ref 0.2–1.2)
BUN BLD-MCNC: 10 MG/DL (ref 6–20)
BUN/CREAT SERPL: 9.3 (ref 7–25)
CALCIUM SPEC-SCNC: 8.8 MG/DL (ref 8.6–10.5)
CHLORIDE SERPL-SCNC: 100 MMOL/L (ref 98–107)
CHOLEST SERPL-MCNC: 139 MG/DL (ref 0–200)
CO2 SERPL-SCNC: 29.7 MMOL/L (ref 22–29)
CREAT BLD-MCNC: 1.08 MG/DL (ref 0.76–1.27)
DEPRECATED RDW RBC AUTO: 45.3 FL (ref 37–54)
EOSINOPHIL # BLD AUTO: 0.15 10*3/MM3 (ref 0–0.4)
EOSINOPHIL NFR BLD AUTO: 2.5 % (ref 0.3–6.2)
ERYTHROCYTE [DISTWIDTH] IN BLOOD BY AUTOMATED COUNT: 14.2 % (ref 12.3–15.4)
GFR SERPL CREATININE-BSD FRML MDRD: 71 ML/MIN/1.73
GLOBULIN UR ELPH-MCNC: 2.5 GM/DL
GLUCOSE BLD-MCNC: 99 MG/DL (ref 65–99)
HCT VFR BLD AUTO: 52.2 % (ref 37.5–51)
HDLC SERPL-MCNC: 27 MG/DL (ref 40–60)
HGB BLD-MCNC: 16.7 G/DL (ref 13–17.7)
IMM GRANULOCYTES # BLD AUTO: 0.01 10*3/MM3 (ref 0–0.05)
IMM GRANULOCYTES NFR BLD AUTO: 0.2 % (ref 0–0.5)
LDLC SERPL CALC-MCNC: 73 MG/DL (ref 0–100)
LDLC/HDLC SERPL: 2.7 {RATIO}
LYMPHOCYTES # BLD AUTO: 1.59 10*3/MM3 (ref 0.7–3.1)
LYMPHOCYTES NFR BLD AUTO: 26.5 % (ref 19.6–45.3)
MCH RBC QN AUTO: 28.1 PG (ref 26.6–33)
MCHC RBC AUTO-ENTMCNC: 32 G/DL (ref 31.5–35.7)
MCV RBC AUTO: 87.7 FL (ref 79–97)
MONOCYTES # BLD AUTO: 0.81 10*3/MM3 (ref 0.1–0.9)
MONOCYTES NFR BLD AUTO: 13.5 % (ref 5–12)
NEUTROPHILS # BLD AUTO: 3.41 10*3/MM3 (ref 1.7–7)
NEUTROPHILS NFR BLD AUTO: 57 % (ref 42.7–76)
NRBC BLD AUTO-RTO: 0 /100 WBC (ref 0–0.2)
PLATELET # BLD AUTO: 199 10*3/MM3 (ref 140–450)
PMV BLD AUTO: 10.3 FL (ref 6–12)
POTASSIUM BLD-SCNC: 4.5 MMOL/L (ref 3.5–5.2)
PROT SERPL-MCNC: 7.1 G/DL (ref 6–8.5)
RBC # BLD AUTO: 5.95 10*6/MM3 (ref 4.14–5.8)
SODIUM BLD-SCNC: 141 MMOL/L (ref 136–145)
TRIGL SERPL-MCNC: 196 MG/DL (ref 0–150)
TSH SERPL DL<=0.05 MIU/L-ACNC: 4.25 MIU/ML (ref 0.27–4.2)
VLDLC SERPL-MCNC: 39.2 MG/DL (ref 5–40)
WBC NRBC COR # BLD: 5.99 10*3/MM3 (ref 3.4–10.8)

## 2019-05-07 PROCEDURE — 80053 COMPREHEN METABOLIC PANEL: CPT | Performed by: INTERNAL MEDICINE

## 2019-05-07 PROCEDURE — 80061 LIPID PANEL: CPT | Performed by: INTERNAL MEDICINE

## 2019-05-07 PROCEDURE — 84443 ASSAY THYROID STIM HORMONE: CPT | Performed by: INTERNAL MEDICINE

## 2019-05-07 PROCEDURE — 85025 COMPLETE CBC W/AUTO DIFF WBC: CPT | Performed by: INTERNAL MEDICINE

## 2019-05-12 DIAGNOSIS — I10 ESSENTIAL HYPERTENSION: ICD-10-CM

## 2019-05-12 RX ORDER — METOPROLOL TARTRATE 50 MG/1
TABLET, FILM COATED ORAL
Qty: 180 TABLET | Refills: 2 | Status: SHIPPED | OUTPATIENT
Start: 2019-05-12

## 2019-05-15 RX ORDER — LOSARTAN POTASSIUM 100 MG/1
TABLET ORAL
Qty: 90 TABLET | Refills: 0 | Status: SHIPPED | OUTPATIENT
Start: 2019-05-15 | End: 2019-08-11 | Stop reason: SDUPTHER

## 2019-05-21 DIAGNOSIS — R79.89 ELEVATED TSH: Primary | ICD-10-CM

## 2019-05-24 DIAGNOSIS — I10 HYPERTENSION, UNSPECIFIED TYPE: ICD-10-CM

## 2019-05-24 RX ORDER — AMLODIPINE BESYLATE 5 MG/1
TABLET ORAL
Qty: 90 TABLET | Refills: 0 | Status: SHIPPED | OUTPATIENT
Start: 2019-05-24 | End: 2019-08-23 | Stop reason: SDUPTHER

## 2019-05-30 DIAGNOSIS — F41.9 ANXIETY: ICD-10-CM

## 2019-05-31 RX ORDER — ALPRAZOLAM 1 MG/1
TABLET ORAL
Qty: 30 TABLET | Refills: 2 | Status: SHIPPED | OUTPATIENT
Start: 2019-05-31

## 2019-06-24 ENCOUNTER — OFFICE VISIT (OUTPATIENT)
Dept: INTERNAL MEDICINE | Facility: CLINIC | Age: 59
End: 2019-06-24

## 2019-06-24 VITALS
OXYGEN SATURATION: 98 % | HEART RATE: 60 BPM | WEIGHT: 315 LBS | DIASTOLIC BLOOD PRESSURE: 100 MMHG | SYSTOLIC BLOOD PRESSURE: 130 MMHG | BODY MASS INDEX: 45.03 KG/M2

## 2019-06-24 DIAGNOSIS — K59.09 OTHER CONSTIPATION: Primary | ICD-10-CM

## 2019-06-24 PROCEDURE — 99213 OFFICE O/P EST LOW 20 MIN: CPT | Performed by: INTERNAL MEDICINE

## 2019-06-24 RX ORDER — KETOCONAZOLE 20 MG/ML
SHAMPOO TOPICAL
Refills: 11 | COMMUNITY
Start: 2019-04-25

## 2019-06-24 NOTE — PROGRESS NOTES
Subjective   Margarito Sosa is a 58 y.o. male.   Chief Complaint   Patient presents with   • Constipation     Acute       History of Present Illness   Last BM few days ago. Had colonoscopy a few months ago. No blood in stools. 5 polys= tubular adenoma, repeat colonoscopy in 3 years  The following portions of the patient's history were reviewed and updated as appropriate: allergies, current medications, past family history, past medical history, past social history, past surgical history and problem list.    Review of Systems   Constitutional: Negative for activity change, appetite change, chills, diaphoresis, fatigue, fever and unexpected weight change.   HENT: Negative for congestion, ear discharge, ear pain, mouth sores, nosebleeds, sinus pressure, sneezing and sore throat.    Eyes: Negative for pain, discharge and itching.   Respiratory: Negative for cough, chest tightness, shortness of breath and wheezing.    Cardiovascular: Negative for chest pain, palpitations and leg swelling.   Gastrointestinal: Positive for constipation. Negative for abdominal pain, diarrhea, nausea and vomiting.   Endocrine: Negative for cold intolerance, heat intolerance, polydipsia and polyphagia.   Genitourinary: Negative for dysuria, flank pain, frequency, hematuria and urgency.   Musculoskeletal: Negative for arthralgias, back pain, gait problem, myalgias, neck pain and neck stiffness.   Skin: Negative for color change, pallor and rash.   Neurological: Negative for seizures, speech difficulty, numbness and headaches.   Psychiatric/Behavioral: Negative for agitation, confusion, decreased concentration and sleep disturbance. The patient is not nervous/anxious.    /100   Pulse 60   Wt (!) 151 kg (332 lb)   SpO2 98%   BMI 45.03 kg/m²       Objective   Physical Exam   Constitutional: He is oriented to person, place, and time. He appears well-developed and well-nourished.   HENT:   Head: Normocephalic and atraumatic.   Right  Ear: External ear normal.   Left Ear: External ear normal.   Nose: Nose normal.   Mouth/Throat: Oropharynx is clear and moist.   Eyes: Conjunctivae and EOM are normal. Pupils are equal, round, and reactive to light.   Neck: Normal range of motion. Neck supple. No JVD present. No thyromegaly present.   Cardiovascular: Normal rate, regular rhythm, normal heart sounds and intact distal pulses. Exam reveals no gallop and no friction rub.   No murmur heard.  Pulmonary/Chest: Effort normal and breath sounds normal. No respiratory distress. He has no wheezes. He has no rales. He exhibits no tenderness.   Abdominal: Soft. Bowel sounds are normal. He exhibits no distension and no mass. There is no tenderness. There is no rebound and no guarding. No hernia.   Genitourinary: Rectum normal, prostate normal and penis normal.   Lymphadenopathy:     He has no cervical adenopathy.   Neurological: He is alert and oriented to person, place, and time. He displays normal reflexes. No cranial nerve deficit. He exhibits normal muscle tone. Coordination normal.   Skin: No rash noted. No erythema. No pallor.   Psychiatric: He has a normal mood and affect.       Assessment/Plan   Margarito was seen today for constipation.    Diagnoses and all orders for this visit:    Other constipation    Increase fiber , miralax. 3 weeks  f/u

## 2019-07-12 RX ORDER — CYCLOBENZAPRINE HCL 5 MG
TABLET ORAL
Qty: 90 TABLET | Refills: 0 | Status: SHIPPED | OUTPATIENT
Start: 2019-07-12

## 2019-07-16 ENCOUNTER — OFFICE VISIT (OUTPATIENT)
Dept: INTERNAL MEDICINE | Facility: CLINIC | Age: 59
End: 2019-07-16

## 2019-07-16 VITALS
WEIGHT: 315 LBS | SYSTOLIC BLOOD PRESSURE: 100 MMHG | OXYGEN SATURATION: 97 % | DIASTOLIC BLOOD PRESSURE: 80 MMHG | BODY MASS INDEX: 44.4 KG/M2 | HEART RATE: 52 BPM

## 2019-07-16 DIAGNOSIS — K59.09 OTHER CONSTIPATION: ICD-10-CM

## 2019-07-16 DIAGNOSIS — L30.8 OTHER ECZEMA: Primary | ICD-10-CM

## 2019-07-16 PROCEDURE — 99213 OFFICE O/P EST LOW 20 MIN: CPT | Performed by: INTERNAL MEDICINE

## 2019-07-16 RX ORDER — CLOTRIMAZOLE 1 %
CREAM (GRAM) TOPICAL
Qty: 14 G | Refills: 1 | Status: SHIPPED | OUTPATIENT
Start: 2019-07-16

## 2019-07-16 NOTE — PROGRESS NOTES
Subjective   Margarito Sosa is a 59 y.o. male.   Chief Complaint   Patient presents with   • Constipation     Follow Up       History of Present Illness   Here for f/u on constipation. Completely resolved after use of Miralax.  Has eczema and uses topical as needed.     At visit continues with inappropriate questions to Me.( who am I dating, will I go out with him, what pools do I go, etc). Discussed not appropriate but still continues.   The following portions of the patient's history were reviewed and updated as appropriate: allergies, current medications, past family history, past medical history, past social history, past surgical history and problem list.    Review of Systems   Constitutional: Negative for activity change, appetite change, chills, diaphoresis, fatigue, fever and unexpected weight change.   HENT: Negative for congestion, ear discharge, ear pain, mouth sores, nosebleeds, sinus pressure, sneezing and sore throat.    Eyes: Negative for pain, discharge and itching.   Respiratory: Negative for cough, chest tightness, shortness of breath and wheezing.    Cardiovascular: Negative for chest pain, palpitations and leg swelling.   Gastrointestinal: Negative for abdominal pain, constipation, diarrhea, nausea and vomiting.   Endocrine: Negative for cold intolerance, heat intolerance, polydipsia and polyphagia.   Genitourinary: Negative for dysuria, flank pain, frequency, hematuria and urgency.   Musculoskeletal: Negative for arthralgias, back pain, gait problem, myalgias, neck pain and neck stiffness.   Skin: Negative for color change, pallor and rash.   Neurological: Negative for seizures, speech difficulty, numbness and headaches.   Psychiatric/Behavioral: Negative for agitation, confusion, decreased concentration and sleep disturbance. The patient is not nervous/anxious.    /80   Pulse 52   Wt (!) 149 kg (327 lb 6.4 oz)   SpO2 97%   BMI 44.40 kg/m²     Objective   Physical Exam    Constitutional: He is oriented to person, place, and time. He appears well-developed and well-nourished.   HENT:   Head: Normocephalic and atraumatic.   Right Ear: External ear normal.   Left Ear: External ear normal.   Nose: Nose normal.   Mouth/Throat: Oropharynx is clear and moist.   Eyes: Conjunctivae and EOM are normal. Pupils are equal, round, and reactive to light.   Neck: Normal range of motion. Neck supple. No JVD present. No thyromegaly present.   Cardiovascular: Normal rate, regular rhythm, normal heart sounds and intact distal pulses. Exam reveals no gallop and no friction rub.   No murmur heard.  Pulmonary/Chest: Effort normal and breath sounds normal. No respiratory distress. He has no wheezes. He has no rales. He exhibits no tenderness.   Abdominal: Soft. Bowel sounds are normal. He exhibits no distension and no mass. There is no tenderness. There is no rebound and no guarding. No hernia.   Genitourinary: Rectum normal, prostate normal and penis normal.   Lymphadenopathy:     He has no cervical adenopathy.   Neurological: He is oriented to person, place, and time. He displays normal reflexes. No cranial nerve deficit. He exhibits normal muscle tone. Coordination normal.   Skin: No rash noted. No erythema. No pallor.   Psychiatric: He has a normal mood and affect.       Assessment/Plan   Margarito was seen today for constipation.    Diagnoses and all orders for this visit:    Other eczema  -     clotrimazole (LOTRIMIN) 1 % cream; Apply bid prn rash    Other constipation  Has resolved      Inappropriate questions continue. See above

## 2019-07-22 DIAGNOSIS — G89.29 CHRONIC FOOT PAIN, UNSPECIFIED LATERALITY: ICD-10-CM

## 2019-07-22 DIAGNOSIS — M79.673 CHRONIC FOOT PAIN, UNSPECIFIED LATERALITY: ICD-10-CM

## 2019-07-22 RX ORDER — MELOXICAM 7.5 MG/1
TABLET ORAL
Qty: 90 TABLET | Refills: 0 | Status: SHIPPED | OUTPATIENT
Start: 2019-07-22 | End: 2020-10-12

## 2019-08-01 ENCOUNTER — TELEPHONE (OUTPATIENT)
Dept: INTERNAL MEDICINE | Facility: CLINIC | Age: 59
End: 2019-08-01

## 2019-08-01 NOTE — TELEPHONE ENCOUNTER
PATIENT CALLED AND WANTED TO KNOW HE IS BEING DISMISSED FROM THE PRACTICE.  I TOLD HIM DUE TO INAPPROPRIATE BEHAVIOR AND DR CAI WAS NOT COMFORTABLE TREATING HIM ANYMORE.  PATIENT UNDERSTOOD.

## 2019-08-11 RX ORDER — LOSARTAN POTASSIUM 100 MG/1
TABLET ORAL
Qty: 90 TABLET | Refills: 0 | Status: SHIPPED | OUTPATIENT
Start: 2019-08-11 | End: 2020-10-12

## 2019-08-23 DIAGNOSIS — I10 HYPERTENSION, UNSPECIFIED TYPE: ICD-10-CM

## 2019-08-23 RX ORDER — AMLODIPINE BESYLATE 5 MG/1
TABLET ORAL
Qty: 30 TABLET | Refills: 0 | Status: SHIPPED | OUTPATIENT
Start: 2019-08-23 | End: 2020-10-12

## 2019-11-07 ENCOUNTER — OFFICE VISIT (OUTPATIENT)
Dept: PULMONOLOGY | Facility: CLINIC | Age: 59
End: 2019-11-07

## 2019-11-07 VITALS
OXYGEN SATURATION: 95 % | HEART RATE: 82 BPM | SYSTOLIC BLOOD PRESSURE: 132 MMHG | BODY MASS INDEX: 45.99 KG/M2 | WEIGHT: 315 LBS | RESPIRATION RATE: 18 BRPM | TEMPERATURE: 98.2 F | DIASTOLIC BLOOD PRESSURE: 100 MMHG

## 2019-11-07 DIAGNOSIS — G47.33 OSA (OBSTRUCTIVE SLEEP APNEA): ICD-10-CM

## 2019-11-07 DIAGNOSIS — R06.02 SHORTNESS OF BREATH: Primary | ICD-10-CM

## 2019-11-07 DIAGNOSIS — R06.83 SNORING: ICD-10-CM

## 2019-11-07 DIAGNOSIS — K21.9 GASTROESOPHAGEAL REFLUX DISEASE, ESOPHAGITIS PRESENCE NOT SPECIFIED: ICD-10-CM

## 2019-11-07 PROCEDURE — 99214 OFFICE O/P EST MOD 30 MIN: CPT | Performed by: NURSE PRACTITIONER

## 2019-11-07 RX ORDER — ALBUTEROL SULFATE 90 UG/1
2 AEROSOL, METERED RESPIRATORY (INHALATION) EVERY 4 HOURS PRN
Qty: 18 G | Refills: 4 | Status: SHIPPED | OUTPATIENT
Start: 2019-11-07 | End: 2019-11-08

## 2019-11-07 RX ORDER — NYSTATIN 100000 [USP'U]/G
POWDER TOPICAL AS NEEDED
COMMUNITY
End: 2020-10-12

## 2019-11-08 ENCOUNTER — TELEPHONE (OUTPATIENT)
Dept: PULMONOLOGY | Facility: CLINIC | Age: 59
End: 2019-11-08

## 2019-11-08 RX ORDER — ALBUTEROL SULFATE 90 UG/1
2 AEROSOL, METERED RESPIRATORY (INHALATION) EVERY 4 HOURS PRN
Qty: 1 INHALER | Refills: 3 | Status: SHIPPED | OUTPATIENT
Start: 2019-11-08 | End: 2020-05-28 | Stop reason: SDUPTHER

## 2019-11-08 NOTE — PROGRESS NOTES
[facility] Pulmonary Follow up    CHIEF COMPLAINT    Daytime somnolence    HISTORY OF PRESENT ILLNESS    Margarito Sosa is a 59 y.o.male here today for follow-up of his daytime somnolence and fatigue.  He was last seen in the office in January by Dr. Arteaga.  He denies any respiratory illnesses since his last appointment.  He states for the most part he is done pretty well.    He remains on Atrovent up to 4 times per day and uses his pro-air for shortness of breath approximately 4 times per day.  He states that he does have shortness of breath with any activity but states that he is less active than he used to be.  He does recover quickly at rest.    He denies fever, chills, sputum production, hemoptysis, night sweats, weight loss, chest pain or palpitations.  He denies any lower extremity edema or calf tenderness.  He denies any sinus or allergy symptoms.  He denies reflux symptoms.He does take omeprazole daily.    He does have daytime somnolence and fatigue.  He also has loud snoring.  He does not feel well rested in the mornings.  He has never had a sleep study performed.  He is interested in having an oral appliance fitted if he does have obstructive sleep apnea.    He has a remote history of smoking and quit in 2012 and had a 20-pack-year history.    He is up-to-date on his vaccinations.    Patient Active Problem List   Diagnosis   • Gastroesophageal reflux disease   • Anxiety   • Dyslipidemia   • Essential hypertension   • Impaired glucose tolerance   • Cutaneous candidiasis   • Reactive depression   • Elevated LFTs   • Excessive sweating   • Genital HSV   • High risk sexual behavior   • Joint pain, knee   • Muscle cramps   • Onychomycosis of toenail   • Pain, wrist joint   • Seborrheic dermatitis of scalp   • Tinea pedis   • SVT (supraventricular tachycardia) (CMS/HCC)   • Class 3 obesity in adult   • Snoring   • Tubular adenoma of colon       Allergies   Allergen Reactions   • Penicillins Hives        Current Outpatient Medications:   •  albuterol (PROVENTIL) (2.5 MG/3ML) 0.083% nebulizer solution, Take 2.5 mg by nebulization 4 (Four) Times a Day As Needed for Wheezing., Disp: 125 vial, Rfl: 11  •  ALPRAZolam (XANAX) 1 MG tablet, TAKE 1 TABLET BY MOUTH EVERY DAY, Disp: 30 tablet, Rfl: 2  •  amLODIPine (NORVASC) 5 MG tablet, TAKE 1 TABLET BY MOUTH EVERY DAY, Disp: 30 tablet, Rfl: 0  •  azelastine (ASTELIN) 0.1 % nasal spray, 2 sprays into each nostril 2 (Two) Times a Day., Disp: , Rfl: 11  •  clotrimazole (LOTRIMIN) 1 % cream, Apply bid prn rash, Disp: 14 g, Rfl: 1  •  cyclobenzaprine (FLEXERIL) 5 MG tablet, TAKE 1 TABLET BY MOUTH THREE TIMES DAILY AS NEEDED FOR MUSCLE SPASMS, Disp: 90 tablet, Rfl: 0  •  fexofenadine (ALLEGRA) 60 MG tablet, Take 180 mg by mouth Daily., Disp: , Rfl:   •  fluticasone (FLONASE) 50 MCG/ACT nasal spray, 2 sprays into each nostril Daily As Needed., Disp: , Rfl: 11  •  ipratropium (ATROVENT HFA) 17 MCG/ACT inhaler, Inhale 2 puffs 4 (Four) Times a Day., Disp: 12.9 g, Rfl: 5  •  ipratropium (ATROVENT) 0.06 % nasal spray, 1 spray into each nostril Daily As Needed., Disp: , Rfl:   •  ketoconazole (NIZORAL) 2 % shampoo, APPLY 1 APPLICATION ON THE SKIN EVERY OTHER DAY, Disp: , Rfl: 11  •  losartan (COZAAR) 100 MG tablet, TAKE 1 TABLET BY MOUTH EVERY DAY, Disp: 90 tablet, Rfl: 0  •  meloxicam (MOBIC) 7.5 MG tablet, TAKE 1 TABLET BY MOUTH EVERY DAY, Disp: 90 tablet, Rfl: 0  •  metoprolol tartrate (LOPRESSOR) 50 MG tablet, TAKE 1 TABLET BY MOUTH TWICE A DAY, Disp: 180 tablet, Rfl: 2  •  nystatin (MYCOSTATIN) 389369 UNIT/GM powder, nystatin 100,000 unit/gram topical powder  APPLY TO THE AFFECTED AREA(S) BY TOPICAL ROUTE 2 TIMES PER DAY, Disp: , Rfl:   •  omeprazole (priLOSEC) 20 MG capsule, TAKE 1 CAPSULE BY MOUTH EVERY DAY, Disp: 30 capsule, Rfl: 0  •  PULMICORT FLEXHALER 180 MCG/ACT inhaler, Inhale 1 puff 2 (Two) Times a Day As Needed., Disp: , Rfl: 5  •  ranitidine (ZANTAC) 150 MG tablet,  Take 150 mg by mouth Every Night., Disp: , Rfl: 3  •  tamsulosin (FLOMAX) 0.4 MG capsule 24 hr capsule, Take 0.4 mg by mouth Daily., Disp: , Rfl:   •  valACYclovir (VALTREX) 500 MG tablet, Take 1 tablet by mouth Daily., Disp: 30 tablet, Rfl: 0  •  VENTOLIN  (90 Base) MCG/ACT inhaler, Inhale 2 puffs Every 4 (Four) Hours As Needed for Wheezing., Disp: 18 g, Rfl: 4  MEDICATION LIST AND ALLERGIES REVIEWED.    Social History     Tobacco Use   • Smoking status: Former Smoker     Packs/day: 1.00     Years: 20.00     Pack years: 20.00     Last attempt to quit:      Years since quittin.8   • Smokeless tobacco: Never Used   Substance Use Topics   • Alcohol use: No     Comment: stopped drinking 1 month ago   • Drug use: No       FAMILY AND SOCIAL HISTORY REVIEWED.    Review of Systems   Constitutional: Positive for activity change and fatigue. Negative for appetite change, fever and unexpected weight change.   HENT: Negative for congestion, postnasal drip, rhinorrhea, sinus pressure, sore throat and voice change.    Eyes: Negative for visual disturbance.   Respiratory: Positive for shortness of breath. Negative for cough, chest tightness and wheezing.    Cardiovascular: Negative for chest pain, palpitations and leg swelling.   Gastrointestinal: Negative for abdominal distention, abdominal pain, nausea and vomiting.   Endocrine: Negative for cold intolerance and heat intolerance.   Genitourinary: Negative for difficulty urinating and urgency.   Musculoskeletal: Negative for arthralgias, back pain and neck pain.   Skin: Negative for color change and pallor.   Allergic/Immunologic: Negative for environmental allergies and food allergies.   Neurological: Negative for dizziness, syncope, weakness and light-headedness.   Hematological: Negative for adenopathy. Does not bruise/bleed easily.   Psychiatric/Behavioral: Negative for agitation and behavioral problems.   .    /100 (BP Location: Left arm, Patient  Position: Sitting, Cuff Size: Adult)   Pulse 82   Temp 98.2 °F (36.8 °C)   Resp 18   Wt (!) 154 kg (339 lb 2 oz)   SpO2 95% Comment: room air at rest  BMI 45.99 kg/m²     Immunization History   Administered Date(s) Administered   • Flu Mist 11/25/2015   • Flu Vaccine Quad PF >18YRS 10/23/2017   • Hepatitis A 05/31/2018, 11/30/2018   • MMR 07/14/1982   • Tdap 09/18/2012   • Varicella 07/14/1982   • Zostavax 06/04/2018   • flucelvax quad pfs =>4 YRS 10/15/2018   • influenza Split 11/04/2016       Physical Exam   Constitutional: He is oriented to person, place, and time. He appears well-developed and well-nourished.   HENT:   Head: Normocephalic and atraumatic.   Eyes: Pupils are equal, round, and reactive to light.   Neck: Normal range of motion. Neck supple. No thyromegaly present.   Cardiovascular: Normal rate, regular rhythm, normal heart sounds and intact distal pulses. Exam reveals no gallop and no friction rub.   No murmur heard.  Pulmonary/Chest: Effort normal and breath sounds normal. No respiratory distress. He has no wheezes. He has no rales. He exhibits no tenderness.   Abdominal: Soft. Bowel sounds are normal. There is no tenderness.   Musculoskeletal: Normal range of motion.   Lymphadenopathy:     He has no cervical adenopathy.   Neurological: He is alert and oriented to person, place, and time.   Skin: Skin is warm and dry. Capillary refill takes less than 2 seconds. He is not diaphoretic.   Psychiatric: He has a normal mood and affect. His behavior is normal.   Nursing note and vitals reviewed.        RESULTS    PROBLEM LIST    Problem List Items Addressed This Visit        Respiratory    Snoring       Digestive    Gastroesophageal reflux disease    Overview     Impression: 10/05/2015 - stable  Impression: 02/09/2015 - stable;            Other Visit Diagnoses     Shortness of breath    -  Primary    Relevant Medications    ipratropium (ATROVENT HFA) 17 MCG/ACT inhaler    VENTOLIN  (90 Base)  MCG/ACT inhaler    RIGO (obstructive sleep apnea)        Relevant Orders    Home Sleep Study            DISCUSSION    Mr. Sosa was here for follow-up of his daytime somnolence and fatigue.  He does have loud snoring as well.  He does not feel well rested in the mornings he is interested in pursuing a home sleep study.  I will have this set up an call him once I receive the results of his home sleep study.  If he does have sleep apnea I will get him referred to a place to have an oral appliance fitted.    He will remain on Atrovent and Ventolin for now.  I would have like to see in his PFTs today however they were not performed.  I did advise him that his PFTs the last time he had them performed were normal.    He will continue omeprazole and Zantac daily for GERD.  We also discussed reflux precautions in the office today.    He will follow-up in 6 months or sooner with full PFTs.  He will call with any additional concerns or questions.  I spent 25 minutes with the patient. I spent > 50% percent of this time counseling and discussing diagnosis, prognosis, diagnostic testing, evaluation, current status, treatment options and management.    Keyonna Patel, APRN  11/07/20199:04 AM  Electronically signed     Please note that portions of this note were completed with a voice recognition program. Efforts were made to edit the dictations, but occasionally words are mistranscribed.      CC: Francesca Richardson, DO

## 2019-12-09 ENCOUNTER — HOSPITAL ENCOUNTER (OUTPATIENT)
Dept: SLEEP MEDICINE | Facility: HOSPITAL | Age: 59
Discharge: HOME | End: 2019-12-09
Admitting: NURSE PRACTITIONER

## 2019-12-09 VITALS
HEIGHT: 74 IN | BODY MASS INDEX: 40.43 KG/M2 | SYSTOLIC BLOOD PRESSURE: 131 MMHG | WEIGHT: 315 LBS | DIASTOLIC BLOOD PRESSURE: 73 MMHG | OXYGEN SATURATION: 93 % | HEART RATE: 63 BPM

## 2019-12-09 DIAGNOSIS — G47.33 OSA (OBSTRUCTIVE SLEEP APNEA): ICD-10-CM

## 2019-12-09 PROCEDURE — 95800 SLP STDY UNATTENDED: CPT | Performed by: INTERNAL MEDICINE

## 2019-12-09 PROCEDURE — 95800 SLP STDY UNATTENDED: CPT

## 2019-12-12 ENCOUNTER — TELEPHONE (OUTPATIENT)
Dept: PULMONOLOGY | Facility: CLINIC | Age: 59
End: 2019-12-12

## 2019-12-12 NOTE — TELEPHONE ENCOUNTER
I notified Mr. Sosa's wife about his home sleep study which revealed severe obstructive sleep apnea.  She will relay the message to him that I am going to start him on CPAP therapy.  He had also mentioned possibly getting an oral appliance fitted and advised her that this would be appropriate as well.  I am going to send in the order today to [facility] in [facility] to get set up for auto CPAP.  He will need a follow-up in 31 to 90 days for CPAP compliance.  He will call with any additional concerns or questions.

## 2020-03-23 ENCOUNTER — TELEPHONE (OUTPATIENT)
Dept: PULMONOLOGY | Facility: CLINIC | Age: 60
End: 2020-03-23

## 2020-03-23 DIAGNOSIS — R06.02 SHORTNESS OF BREATH: Primary | ICD-10-CM

## 2020-05-28 ENCOUNTER — OFFICE VISIT (OUTPATIENT)
Dept: PULMONOLOGY | Facility: CLINIC | Age: 60
End: 2020-05-28

## 2020-05-28 VITALS
OXYGEN SATURATION: 98 % | TEMPERATURE: 97.7 F | SYSTOLIC BLOOD PRESSURE: 134 MMHG | HEART RATE: 72 BPM | DIASTOLIC BLOOD PRESSURE: 90 MMHG | WEIGHT: 315 LBS | BODY MASS INDEX: 40.43 KG/M2 | HEIGHT: 74 IN | RESPIRATION RATE: 16 BRPM

## 2020-05-28 DIAGNOSIS — R06.02 SHORTNESS OF BREATH: Primary | ICD-10-CM

## 2020-05-28 DIAGNOSIS — G47.33 SEVERE OBSTRUCTIVE SLEEP APNEA: ICD-10-CM

## 2020-05-28 DIAGNOSIS — K21.9 GASTROESOPHAGEAL REFLUX DISEASE, ESOPHAGITIS PRESENCE NOT SPECIFIED: ICD-10-CM

## 2020-05-28 PROCEDURE — 94618 PULMONARY STRESS TESTING: CPT | Performed by: NURSE PRACTITIONER

## 2020-05-28 PROCEDURE — 99214 OFFICE O/P EST MOD 30 MIN: CPT | Performed by: NURSE PRACTITIONER

## 2020-05-28 RX ORDER — BUDESONIDE 180 UG/1
2 AEROSOL, POWDER RESPIRATORY (INHALATION)
Qty: 3 EACH | Refills: 3 | Status: SHIPPED | OUTPATIENT
Start: 2020-05-28 | End: 2020-06-01 | Stop reason: CLARIF

## 2020-05-28 RX ORDER — ALBUTEROL SULFATE 90 UG/1
2 AEROSOL, METERED RESPIRATORY (INHALATION) EVERY 4 HOURS PRN
Qty: 1 INHALER | Refills: 3 | Status: SHIPPED | OUTPATIENT
Start: 2020-05-28 | End: 2021-01-22 | Stop reason: SDUPTHER

## 2020-05-28 RX ORDER — KETOCONAZOLE 20 MG/G
CREAM TOPICAL
Status: ON HOLD | COMMUNITY
Start: 2020-03-01 | End: 2020-10-14

## 2020-05-28 RX ORDER — PREGABALIN 25 MG/1
25 CAPSULE ORAL 2 TIMES DAILY
COMMUNITY
End: 2020-10-12

## 2020-05-29 DIAGNOSIS — G47.33 SEVERE OBSTRUCTIVE SLEEP APNEA: Primary | ICD-10-CM

## 2020-05-29 NOTE — PROGRESS NOTES
[facility] Pulmonary Follow up    CHIEF COMPLAINT    Dyspnea on exertion    HISTORY OF PRESENT ILLNESS    Margarito Sosa is a 59 y.o.male here today for follow-up of his dyspnea on exertion.  He was last seen in the office in November.  He denies any respiratory illnesses or exacerbations since his last appointment.    He has noticed worsening shortness of breath since his last appointment.  He had a home sleep study performed in December that showed severe obstructive sleep apnea.  I had set him up with CPAP but unfortunately he was intolerant to CPAP and could not wear this.  He has worsening shortness of breath with activity.  He does have to take frequent breaks to recover.  He is requesting oxygen during the day if possible.    He denies fever, chills, sputum production, hemoptysis, night sweats, weight loss, chest pain or palpitations.  He denies any lower extremity edema or calf tenderness.  He denies any sinus or allergy symptoms.  He denies reflux symptoms.    He is up-to-date on his current vaccinations.    Patient Active Problem List   Diagnosis   • Gastroesophageal reflux disease   • Anxiety   • Dyslipidemia   • Essential hypertension   • Impaired glucose tolerance   • Cutaneous candidiasis   • Reactive depression   • Elevated LFTs   • Excessive sweating   • Genital HSV   • High risk sexual behavior   • Joint pain, knee   • Muscle cramps   • Onychomycosis of toenail   • Pain, wrist joint   • Seborrheic dermatitis of scalp   • Tinea pedis   • SVT (supraventricular tachycardia) (CMS/HCC)   • Class 3 obesity in adult   • Snoring   • Tubular adenoma of colon   • Severe obstructive sleep apnea intolerant to CPAP       Allergies   Allergen Reactions   • Penicillins Hives       Current Outpatient Medications:   •  albuterol (PROVENTIL) (2.5 MG/3ML) 0.083% nebulizer solution, Take 2.5 mg by nebulization 4 (Four) Times a Day As Needed for Wheezing., Disp: 125 vial, Rfl: 11  •  albuterol sulfate  (90 Base)  MCG/ACT inhaler, Inhale 2 puffs Every 4 (Four) Hours As Needed for Wheezing., Disp: 1 inhaler, Rfl: 3  •  ALPRAZolam (XANAX) 1 MG tablet, TAKE 1 TABLET BY MOUTH EVERY DAY, Disp: 30 tablet, Rfl: 2  •  amLODIPine (NORVASC) 5 MG tablet, TAKE 1 TABLET BY MOUTH EVERY DAY, Disp: 30 tablet, Rfl: 0  •  azelastine (ASTELIN) 0.1 % nasal spray, 2 sprays into each nostril 2 (Two) Times a Day., Disp: , Rfl: 11  •  clotrimazole (LOTRIMIN) 1 % cream, Apply bid prn rash, Disp: 14 g, Rfl: 1  •  cyclobenzaprine (FLEXERIL) 5 MG tablet, TAKE 1 TABLET BY MOUTH THREE TIMES DAILY AS NEEDED FOR MUSCLE SPASMS, Disp: 90 tablet, Rfl: 0  •  fexofenadine (ALLEGRA) 60 MG tablet, Take 180 mg by mouth Daily., Disp: , Rfl:   •  fluticasone (FLONASE) 50 MCG/ACT nasal spray, 2 sprays into each nostril Daily As Needed., Disp: , Rfl: 11  •  ipratropium (ATROVENT) 0.06 % nasal spray, 1 spray into each nostril Daily As Needed., Disp: , Rfl:   •  ketoconazole (NIZORAL) 2 % cream, APPLY SPARINGLY TO AFFECTED AREA TWICE A DAY, Disp: , Rfl:   •  ketoconazole (NIZORAL) 2 % shampoo, APPLY 1 APPLICATION ON THE SKIN EVERY OTHER DAY, Disp: , Rfl: 11  •  losartan (COZAAR) 100 MG tablet, TAKE 1 TABLET BY MOUTH EVERY DAY, Disp: 90 tablet, Rfl: 0  •  meloxicam (MOBIC) 7.5 MG tablet, TAKE 1 TABLET BY MOUTH EVERY DAY, Disp: 90 tablet, Rfl: 0  •  metoprolol tartrate (LOPRESSOR) 50 MG tablet, TAKE 1 TABLET BY MOUTH TWICE A DAY, Disp: 180 tablet, Rfl: 2  •  nystatin (MYCOSTATIN) 856471 UNIT/GM powder, nystatin 100,000 unit/gram topical powder  APPLY TO THE AFFECTED AREA(S) BY TOPICAL ROUTE 2 TIMES PER DAY, Disp: , Rfl:   •  omeprazole (priLOSEC) 20 MG capsule, TAKE 1 CAPSULE BY MOUTH EVERY DAY, Disp: 30 capsule, Rfl: 0  •  pregabalin (Lyrica) 25 MG capsule, Take 25 mg by mouth 2 (Two) Times a Day., Disp: , Rfl:   •  PULMICORT FLEXHALER 180 MCG/ACT inhaler, Inhale 2 puffs 2 (Two) Times a Day., Disp: 3 each, Rfl: 3  •  ranitidine (ZANTAC) 150 MG tablet, Take 150 mg by mouth  Every Night., Disp: , Rfl: 3  •  tamsulosin (FLOMAX) 0.4 MG capsule 24 hr capsule, Take 0.4 mg by mouth Daily., Disp: , Rfl:   •  Umeclidinium Bromide (Incruse Ellipta) 62.5 MCG/INH aerosol powder , Inhale 1 puff Daily., Disp: 1 each, Rfl: 3  •  valACYclovir (VALTREX) 500 MG tablet, Take 1 tablet by mouth Daily., Disp: 30 tablet, Rfl: 0  MEDICATION LIST AND ALLERGIES REVIEWED.    Social History     Tobacco Use   • Smoking status: Former Smoker     Packs/day: 1.00     Years: 20.00     Pack years: 20.00     Last attempt to quit:      Years since quittin.4   • Smokeless tobacco: Never Used   Substance Use Topics   • Alcohol use: No     Comment: stopped drinking 1 month ago   • Drug use: No       FAMILY AND SOCIAL HISTORY REVIEWED.    Review of Systems   Constitutional: Positive for fatigue. Negative for activity change, appetite change, fever and unexpected weight change.   HENT: Negative for congestion, postnasal drip, rhinorrhea, sinus pressure, sore throat and voice change.    Eyes: Negative for visual disturbance.   Respiratory: Positive for shortness of breath. Negative for cough, chest tightness and wheezing.    Cardiovascular: Negative for chest pain, palpitations and leg swelling.   Gastrointestinal: Negative for abdominal distention, abdominal pain, nausea and vomiting.   Endocrine: Negative for cold intolerance and heat intolerance.   Genitourinary: Negative for difficulty urinating and urgency.   Musculoskeletal: Negative for arthralgias, back pain and neck pain.   Skin: Negative for color change and pallor.   Allergic/Immunologic: Negative for environmental allergies and food allergies.   Neurological: Negative for dizziness, syncope, weakness and light-headedness.   Hematological: Negative for adenopathy. Does not bruise/bleed easily.   Psychiatric/Behavioral: Negative for agitation and behavioral problems.   .    /90 (BP Location: Left arm, Patient Position: Sitting, Cuff Size: Adult)    "Pulse 72   Temp 97.7 °F (36.5 °C)   Resp 16   Ht 188 cm (74\")   Wt (!) 163 kg (360 lb 2 oz)   SpO2 98% Comment: room air at rest  BMI 46.24 kg/m²     Immunization History   Administered Date(s) Administered   • Flu Mist 11/25/2015   • Flu Vaccine Quad PF >18YRS 10/23/2017   • Hepatitis A 05/31/2018, 11/30/2018   • MMR 07/14/1982   • Tdap 09/18/2012   • Varicella 07/14/1982   • Zostavax 06/04/2018   • flucelvax quad pfs =>4 YRS 10/15/2018   • influenza Split 11/04/2016       Physical Exam   Constitutional: He is oriented to person, place, and time. He appears well-developed and well-nourished.   HENT:   Head: Normocephalic and atraumatic.   Eyes: Pupils are equal, round, and reactive to light.   Neck: Normal range of motion. Neck supple. No thyromegaly present.   Cardiovascular: Normal rate, regular rhythm, normal heart sounds and intact distal pulses. Exam reveals no gallop and no friction rub.   No murmur heard.  Pulmonary/Chest: Effort normal and breath sounds normal. No respiratory distress. He has no wheezes. He has no rales. He exhibits no tenderness.   Abdominal: Soft. Bowel sounds are normal. There is no tenderness.   Musculoskeletal: Normal range of motion. He exhibits no edema.   Lymphadenopathy:     He has no cervical adenopathy.   Neurological: He is alert and oriented to person, place, and time.   Skin: Skin is warm and dry. Capillary refill takes less than 2 seconds. He is not diaphoretic.   Psychiatric: He has a normal mood and affect. His behavior is normal.   Nursing note and vitals reviewed.        RESULTS    6-minute walk test:   Patient ambulated 800 feet and never desaturated below 96% through entire testing.  He does not qualify for oxygen with activity at this time.    PROBLEM LIST    Problem List Items Addressed This Visit        Respiratory    Severe obstructive sleep apnea intolerant to CPAP       Digestive    Gastroesophageal reflux disease    Overview     Impression: 10/05/2015 - " stable  Impression: 02/09/2015 - stable;            Other Visit Diagnoses     Shortness of breath    -  Primary    Relevant Medications    PULMICORT FLEXHALER 180 MCG/ACT inhaler    albuterol sulfate  (90 Base) MCG/ACT inhaler    Other Relevant Orders    Walking Oximetry (Completed)            DISCUSSION  Mr. Sosa was here for follow-up of his dyspnea on exertion.  He has noticed worsening shortness of breath with exertion since his last appointment.  He is also gained about 20 pounds since his last visit.  We did discuss weight loss techniques and daily exercise to help with weight loss.  I do suspect that this is contributing to his shortness of breath.    We also discussed his home sleep study which revealed severe obstructive sleep apnea.  He is interested in the oral appliance and I will refer him today to get this fitted.  I did advise him that untreated severe obstructive sleep apnea could be contributing to his shortness of breath as well.    I will start him on Pulmicort inhaler twice a day to see if this helps with his shortness of breath in the meantime.  He will continue to use his albuterol as needed for shortness of breath.    I did encourage him to follow-up with cardiology as he has not had any cardiac work-up in quite some time.  His blood pressure did elevate while he was doing his 6-minute walk.  I offered to order an echo but he declined this at this time.    We did review his 6-minute walk test and he does not qualify for oxygen at this time with activity.  He never desaturated below 96% through entire walk.    He will continue taking omeprazole daily for his GERD.  We also discussed reflux precautions in the office today such as elevating the head of the bed at night, not eating 2 to 3 hours before bed and avoiding foods that trigger reflux symptoms.    He will follow-up in 3 to 4 months or sooner if his symptoms worsen.  He will call with any additional concerns or questions.  I  spent 25 minutes with the patient. I spent > 50% percent of this time counseling and discussing diagnosis, prognosis, diagnostic testing, evaluation, current status, treatment options and management.    Keyonna Patel, LISSA  05/28/20209:44 AM  Electronically signed     Please note that portions of this note were completed with a voice recognition program. Efforts were made to edit the dictations, but occasionally words are mistranscribed.      CC: Francesca Richardson, DO

## 2020-06-01 DIAGNOSIS — R05.3 CHRONIC COUGH: ICD-10-CM

## 2020-06-01 DIAGNOSIS — R06.02 SHORTNESS OF BREATH: Primary | ICD-10-CM

## 2020-06-01 NOTE — TELEPHONE ENCOUNTER
Patient called stating Pulmicort was not covered. Per Keyonna, call in Arnuity 100. Done and patient aware.

## 2020-06-09 ENCOUNTER — TRANSCRIBE ORDERS (OUTPATIENT)
Dept: PULMONOLOGY | Facility: CLINIC | Age: 60
End: 2020-06-09

## 2020-06-15 ENCOUNTER — TELEPHONE (OUTPATIENT)
Dept: PULMONOLOGY | Facility: CLINIC | Age: 60
End: 2020-06-15

## 2020-06-15 DIAGNOSIS — R06.02 SHORTNESS OF BREATH: Primary | ICD-10-CM

## 2020-06-15 NOTE — TELEPHONE ENCOUNTER
Pt notified Rx Atrovent has been sent to [facility] and will need to contact the pharmacy on price. If price too expensive pt has been advised to contact the office to change. Pt verbalized understanding.

## 2020-06-15 NOTE — TELEPHONE ENCOUNTER
Pt called today stating that he was switched from Pulmicort to Arnuity and has noticed having a sore throat. Pt has stopped using Rx Arnuity and wants to try Rx Atrovent inhaler. Okay to change Rx Arnuity to Atrovent. Pt is requesting for this to be sent to [facility] for 3 month supply. Pt can be reached @ [phone].

## 2020-08-31 ENCOUNTER — OFFICE VISIT (OUTPATIENT)
Dept: PULMONOLOGY | Facility: CLINIC | Age: 60
End: 2020-08-31

## 2020-08-31 VITALS
WEIGHT: 315 LBS | RESPIRATION RATE: 18 BRPM | TEMPERATURE: 96.9 F | OXYGEN SATURATION: 95 % | DIASTOLIC BLOOD PRESSURE: 94 MMHG | HEIGHT: 74 IN | BODY MASS INDEX: 40.43 KG/M2 | SYSTOLIC BLOOD PRESSURE: 130 MMHG | HEART RATE: 91 BPM

## 2020-08-31 DIAGNOSIS — K21.9 GASTROESOPHAGEAL REFLUX DISEASE, ESOPHAGITIS PRESENCE NOT SPECIFIED: ICD-10-CM

## 2020-08-31 DIAGNOSIS — G47.33 SEVERE OBSTRUCTIVE SLEEP APNEA: Primary | ICD-10-CM

## 2020-08-31 PROCEDURE — 99213 OFFICE O/P EST LOW 20 MIN: CPT | Performed by: NURSE PRACTITIONER

## 2020-08-31 RX ORDER — LISINOPRIL 40 MG/1
40 TABLET ORAL DAILY
COMMUNITY
Start: 2020-07-27

## 2020-09-01 PROBLEM — R06.02 SHORTNESS OF BREATH: Status: ACTIVE | Noted: 2020-09-01

## 2020-09-01 NOTE — PROGRESS NOTES
[facility] Pulmonary Follow up    CHIEF COMPLAINT    RIGO/dyspnea    HISTORY OF PRESENT ILLNESS    Margarito Sosa is a 60 y.o.male here today for follow-up of his dyspnea.  He was last seen in the office by me in May.  He denies any respiratory illnesses or exacerbations since last appointment.    He continues to have shortness of breath with activity.  He does have to take frequent breaks to recover.  He has albuterol that he will occasionally use during the day as needed for shortness of breath.  He will use this once or twice a day.  He is tried multiple inhalers in the past for his shortness of breath and would like to try another inhaler if possible.    He had a sleep study in December 2019 that showed severe obstructive sleep apnea.  He tried CPAP therapy but was intolerant.  He denies any daytime somnolence, fatigue or nonrestorative sleep.    He denies fever, chills, sputum production, hemoptysis, night sweats, weight loss, chest pain or palpitations.  He denies any lower extremity edema or calf tenderness.  He denies any sinus or allergy symptoms.  He denies reflux symptoms.    He is up-to-date on his current vaccinations.    Patient Active Problem List   Diagnosis   • Gastroesophageal reflux disease   • Anxiety   • Dyslipidemia   • Essential hypertension   • Impaired glucose tolerance   • Cutaneous candidiasis   • Reactive depression   • Elevated LFTs   • Excessive sweating   • Genital HSV   • High risk sexual behavior   • Joint pain, knee   • Muscle cramps   • Onychomycosis of toenail   • Pain, wrist joint   • Seborrheic dermatitis of scalp   • Tinea pedis   • SVT (supraventricular tachycardia) (CMS/HCC)   • Class 3 obesity in adult   • Snoring   • Tubular adenoma of colon   • Severe obstructive sleep apnea intolerant to CPAP   • Shortness of breath       Allergies   Allergen Reactions   • Penicillins Hives       Current Outpatient Medications:   •  albuterol (PROVENTIL) (2.5 MG/3ML) 0.083% nebulizer  solution, Take 2.5 mg by nebulization 4 (Four) Times a Day As Needed for Wheezing., Disp: 125 vial, Rfl: 11  •  albuterol sulfate  (90 Base) MCG/ACT inhaler, Inhale 2 puffs Every 4 (Four) Hours As Needed for Wheezing., Disp: 1 inhaler, Rfl: 3  •  ALPRAZolam (XANAX) 1 MG tablet, TAKE 1 TABLET BY MOUTH EVERY DAY, Disp: 30 tablet, Rfl: 2  •  amLODIPine (NORVASC) 5 MG tablet, TAKE 1 TABLET BY MOUTH EVERY DAY, Disp: 30 tablet, Rfl: 0  •  azelastine (ASTELIN) 0.1 % nasal spray, 2 sprays into each nostril 2 (Two) Times a Day., Disp: , Rfl: 11  •  clotrimazole (LOTRIMIN) 1 % cream, Apply bid prn rash, Disp: 14 g, Rfl: 1  •  cyclobenzaprine (FLEXERIL) 5 MG tablet, TAKE 1 TABLET BY MOUTH THREE TIMES DAILY AS NEEDED FOR MUSCLE SPASMS, Disp: 90 tablet, Rfl: 0  •  fexofenadine (ALLEGRA) 60 MG tablet, Take 180 mg by mouth Daily., Disp: , Rfl:   •  fluticasone (FLONASE) 50 MCG/ACT nasal spray, 2 sprays into each nostril Daily As Needed., Disp: , Rfl: 11  •  ipratropium (ATROVENT) 0.06 % nasal spray, 1 spray into each nostril Daily As Needed., Disp: , Rfl:   •  ketoconazole (NIZORAL) 2 % cream, APPLY SPARINGLY TO AFFECTED AREA TWICE A DAY, Disp: , Rfl:   •  ketoconazole (NIZORAL) 2 % shampoo, APPLY 1 APPLICATION ON THE SKIN EVERY OTHER DAY, Disp: , Rfl: 11  •  lisinopril (PRINIVIL,ZESTRIL) 40 MG tablet, Take 40 mg by mouth Daily., Disp: , Rfl:   •  losartan (COZAAR) 100 MG tablet, TAKE 1 TABLET BY MOUTH EVERY DAY, Disp: 90 tablet, Rfl: 0  •  meloxicam (MOBIC) 7.5 MG tablet, TAKE 1 TABLET BY MOUTH EVERY DAY, Disp: 90 tablet, Rfl: 0  •  metoprolol tartrate (LOPRESSOR) 50 MG tablet, TAKE 1 TABLET BY MOUTH TWICE A DAY, Disp: 180 tablet, Rfl: 2  •  nystatin (MYCOSTATIN) 320885 UNIT/GM powder, nystatin 100,000 unit/gram topical powder  APPLY TO THE AFFECTED AREA(S) BY TOPICAL ROUTE 2 TIMES PER DAY, Disp: , Rfl:   •  omeprazole (priLOSEC) 20 MG capsule, TAKE 1 CAPSULE BY MOUTH EVERY DAY, Disp: 30 capsule, Rfl: 0  •  pregabalin  (Lyrica) 25 MG capsule, Take 25 mg by mouth 2 (Two) Times a Day., Disp: , Rfl:   •  ranitidine (ZANTAC) 150 MG tablet, Take 150 mg by mouth Every Night., Disp: , Rfl: 3  •  tamsulosin (FLOMAX) 0.4 MG capsule 24 hr capsule, Take 0.4 mg by mouth Daily., Disp: , Rfl:   •  valACYclovir (VALTREX) 500 MG tablet, Take 1 tablet by mouth Daily., Disp: 30 tablet, Rfl: 0  •  ipratropium (ATROVENT HFA) 17 MCG/ACT inhaler, Inhale 2 puffs 4 (Four) Times a Day., Disp: 3 inhaler, Rfl: 3  MEDICATION LIST AND ALLERGIES REVIEWED.    Social History     Tobacco Use   • Smoking status: Former Smoker     Packs/day: 1.00     Years: 20.00     Pack years: 20.00     Last attempt to quit:      Years since quittin.6   • Smokeless tobacco: Never Used   Substance Use Topics   • Alcohol use: No     Comment: stopped drinking 1 month ago   • Drug use: No       FAMILY AND SOCIAL HISTORY REVIEWED.    Review of Systems   Constitutional: Negative for activity change, appetite change, fatigue, fever and unexpected weight change.   HENT: Negative for congestion, postnasal drip, rhinorrhea, sinus pressure, sore throat and voice change.    Eyes: Negative for visual disturbance.   Respiratory: Positive for shortness of breath. Negative for cough, chest tightness and wheezing.    Cardiovascular: Negative for chest pain, palpitations and leg swelling.   Gastrointestinal: Negative for abdominal distention, abdominal pain, nausea and vomiting.   Endocrine: Negative for cold intolerance and heat intolerance.   Genitourinary: Negative for difficulty urinating and urgency.   Musculoskeletal: Negative for arthralgias, back pain and neck pain.   Skin: Negative for color change and pallor.   Allergic/Immunologic: Negative for environmental allergies and food allergies.   Neurological: Negative for dizziness, syncope, weakness and light-headedness.   Hematological: Negative for adenopathy. Does not bruise/bleed easily.   Psychiatric/Behavioral: Negative for  "agitation and behavioral problems.   .    /94 (BP Location: Right arm, Patient Position: Sitting, Cuff Size: Adult)   Pulse 91   Temp 96.9 °F (36.1 °C)   Resp 18   Ht 188 cm (74\")   Wt (!) 155 kg (342 lb 6 oz)   SpO2 95% Comment: room air at rest  BMI 43.96 kg/m²     Immunization History   Administered Date(s) Administered   • Flu Mist 11/25/2015   • Flu Vaccine Quad PF >18YRS 10/23/2017   • Hepatitis A 05/31/2018, 11/30/2018   • MMR 07/14/1982   • Tdap 09/18/2012   • Varicella 07/14/1982   • Zostavax 06/04/2018   • flucelvax quad pfs =>4 YRS 10/15/2018   • influenza Split 11/04/2016       Physical Exam   Constitutional: He is oriented to person, place, and time. He appears well-developed and well-nourished.   HENT:   Head: Normocephalic and atraumatic.   Eyes: Pupils are equal, round, and reactive to light.   Neck: Normal range of motion. Neck supple. No thyromegaly present.   Cardiovascular: Normal rate, regular rhythm, normal heart sounds and intact distal pulses. Exam reveals no gallop and no friction rub.   No murmur heard.  Pulmonary/Chest: Effort normal and breath sounds normal. No respiratory distress. He has no wheezes. He has no rales. He exhibits no tenderness.   Abdominal: Soft. Bowel sounds are normal. There is no tenderness.   Musculoskeletal: Normal range of motion. He exhibits no edema.   Lymphadenopathy:     He has no cervical adenopathy.   Neurological: He is alert and oriented to person, place, and time.   Skin: Skin is warm and dry. Capillary refill takes less than 2 seconds. He is not diaphoretic.   Psychiatric: He has a normal mood and affect. His behavior is normal.   Nursing note and vitals reviewed.        RESULTS      PROBLEM LIST    Problem List Items Addressed This Visit        Respiratory    Severe obstructive sleep apnea intolerant to CPAP - Primary       Digestive    Gastroesophageal reflux disease    Overview     Impression: 10/05/2015 - stable  Impression: 02/09/2015 - " stable;                  DISCUSSION    Mr. Sosa was here for follow-up of his dyspnea on exertion.  We did discuss using his albuterol as needed for shortness of breath.  He has used Atrovent in the past but unfortunately his insurance would not pay for this.  I did give him some samples of Bevespi to use 2 puffs in the morning and 2 puffs at night.  He states that he does better with the aerosol inhalers.  I did advise him that if he noticed improvement in his breathing we would order this inhaler for him.    I did advise him that his untreated sleep apnea is probably contributing to his dyspnea on exertion.  He states that he refuses to wear CPAP.    I did offer to order an echo as he has not had one in several years to see if this is contributing to his shortness of breath as well.  He declined this offer.    I did encourage daily physical activity and weight loss to help with his breathing.    He will follow-up in 6 months or sooner if his symptoms worsen.  He will call with any additional concerns or questions.  I spent 15 minutes with the patient. I spent > 50% percent of this time counseling and discussing diagnosis, prognosis, diagnostic testing, evaluation, current status, treatment options and management.    Keyonna Patel, APRN  08/31/20209:11 AM  Electronically signed     Please note that portions of this note were completed with a voice recognition program. Efforts were made to edit the dictations, but occasionally words are mistranscribed.      CC: Davon Quinn MD

## 2020-10-12 ENCOUNTER — APPOINTMENT (OUTPATIENT)
Dept: PREADMISSION TESTING | Facility: HOSPITAL | Age: 60
End: 2020-10-12

## 2020-10-12 VITALS — HEIGHT: 74 IN | WEIGHT: 315 LBS | BODY MASS INDEX: 40.43 KG/M2

## 2020-10-12 LAB
ANION GAP SERPL CALCULATED.3IONS-SCNC: 9 MMOL/L (ref 5–15)
BUN SERPL-MCNC: 13 MG/DL (ref 6–20)
BUN/CREAT SERPL: 14 (ref 7–25)
CALCIUM SPEC-SCNC: 9.2 MG/DL (ref 8.6–10.5)
CHLORIDE SERPL-SCNC: 103 MMOL/L (ref 98–107)
CO2 SERPL-SCNC: 28 MMOL/L (ref 22–29)
CREAT SERPL-MCNC: 0.93 MG/DL (ref 0.76–1.27)
DEPRECATED RDW RBC AUTO: 43.7 FL (ref 37–54)
ERYTHROCYTE [DISTWIDTH] IN BLOOD BY AUTOMATED COUNT: 13.3 % (ref 12.3–15.4)
GFR SERPL CREATININE-BSD FRML MDRD: 83 ML/MIN/1.73
GLUCOSE SERPL-MCNC: 105 MG/DL (ref 65–99)
HCT VFR BLD AUTO: 47 % (ref 37.5–51)
HGB BLD-MCNC: 15.7 G/DL (ref 13–17.7)
MCH RBC QN AUTO: 29.8 PG (ref 26.6–33)
MCHC RBC AUTO-ENTMCNC: 33.4 G/DL (ref 31.5–35.7)
MCV RBC AUTO: 89.4 FL (ref 79–97)
PLATELET # BLD AUTO: 222 10*3/MM3 (ref 140–450)
PMV BLD AUTO: 9.5 FL (ref 6–12)
POTASSIUM SERPL-SCNC: 4.3 MMOL/L (ref 3.5–5.2)
RBC # BLD AUTO: 5.26 10*6/MM3 (ref 4.14–5.8)
SODIUM SERPL-SCNC: 140 MMOL/L (ref 136–145)
WBC # BLD AUTO: 7.11 10*3/MM3 (ref 3.4–10.8)

## 2020-10-12 PROCEDURE — U0004 COV-19 TEST NON-CDC HGH THRU: HCPCS

## 2020-10-12 PROCEDURE — C9803 HOPD COVID-19 SPEC COLLECT: HCPCS

## 2020-10-12 PROCEDURE — 93005 ELECTROCARDIOGRAM TRACING: CPT

## 2020-10-12 PROCEDURE — 80048 BASIC METABOLIC PNL TOTAL CA: CPT | Performed by: UROLOGY

## 2020-10-12 PROCEDURE — 85027 COMPLETE CBC AUTOMATED: CPT | Performed by: UROLOGY

## 2020-10-12 PROCEDURE — 36415 COLL VENOUS BLD VENIPUNCTURE: CPT

## 2020-10-12 PROCEDURE — 93010 ELECTROCARDIOGRAM REPORT: CPT | Performed by: INTERNAL MEDICINE

## 2020-10-12 RX ORDER — SILDENAFIL CITRATE 20 MG/1
20 TABLET ORAL 4 TIMES DAILY
COMMUNITY

## 2020-10-12 NOTE — PAT

## 2020-10-13 ENCOUNTER — ANESTHESIA EVENT (OUTPATIENT)
Dept: PERIOP | Facility: HOSPITAL | Age: 60
End: 2020-10-13

## 2020-10-13 LAB — SARS-COV-2 RNA RESP QL NAA+PROBE: NOT DETECTED

## 2020-10-13 RX ORDER — FAMOTIDINE 10 MG/ML
20 INJECTION, SOLUTION INTRAVENOUS ONCE
Status: CANCELLED | OUTPATIENT
Start: 2020-10-13 | End: 2020-10-13

## 2020-10-13 RX ORDER — SODIUM CHLORIDE 0.9 % (FLUSH) 0.9 %
10 SYRINGE (ML) INJECTION EVERY 12 HOURS SCHEDULED
Status: CANCELLED | OUTPATIENT
Start: 2020-10-13

## 2020-10-13 RX ORDER — SODIUM CHLORIDE 0.9 % (FLUSH) 0.9 %
10 SYRINGE (ML) INJECTION AS NEEDED
Status: CANCELLED | OUTPATIENT
Start: 2020-10-13

## 2020-10-14 ENCOUNTER — HOSPITAL ENCOUNTER (OUTPATIENT)
Facility: HOSPITAL | Age: 60
Setting detail: HOSPITAL OUTPATIENT SURGERY
Discharge: HOME | End: 2020-10-14
Attending: UROLOGY | Admitting: UROLOGY

## 2020-10-14 ENCOUNTER — ANESTHESIA (OUTPATIENT)
Dept: PERIOP | Facility: HOSPITAL | Age: 60
End: 2020-10-14

## 2020-10-14 VITALS
DIASTOLIC BLOOD PRESSURE: 78 MMHG | BODY MASS INDEX: 40.43 KG/M2 | WEIGHT: 315 LBS | TEMPERATURE: 97.8 F | HEART RATE: 94 BPM | SYSTOLIC BLOOD PRESSURE: 127 MMHG | HEIGHT: 74 IN | OXYGEN SATURATION: 97 % | RESPIRATION RATE: 18 BRPM

## 2020-10-14 DIAGNOSIS — N13.8 BPH WITH URINARY OBSTRUCTION: Primary | ICD-10-CM

## 2020-10-14 DIAGNOSIS — N40.1 BPH WITH URINARY OBSTRUCTION: Primary | ICD-10-CM

## 2020-10-14 PROCEDURE — 25010000002 NEOSTIGMINE 10 MG/10ML SOLUTION: Performed by: NURSE ANESTHETIST, CERTIFIED REGISTERED

## 2020-10-14 PROCEDURE — 94799 UNLISTED PULMONARY SVC/PX: CPT

## 2020-10-14 PROCEDURE — 25010000002 FENTANYL CITRATE (PF) 100 MCG/2ML SOLUTION: Performed by: NURSE ANESTHETIST, CERTIFIED REGISTERED

## 2020-10-14 PROCEDURE — 25010000002 ONDANSETRON PER 1 MG: Performed by: NURSE ANESTHETIST, CERTIFIED REGISTERED

## 2020-10-14 PROCEDURE — 25010000002 PHENYLEPHRINE PER 1 ML: Performed by: NURSE ANESTHETIST, CERTIFIED REGISTERED

## 2020-10-14 PROCEDURE — 25010000002 DEXAMETHASONE PER 1 MG: Performed by: NURSE ANESTHETIST, CERTIFIED REGISTERED

## 2020-10-14 PROCEDURE — 94640 AIRWAY INHALATION TREATMENT: CPT

## 2020-10-14 PROCEDURE — 25010000002 PROPOFOL 10 MG/ML EMULSION: Performed by: NURSE ANESTHETIST, CERTIFIED REGISTERED

## 2020-10-14 PROCEDURE — 25010000002 SUCCINYLCHOLINE PER 20 MG: Performed by: NURSE ANESTHETIST, CERTIFIED REGISTERED

## 2020-10-14 RX ORDER — FAMOTIDINE 20 MG/1
20 TABLET, FILM COATED ORAL ONCE
Status: DISCONTINUED | OUTPATIENT
Start: 2020-10-14 | End: 2020-10-14 | Stop reason: HOSPADM

## 2020-10-14 RX ORDER — HYDROCODONE BITARTRATE AND ACETAMINOPHEN 5; 325 MG/1; MG/1
1 TABLET ORAL EVERY 6 HOURS PRN
Qty: 20 TABLET | Refills: 0 | Status: SHIPPED | OUTPATIENT
Start: 2020-10-14 | End: 2021-01-22

## 2020-10-14 RX ORDER — SODIUM CHLORIDE, SODIUM LACTATE, POTASSIUM CHLORIDE, CALCIUM CHLORIDE 600; 310; 30; 20 MG/100ML; MG/100ML; MG/100ML; MG/100ML
9 INJECTION, SOLUTION INTRAVENOUS CONTINUOUS
Status: DISCONTINUED | OUTPATIENT
Start: 2020-10-14 | End: 2020-10-14 | Stop reason: HOSPADM

## 2020-10-14 RX ORDER — GLYCOPYRROLATE 0.2 MG/ML
INJECTION INTRAMUSCULAR; INTRAVENOUS AS NEEDED
Status: DISCONTINUED | OUTPATIENT
Start: 2020-10-14 | End: 2020-10-14 | Stop reason: SURG

## 2020-10-14 RX ORDER — PROMETHAZINE HYDROCHLORIDE 25 MG/1
25 SUPPOSITORY RECTAL ONCE AS NEEDED
Status: DISCONTINUED | OUTPATIENT
Start: 2020-10-14 | End: 2020-10-14 | Stop reason: HOSPADM

## 2020-10-14 RX ORDER — SUCCINYLCHOLINE CHLORIDE 20 MG/ML
INJECTION INTRAMUSCULAR; INTRAVENOUS AS NEEDED
Status: DISCONTINUED | OUTPATIENT
Start: 2020-10-14 | End: 2020-10-14 | Stop reason: SURG

## 2020-10-14 RX ORDER — FENTANYL CITRATE 50 UG/ML
50 INJECTION, SOLUTION INTRAMUSCULAR; INTRAVENOUS
Status: DISCONTINUED | OUTPATIENT
Start: 2020-10-14 | End: 2020-10-14 | Stop reason: HOSPADM

## 2020-10-14 RX ORDER — CEPHALEXIN 500 MG/1
500 CAPSULE ORAL 4 TIMES DAILY
Qty: 20 CAPSULE | Refills: 0 | Status: SHIPPED | OUTPATIENT
Start: 2020-10-14 | End: 2020-10-19

## 2020-10-14 RX ORDER — ROCURONIUM BROMIDE 10 MG/ML
INJECTION, SOLUTION INTRAVENOUS AS NEEDED
Status: DISCONTINUED | OUTPATIENT
Start: 2020-10-14 | End: 2020-10-14 | Stop reason: SURG

## 2020-10-14 RX ORDER — DEXAMETHASONE SODIUM PHOSPHATE 4 MG/ML
INJECTION, SOLUTION INTRA-ARTICULAR; INTRALESIONAL; INTRAMUSCULAR; INTRAVENOUS; SOFT TISSUE AS NEEDED
Status: DISCONTINUED | OUTPATIENT
Start: 2020-10-14 | End: 2020-10-14 | Stop reason: SURG

## 2020-10-14 RX ORDER — ONDANSETRON 2 MG/ML
4 INJECTION INTRAMUSCULAR; INTRAVENOUS ONCE AS NEEDED
Status: DISCONTINUED | OUTPATIENT
Start: 2020-10-14 | End: 2020-10-14 | Stop reason: HOSPADM

## 2020-10-14 RX ORDER — MAGNESIUM HYDROXIDE 1200 MG/15ML
LIQUID ORAL AS NEEDED
Status: DISCONTINUED | OUTPATIENT
Start: 2020-10-14 | End: 2020-10-14 | Stop reason: HOSPADM

## 2020-10-14 RX ORDER — PROMETHAZINE HYDROCHLORIDE 25 MG/1
25 TABLET ORAL ONCE AS NEEDED
Status: DISCONTINUED | OUTPATIENT
Start: 2020-10-14 | End: 2020-10-14 | Stop reason: HOSPADM

## 2020-10-14 RX ORDER — IPRATROPIUM BROMIDE AND ALBUTEROL SULFATE 2.5; .5 MG/3ML; MG/3ML
3 SOLUTION RESPIRATORY (INHALATION) ONCE
Status: COMPLETED | OUTPATIENT
Start: 2020-10-14 | End: 2020-10-14

## 2020-10-14 RX ORDER — FENTANYL CITRATE 50 UG/ML
INJECTION, SOLUTION INTRAMUSCULAR; INTRAVENOUS AS NEEDED
Status: DISCONTINUED | OUTPATIENT
Start: 2020-10-14 | End: 2020-10-14 | Stop reason: SURG

## 2020-10-14 RX ORDER — LIDOCAINE HYDROCHLORIDE 10 MG/ML
0.5 INJECTION, SOLUTION EPIDURAL; INFILTRATION; INTRACAUDAL; PERINEURAL ONCE AS NEEDED
Status: COMPLETED | OUTPATIENT
Start: 2020-10-14 | End: 2020-10-14

## 2020-10-14 RX ORDER — ONDANSETRON 2 MG/ML
INJECTION INTRAMUSCULAR; INTRAVENOUS AS NEEDED
Status: DISCONTINUED | OUTPATIENT
Start: 2020-10-14 | End: 2020-10-14 | Stop reason: SURG

## 2020-10-14 RX ORDER — CEFAZOLIN SODIUM IN 0.9 % NACL 3 G/100 ML
3 INTRAVENOUS SOLUTION, PIGGYBACK (ML) INTRAVENOUS ONCE
Status: COMPLETED | OUTPATIENT
Start: 2020-10-14 | End: 2020-10-14

## 2020-10-14 RX ORDER — PROPOFOL 10 MG/ML
VIAL (ML) INTRAVENOUS AS NEEDED
Status: DISCONTINUED | OUTPATIENT
Start: 2020-10-14 | End: 2020-10-14 | Stop reason: SURG

## 2020-10-14 RX ORDER — IPRATROPIUM BROMIDE AND ALBUTEROL SULFATE 2.5; .5 MG/3ML; MG/3ML
3 SOLUTION RESPIRATORY (INHALATION)
Status: COMPLETED | OUTPATIENT
Start: 2020-10-14 | End: 2020-10-14

## 2020-10-14 RX ORDER — LIDOCAINE HYDROCHLORIDE 10 MG/ML
INJECTION, SOLUTION EPIDURAL; INFILTRATION; INTRACAUDAL; PERINEURAL AS NEEDED
Status: DISCONTINUED | OUTPATIENT
Start: 2020-10-14 | End: 2020-10-14 | Stop reason: SURG

## 2020-10-14 RX ORDER — HYDROMORPHONE HYDROCHLORIDE 1 MG/ML
0.5 INJECTION, SOLUTION INTRAMUSCULAR; INTRAVENOUS; SUBCUTANEOUS
Status: DISCONTINUED | OUTPATIENT
Start: 2020-10-14 | End: 2020-10-14 | Stop reason: HOSPADM

## 2020-10-14 RX ORDER — NEOSTIGMINE METHYLSULFATE 1 MG/ML
INJECTION, SOLUTION INTRAVENOUS AS NEEDED
Status: DISCONTINUED | OUTPATIENT
Start: 2020-10-14 | End: 2020-10-14 | Stop reason: SURG

## 2020-10-14 RX ORDER — MEPERIDINE HYDROCHLORIDE 25 MG/ML
12.5 INJECTION INTRAMUSCULAR; INTRAVENOUS; SUBCUTANEOUS
Status: DISCONTINUED | OUTPATIENT
Start: 2020-10-14 | End: 2020-10-14 | Stop reason: HOSPADM

## 2020-10-14 RX ADMIN — GLYCOPYRROLATE 0.8 MG: 0.2 INJECTION INTRAMUSCULAR; INTRAVENOUS at 11:17

## 2020-10-14 RX ADMIN — IPRATROPIUM BROMIDE AND ALBUTEROL SULFATE 3 ML: 2.5; .5 SOLUTION RESPIRATORY (INHALATION) at 08:21

## 2020-10-14 RX ADMIN — SODIUM CHLORIDE, POTASSIUM CHLORIDE, SODIUM LACTATE AND CALCIUM CHLORIDE 9 ML/HR: 600; 310; 30; 20 INJECTION, SOLUTION INTRAVENOUS at 08:19

## 2020-10-14 RX ADMIN — NEOSTIGMINE 4 MG: 1 INJECTION INTRAVENOUS at 11:17

## 2020-10-14 RX ADMIN — ROCURONIUM BROMIDE 40 MG: 10 INJECTION INTRAVENOUS at 10:08

## 2020-10-14 RX ADMIN — SUCCINYLCHOLINE CHLORIDE 200 MG: 20 INJECTION, SOLUTION INTRAMUSCULAR; INTRAVENOUS; PARENTERAL at 09:46

## 2020-10-14 RX ADMIN — PROPOFOL 200 MG: 10 INJECTION, EMULSION INTRAVENOUS at 09:46

## 2020-10-14 RX ADMIN — LIDOCAINE HYDROCHLORIDE 50 MG: 10 INJECTION, SOLUTION EPIDURAL; INFILTRATION; INTRACAUDAL; PERINEURAL at 09:46

## 2020-10-14 RX ADMIN — PROPOFOL 50 MG: 10 INJECTION, EMULSION INTRAVENOUS at 11:13

## 2020-10-14 RX ADMIN — SODIUM CHLORIDE, POTASSIUM CHLORIDE, SODIUM LACTATE AND CALCIUM CHLORIDE: 600; 310; 30; 20 INJECTION, SOLUTION INTRAVENOUS at 09:40

## 2020-10-14 RX ADMIN — DEXAMETHASONE SODIUM PHOSPHATE 8 MG: 4 INJECTION, SOLUTION INTRAMUSCULAR; INTRAVENOUS at 09:57

## 2020-10-14 RX ADMIN — FENTANYL CITRATE 50 MCG: 50 INJECTION, SOLUTION INTRAMUSCULAR; INTRAVENOUS at 09:46

## 2020-10-14 RX ADMIN — Medication 3 G: at 09:41

## 2020-10-14 RX ADMIN — LIDOCAINE HYDROCHLORIDE 0.5 ML: 10 INJECTION, SOLUTION EPIDURAL; INFILTRATION; INTRACAUDAL; PERINEURAL at 08:20

## 2020-10-14 RX ADMIN — ONDANSETRON 4 MG: 2 INJECTION INTRAMUSCULAR; INTRAVENOUS at 11:17

## 2020-10-14 RX ADMIN — PROPOFOL 100 MG: 10 INJECTION, EMULSION INTRAVENOUS at 10:08

## 2020-10-14 RX ADMIN — FENTANYL CITRATE 50 MCG: 0.05 INJECTION, SOLUTION INTRAMUSCULAR; INTRAVENOUS at 12:51

## 2020-10-14 RX ADMIN — IPRATROPIUM BROMIDE AND ALBUTEROL SULFATE 3 ML: 2.5; .5 SOLUTION RESPIRATORY (INHALATION) at 12:12

## 2020-10-14 RX ADMIN — PHENYLEPHRINE HYDROCHLORIDE 80 MCG: 10 INJECTION INTRAVENOUS at 10:25

## 2020-10-14 RX ADMIN — FENTANYL CITRATE 50 MCG: 50 INJECTION, SOLUTION INTRAMUSCULAR; INTRAVENOUS at 11:04

## 2020-10-14 NOTE — ANESTHESIA PROCEDURE NOTES
Airway  Urgency: elective    Date/Time: 10/14/2020 9:48 AM    General Information and Staff    Patient location during procedure: OR  CRNA: Osmel Schwarz CRNA    Indications and Patient Condition  Indications for airway management: airway protection    Preoxygenated: yes  MILS not maintained throughout  Mask difficulty assessment: 0 - not attempted    Final Airway Details  Final airway type: endotracheal airway      Successful airway: ETT  Cuffed: yes   Successful intubation technique: video laryngoscopy  Endotracheal tube insertion site: oral  Blade: Linares  Blade size: 4  ETT size (mm): 7.5  Cormack-Lehane Classification: grade I - full view of glottis  Placement verified by: chest auscultation and capnometry   Measured from: lips  Number of attempts at approach: 1  Assessment: lips, teeth, and gum same as pre-op and atraumatic intubation

## 2020-10-14 NOTE — BRIEF OP NOTE
CYSTOSCOPY TRANSURETHRAL RESECTION OF PROSTATE GREENLIGHT  Progress Note    Margarito Osegeura Ian  10/14/2020    Pre-op Diagnosis:   BPH with obstruction       Post-Op Diagnosis Codes:  Same    Procedure/CPT® Codes:        Procedure(s):  Transrectal ultrasound, CYSTOSCOPY WITH  GREENLIGHT laser vaporization of the prostate    Surgeon(s):  Chandler Hameed MD    Anesthesia: General    Staff:   Circulator: Ryanne Mann RN; Araseli Duke RN; Brook Mandel RN  Scrub Person: Oscar Beltran; Ty Rosa RN  Vendor Representative: Mary Low         Estimated Blood Loss: 200ml    Urine Voided: * No values recorded between 10/14/2020  9:40 AM and 10/14/2020 11:22 AM *    Specimens:                None          Drains:   Urethral Catheter Silicone 22 Fr. (Active)       Findings: Trilobar hyperplasia with obstruction    Complications: None, to recovery room stable          Chandler Hameed MD     Date: 10/14/2020  Time: 11:29 EDT

## 2020-10-14 NOTE — PERIOPERATIVE NURSING NOTE
Pt sleeping, snoring and apnea noted. RA SpO2 85-91%. O2 2L NC applied. SpO2 continuing to drop to 86-89%. O2 increased to 3L NC. No change noted. Venti mask applied, pt tolerating well SpO2 92-95%. Will monitor

## 2020-10-14 NOTE — H&P
Pre-Op H&P  Margarito Sosa  [id number]  1960    Chief complaint: BPH    HPI:    Patient is a 60 y.o.male who presents with a diagnosis of BPH. Surgical intervention is recommended and he is agreeable. He is here today for cystoscopy and transurethral resection of the prostate.     Review of Systems:  General ROS: negative for chills, fever or skin lesions;  No changes since last office visit.  Neg for recent sick exposure  Cardiovascular ROS: no chest pain or dyspnea on exertion; +HTN  Respiratory ROS: no cough, shortness of breath, or wheezing; +RIGO (does not use CPAP), former cigarette smoker (1 ppd x 20 years)    Allergies:   Allergies   Allergen Reactions   • Penicillins Hives       Home Meds:    No current facility-administered medications on file prior to encounter.      Current Outpatient Medications on File Prior to Encounter   Medication Sig Dispense Refill   • albuterol sulfate  (90 Base) MCG/ACT inhaler Inhale 2 puffs Every 4 (Four) Hours As Needed for Wheezing. 1 inhaler 3   • ALPRAZolam (XANAX) 1 MG tablet TAKE 1 TABLET BY MOUTH EVERY DAY (Patient taking differently: Take 1 mg by mouth As Needed.) 30 tablet 2   • cyclobenzaprine (FLEXERIL) 5 MG tablet TAKE 1 TABLET BY MOUTH THREE TIMES DAILY AS NEEDED FOR MUSCLE SPASMS (Patient taking differently: Take 5 mg by mouth 3 (Three) Times a Day As Needed.) 90 tablet 0   • fexofenadine (ALLEGRA) 60 MG tablet Take 180 mg by mouth Daily.     • ketoconazole (NIZORAL) 2 % shampoo APPLY 1 APPLICATION ON THE SKIN EVERY OTHER DAY  11   • lisinopril (PRINIVIL,ZESTRIL) 40 MG tablet Take 40 mg by mouth Daily.     • metoprolol tartrate (LOPRESSOR) 50 MG tablet TAKE 1 TABLET BY MOUTH TWICE A DAY (Patient taking differently: Take 50 mg by mouth 2 (Two) Times a Day.) 180 tablet 2   • omeprazole (priLOSEC) 20 MG capsule TAKE 1 CAPSULE BY MOUTH EVERY DAY (Patient taking differently: Take 20 mg by mouth Daily.) 30 capsule 0   • clotrimazole (LOTRIMIN) 1 % cream  Apply bid prn rash (Patient taking differently: Apply  topically to the appropriate area as directed As Needed. Apply bid prn rash) 14 g 1   • [DISCONTINUED] albuterol (PROVENTIL) (2.5 MG/3ML) 0.083% nebulizer solution Take 2.5 mg by nebulization 4 (Four) Times a Day As Needed for Wheezing. 125 vial 11   • [DISCONTINUED] ipratropium (ATROVENT HFA) 17 MCG/ACT inhaler Inhale 2 puffs 4 (Four) Times a Day. 3 inhaler 3   • [DISCONTINUED] ipratropium (ATROVENT) 0.06 % nasal spray 1 spray into each nostril Daily As Needed.     • [DISCONTINUED] ketoconazole (NIZORAL) 2 % cream APPLY SPARINGLY TO AFFECTED AREA TWICE A DAY         PMH:   Past Medical History:   Diagnosis Date   • Anxiety    • BPH (benign prostatic hyperplasia)    • Depression    • Generalized osteoarthritis    • H/O colonoscopy    • History of colon polyps     sigmoid colon - RESULT WAS PRE-CANCEROUS   • History of diverticulosis    • History of esophageal reflux    • History of hiatal hernia    • History of irritable bowel syndrome    • History of nicotine dependence    • History of osteoarthritis    • Hypertension    • Yennifer-implantitis, dental     LOWER   • Sleep apnea     DOES NOT USE CPAP   • Wears glasses     RX     PSH:    Past Surgical History:   Procedure Laterality Date   • CARDIOVERSION      with asdenoside for SVT   • COLONOSCOPY     • FOOT SURGERY      club feet   • HERNIA REPAIR     • KNEE ACL RECONSTRUCTION     • KNEE ARTHROSCOPY      Right   • LAMINECTOMY      Lumbar lower back   • SHOULDER SURGERY      Right   • TONSILLECTOMY         Social History:   Tobacco:   Social History     Tobacco Use   Smoking Status Former Smoker   • Packs/day: 1.00   • Years: 20.00   • Pack years: 20.00   • Quit date:    • Years since quittin.7   Smokeless Tobacco Never Used      Alcohol:     Social History     Substance and Sexual Activity   Alcohol Use No       Vitals:           /94 (BP Location: Right arm, Patient Position: Lying)   Pulse 63    "Temp 97.2 °F (36.2 °C) (Temporal)   Resp 18   Ht 188 cm (74\")   Wt (!) 156 kg (345 lb)   SpO2 92%   BMI 44.30 kg/m²     Physical Exam:  General Appearance:    Alert, cooperative, no distress, appears stated age   Head:    Normocephalic, without obvious abnormality, atraumatic   Lungs:     Clear to auscultation bilaterally, diminished lung sounds bilaterally, respirations unlabored    Heart:   Regular rate and rhythm, S1 and S2 normal, no murmur, rub    or gallop    Abdomen:    Soft, non-tender, +bowel sounds   Breast Exam:    deferred   Genitalia:    deferred   Extremities:   Extremities normal, atraumatic, no cyanosis or edema   Skin:   Skin color, texture, turgor normal, no rashes or lesions   Neurologic:   Grossly intact   Results Review  LABS:  Lab Results   Component Value Date    WBC 7.11 10/12/2020    HGB 15.7 10/12/2020    HCT 47.0 10/12/2020    MCV 89.4 10/12/2020     10/12/2020    NEUTROABS 3.41 05/07/2019    GLUCOSE 105 (H) 10/12/2020    BUN 13 10/12/2020    CREATININE 0.93 10/12/2020    EGFRIFNONA 83 10/12/2020    EGFRIFAFRI 106 02/12/2015     10/12/2020    K 4.3 10/12/2020     10/12/2020    CO2 28.0 10/12/2020    MG 2.2 10/23/2017    CALCIUM 9.2 10/12/2020    ALBUMIN 4.60 05/07/2019    AST 24 05/07/2019    ALT 25 05/07/2019    BILITOT 0.6 05/07/2019       RADIOLOGY:  Imaging Results (Last 72 Hours)     ** No results found for the last 72 hours. **          I reviewed the patient's new clinical results.     COVID-19: negative  10/12/20 ECG: reviewed, NSR    4/20/18 CXR: no acute cardiopulmonary disease    12/4/17 TTE:  · Left atrial cavity size is mildly dilated.  · The left ventricular cavity is moderately dilated.  · Left ventricular systolic function is normal. Estimated EF = 60%.  · Left ventricular diastolic dysfunction (grade I) consistent with impaired relaxation.  · There is no evidence of pericardial effusion.  · No evidence of pulmonary hypertension is present.  · Normal " right ventricular wall thickness, systolic function and septal motion noted with right ventricular cavity mildly dilated.  · No significant structural valvular abnormality demonstrated.     12/4/17 Stress test:  · Chemical stress test completed without complications.  · The test is negative for anginal symptoms or diagnostic ECG changes.  · Myocardial perfusion imaging indicates a normal myocardial perfusion study with no evidence of ischemia.  · Left ventricular ejection fraction is normal (Calculated EF = 64%).  · Impressions are consistent with a low risk study.            Cancer Staging (if applicable)  Cancer Patient: __ yes _X_no __unknown; If yes, clinical stage T:__ N:__M:__, stage group or __N/A    Impression: BPH    Plan: Cystoscopy and transurethral resection of the prostate    Lizzette Mendez, LISSA   10/14/2020   08:29 EDT

## 2020-10-14 NOTE — ANESTHESIA POSTPROCEDURE EVALUATION
Patient: Margarito Sosa    Procedure Summary     Date: 10/14/20 Room / Location:  [facility] /  [facility]    Anesthesia Start: 0940 Anesthesia Stop:     Procedure: CYSTOSCOPY TRANSURETHRAL RESECTION OF PROSTATE GREENLIGHT (N/A ) Diagnosis:     Surgeon: Chandler Hameed MD Provider: Franck Kelly MD    Anesthesia Type: general ASA Status: 3          Anesthesia Type: general    Vitals  Vitals Value Taken Time   /81 10/14/20 1135   Temp 97 °F (36.1 °C) 10/14/20 1140   Pulse 90 10/14/20 1140   Resp     SpO2 96 % 10/14/20 1140   Vitals shown include unvalidated device data.        Post Anesthesia Care and Evaluation    Patient location during evaluation: PACU  Patient participation: complete - patient participated  Level of consciousness: awake and alert  Pain score: 0  Pain management: adequate  Airway patency: patent  Anesthetic complications: No anesthetic complications  PONV Status: none  Cardiovascular status: hemodynamically stable and acceptable  Respiratory status: nonlabored ventilation, acceptable and face mask  Hydration status: acceptable

## 2020-10-14 NOTE — OP NOTE
Preop diagnosis: BPH with obstruction  Postoperative diagnosis: Same  Surgeon: Zari  Anesthesia: General  Procedure performed: transrectal ultrasound, cystoscopy with greenlight laser vaporization of the prostate (385,291 J)  Indications: This a 60-year-old gentleman with symptomatic prostatism refractory to medical therapy.  He has consented for operative resection of his prostate gland for symptom improvement.  Operative description: Patient was placed operating table in the dorsolithotomy position.  General tracheal anesthesia was administered.  His groin was prepped and draped usual sterile fashion.  The anchor.travel transrectal ultrasound probe was introduced into the rectum.  Volume measurements of the prostate were performed.  His gland measured 57.3 mill millimeters in width 38.2 mm in height, 48.5 mm in length for a volume of 55.5 cm³.  The probe was then removed atraumatically.    The 24 Danish Olympus laser scope was introduced and the bladder was inspected.  He has trilobar hyperplasia that is anatomically obstructing and bulges into the base of the bladder.  Orifice ease in their normal location and effluxing clear urine.  The greenlight side firing laser fiber was introduced.  Then proceeded to vaporize the lateral lobes and the median lobe of his prostate with 80 to 150 W of power.  At no time was vaporization carried out proximal to the bladder neck no distal to the verumontanum except for the intra-vesicle bulge of the lateral lobe of his prostate.  Hemostasis was maintained with electrocautery.  Vaporization opened up a very good channel.  At the end of the procedure I inspected the orifice ease and the sphincter and very verumontanum which were all uninjured.  The scope was then removed atraumatically and a 22 Danish three-way Trujillo catheter was inserted connected to continuous irrigation.  He was awakened and taken to recovery room in stable condition.  There were no complications.    The hospital  dictation system is broken.  This was done a voice recognition system.  There may be significant transcription errors.

## 2020-10-14 NOTE — ANESTHESIA PREPROCEDURE EVALUATION
Anesthesia Evaluation     Patient summary reviewed and Nursing notes reviewed                Airway   Mallampati: II  Dental      Pulmonary - negative pulmonary ROS   Cardiovascular     (+) hypertension,       Neuro/Psych- negative ROS  GI/Hepatic/Renal/Endo    (+) morbid obesity,      Musculoskeletal (-) negative ROS    Abdominal    Substance History - negative use     OB/GYN negative ob/gyn ROS         Other                        Anesthesia Plan    ASA 3     general

## 2021-01-19 DIAGNOSIS — Z01.812 BLOOD TESTS PRIOR TO TREATMENT OR PROCEDURE: Primary | ICD-10-CM

## 2021-01-20 ENCOUNTER — LAB (OUTPATIENT)
Dept: PULMONOLOGY | Facility: CLINIC | Age: 61
End: 2021-01-20

## 2021-01-20 DIAGNOSIS — Z01.812 BLOOD TESTS PRIOR TO TREATMENT OR PROCEDURE: ICD-10-CM

## 2021-01-20 PROCEDURE — U0004 COV-19 TEST NON-CDC HGH THRU: HCPCS | Performed by: NURSE PRACTITIONER

## 2021-01-20 PROCEDURE — 99211 OFF/OP EST MAY X REQ PHY/QHP: CPT | Performed by: NURSE PRACTITIONER

## 2021-01-21 LAB — SARS-COV-2 RNA RESP QL NAA+PROBE: NOT DETECTED

## 2021-01-22 ENCOUNTER — OFFICE VISIT (OUTPATIENT)
Dept: PULMONOLOGY | Facility: CLINIC | Age: 61
End: 2021-01-22

## 2021-01-22 VITALS
SYSTOLIC BLOOD PRESSURE: 132 MMHG | HEIGHT: 74 IN | OXYGEN SATURATION: 93 % | BODY MASS INDEX: 40.43 KG/M2 | HEART RATE: 70 BPM | DIASTOLIC BLOOD PRESSURE: 90 MMHG | RESPIRATION RATE: 16 BRPM | TEMPERATURE: 96.6 F | WEIGHT: 315 LBS

## 2021-01-22 DIAGNOSIS — G47.33 SEVERE OBSTRUCTIVE SLEEP APNEA: ICD-10-CM

## 2021-01-22 DIAGNOSIS — R05.3 CHRONIC COUGH: Primary | ICD-10-CM

## 2021-01-22 DIAGNOSIS — R06.02 SHORTNESS OF BREATH: ICD-10-CM

## 2021-01-22 DIAGNOSIS — K21.9 GASTROESOPHAGEAL REFLUX DISEASE, UNSPECIFIED WHETHER ESOPHAGITIS PRESENT: ICD-10-CM

## 2021-01-22 PROCEDURE — 99214 OFFICE O/P EST MOD 30 MIN: CPT | Performed by: NURSE PRACTITIONER

## 2021-01-22 PROCEDURE — 94729 DIFFUSING CAPACITY: CPT | Performed by: NURSE PRACTITIONER

## 2021-01-22 PROCEDURE — 94375 RESPIRATORY FLOW VOLUME LOOP: CPT | Performed by: NURSE PRACTITIONER

## 2021-01-22 PROCEDURE — 94726 PLETHYSMOGRAPHY LUNG VOLUMES: CPT | Performed by: NURSE PRACTITIONER

## 2021-01-22 RX ORDER — ALBUTEROL SULFATE 90 UG/1
2 AEROSOL, METERED RESPIRATORY (INHALATION) EVERY 4 HOURS PRN
Qty: 18 G | Refills: 3 | Status: SHIPPED | OUTPATIENT
Start: 2021-01-22 | End: 2021-07-13 | Stop reason: SDUPTHER

## 2021-01-22 NOTE — PROGRESS NOTES
[facility], [place] Pulmonary Follow up    CHIEF COMPLAINT    Dyspnea    HISTORY OF PRESENT ILLNESS    Margarito Sosa is a 60 y.o.male here today for follow-up of his dyspnea.  He was last seen in the office by me in August.  He denies any respiratory illnesses or exacerbations since his last appointment.  He states he did have prostate surgery since his last appointment and has had a long recovery phase from this.    He continues to have shortness of breath with exertion.  He does have to take frequent breaks to recover.  He states he is fairly inactive at home.  He does have wheezing with exertion as well.  He has an albuterol inhaler that he uses and does need a refill.  He uses this 2-3 times a day.    He had a sleep study in December 2019 that showed severe obstructive sleep apnea.  He tried CPAP therapy but was intolerant.  I did refer him to be fitted for an oral appliance but he never followed through with the appointment.  He does have daytime somnolence, fatigue and nonrestorative sleep.    He denies fever, chills, sputum production, hemoptysis, night sweats, weight loss, chest pain or palpitations.  He denies any lower extremity edema or calf tenderness.  He denies any sinus or allergy symptoms.  He denies reflux symptoms.     He is up-to-date on his current vaccinations.    Patient Active Problem List   Diagnosis   • Gastroesophageal reflux disease   • Anxiety   • Dyslipidemia   • Essential hypertension   • Impaired glucose tolerance   • Cutaneous candidiasis   • Reactive depression   • Elevated LFTs   • Excessive sweating   • Genital HSV   • High risk sexual behavior   • Joint pain, knee   • Muscle cramps   • Onychomycosis of toenail   • Pain, wrist joint   • Seborrheic dermatitis of scalp   • Tinea pedis   • SVT (supraventricular tachycardia) (CMS/HCC)   • Class 3 obesity in adult   • Snoring   • Tubular adenoma of colon   • Severe obstructive sleep apnea intolerant to CPAP   • Shortness of breath        Allergies   Allergen Reactions   • Penicillins Hives       Current Outpatient Medications:   •  albuterol sulfate  (90 Base) MCG/ACT inhaler, Inhale 2 puffs Every 4 (Four) Hours As Needed for Wheezing., Disp: 18 g, Rfl: 3  •  ALPRAZolam (XANAX) 1 MG tablet, TAKE 1 TABLET BY MOUTH EVERY DAY (Patient taking differently: Take 1 mg by mouth As Needed.), Disp: 30 tablet, Rfl: 2  •  clotrimazole (LOTRIMIN) 1 % cream, Apply bid prn rash (Patient taking differently: Apply  topically to the appropriate area as directed As Needed. Apply bid prn rash), Disp: 14 g, Rfl: 1  •  cyclobenzaprine (FLEXERIL) 5 MG tablet, TAKE 1 TABLET BY MOUTH THREE TIMES DAILY AS NEEDED FOR MUSCLE SPASMS (Patient taking differently: Take 5 mg by mouth 3 (Three) Times a Day As Needed.), Disp: 90 tablet, Rfl: 0  •  fexofenadine (ALLEGRA) 60 MG tablet, Take 180 mg by mouth Daily., Disp: , Rfl:   •  HYDROcodone-acetaminophen (NORCO) 5-325 MG per tablet, Take 1 tablet by mouth Every 6 (Six) Hours As Needed for Moderate Pain ., Disp: 20 tablet, Rfl: 0  •  ketoconazole (NIZORAL) 2 % shampoo, APPLY 1 APPLICATION ON THE SKIN EVERY OTHER DAY, Disp: , Rfl: 11  •  lisinopril (PRINIVIL,ZESTRIL) 40 MG tablet, Take 40 mg by mouth Daily., Disp: , Rfl:   •  metoprolol tartrate (LOPRESSOR) 50 MG tablet, TAKE 1 TABLET BY MOUTH TWICE A DAY (Patient taking differently: Take 50 mg by mouth 2 (Two) Times a Day.), Disp: 180 tablet, Rfl: 2  •  omeprazole (priLOSEC) 20 MG capsule, TAKE 1 CAPSULE BY MOUTH EVERY DAY (Patient taking differently: Take 20 mg by mouth Daily.), Disp: 30 capsule, Rfl: 0  •  sildenafil (REVATIO) 20 MG tablet, Take 20 mg by mouth 4 (Four) Times a Day., Disp: , Rfl:   MEDICATION LIST AND ALLERGIES REVIEWED.    Social History     Tobacco Use   • Smoking status: Former Smoker     Packs/day: 1.00     Years: 20.00     Pack years: 20.00     Quit date:      Years since quittin.0   • Smokeless tobacco: Never Used   Substance Use Topics  "  • Alcohol use: No   • Drug use: No       FAMILY AND SOCIAL HISTORY REVIEWED.    Review of Systems   Constitutional: Negative for activity change, appetite change, fatigue, fever and unexpected weight change.   HENT: Negative for congestion, postnasal drip, rhinorrhea, sinus pressure, sore throat and voice change.    Eyes: Negative for visual disturbance.   Respiratory: Positive for shortness of breath. Negative for cough, chest tightness and wheezing.    Cardiovascular: Negative for chest pain, palpitations and leg swelling.   Gastrointestinal: Negative for abdominal distention, abdominal pain, nausea and vomiting.   Endocrine: Negative for cold intolerance and heat intolerance.   Genitourinary: Negative for difficulty urinating and urgency.   Musculoskeletal: Negative for arthralgias, back pain and neck pain.   Skin: Negative for color change and pallor.   Allergic/Immunologic: Negative for environmental allergies and food allergies.   Neurological: Negative for dizziness, syncope, weakness and light-headedness.   Hematological: Negative for adenopathy. Does not bruise/bleed easily.   Psychiatric/Behavioral: Negative for agitation and behavioral problems.   .    /90 (BP Location: Left arm, Patient Position: Sitting, Cuff Size: Adult)   Pulse 70   Temp 96.6 °F (35.9 °C)   Resp 16   Ht 188 cm (74\")   Wt (!) 166 kg (365 lb)   SpO2 93% Comment: room air at rest  BMI 46.86 kg/m²     Immunization History   Administered Date(s) Administered   • Flu Mist 11/25/2015   • Flu Vaccine Quad PF >18YRS 10/23/2017   • Hepatitis A 05/31/2018, 11/30/2018   • Influenza, Unspecified 10/12/2020   • MMR 07/14/1982   • Tdap 09/18/2012   • Varicella 07/14/1982   • Zostavax 06/04/2018   • flucelvax quad pfs =>4 YRS 10/15/2018   • influenza Split 11/04/2016       Physical Exam  Vitals signs and nursing note reviewed.   Constitutional:       Appearance: He is well-developed. He is obese. He is not diaphoretic.   HENT:      " Head: Normocephalic and atraumatic.   Eyes:      Pupils: Pupils are equal, round, and reactive to light.   Neck:      Musculoskeletal: Normal range of motion and neck supple.      Thyroid: No thyromegaly.   Cardiovascular:      Rate and Rhythm: Normal rate and regular rhythm.      Heart sounds: Normal heart sounds. No murmur. No friction rub. No gallop.    Pulmonary:      Effort: Pulmonary effort is normal. No respiratory distress.      Breath sounds: Normal breath sounds. No wheezing or rales.   Chest:      Chest wall: No tenderness.   Abdominal:      General: Bowel sounds are normal.      Palpations: Abdomen is soft.      Tenderness: There is no abdominal tenderness.   Musculoskeletal: Normal range of motion.         General: No swelling.   Lymphadenopathy:      Cervical: No cervical adenopathy.   Skin:     General: Skin is warm and dry.      Capillary Refill: Capillary refill takes less than 2 seconds.   Neurological:      Mental Status: He is alert and oriented to person, place, and time.   Psychiatric:         Mood and Affect: Mood normal.         Behavior: Behavior normal.           RESULTS    PFTS in the office today, read by me.  FVC 3.59 65% predicted, FEV1 2.63 63% predicted, FEV1/FVC 73% predicted, TLC 6.23 81% predicted, DLCO 77% predicted, mild obstruction, no restriction and reduced DLCO.    PROBLEM LIST    Problem List Items Addressed This Visit        Other    Gastroesophageal reflux disease    Overview     Impression: 10/05/2015 - stable  Impression: 02/09/2015 - stable;          Severe obstructive sleep apnea intolerant to CPAP    Shortness of breath    Relevant Medications    albuterol sulfate  (90 Base) MCG/ACT inhaler    Other Relevant Orders    Adult Transthoracic Echo Complete w/ Color, Spectral and Contrast if necessary per protocol      Other Visit Diagnoses     Chronic cough    -  Primary    Relevant Orders    Pulmonary Function Test            DISCUSSION    Mr. Sosa was here for  follow-up of his dyspnea.  We did review his PFTs in detail today he has a mild obstructive airway pattern.  I did give him some samples of Stiolto to try daily for the next couple of weeks.  Hopefully he will notice an improvement in his breathing when using this inhaler.  Did encourage him to use his albuterol as needed for shortness of breath.    We did discuss that his severe sleep apnea is currently not treated and he will need to consider treating this to help with his dyspnea.  He would like to try the inhaler first and if he does not notice an improvement in his breathing he will proceed with another home sleep study and try CPAP therapy again.    He will continue omeprazole daily for GERD.  We also discussed reflux precautions in the office today.    He will follow-up in 6 months or sooner if his symptoms worsen.  He will call with any additional concerns or questions.    Level of service justified based on 30 minutes spent in patient care on this date of service including, but not limited to: preparing to see the patient, obtaining and/or reviewing history, performing medically appropriate examination, ordering tests/medicine/procedures, independently interpreting results, documenting clinical information in EHR, and counseling/education of patient/family/caregiver. (Level 4 30-39 minutes; Level 5 40-54 minutes)      Keyonna Patel, APRN  01/22/202115:10 EST  Electronically signed     Please note that portions of this note were completed with a voice recognition program. Efforts were made to edit the dictations, but occasionally words are mistranscribed.      CC: Davon Quinn MD

## 2021-02-05 ENCOUNTER — HOSPITAL ENCOUNTER (OUTPATIENT)
Dept: CARDIOLOGY | Facility: HOSPITAL | Age: 61
Discharge: HOME | End: 2021-02-05
Admitting: NURSE PRACTITIONER

## 2021-02-05 DIAGNOSIS — R06.02 SHORTNESS OF BREATH: ICD-10-CM

## 2021-02-05 LAB
BH CV ECHO MEAS - % IVS THICK: 73.9 %
BH CV ECHO MEAS - % LVPW THICK: 21.6 %
BH CV ECHO MEAS - AO ACC TIME: 0.26 SEC
BH CV ECHO MEAS - AO MAX PG (FULL): 5.2 MMHG
BH CV ECHO MEAS - AO MAX PG: 9 MMHG
BH CV ECHO MEAS - AO MEAN PG (FULL): 3 MMHG
BH CV ECHO MEAS - AO MEAN PG: 5 MMHG
BH CV ECHO MEAS - AO ROOT AREA (BSA CORRECTED): 1.3
BH CV ECHO MEAS - AO ROOT AREA: 10.4 CM^2
BH CV ECHO MEAS - AO ROOT DIAM: 3.6 CM
BH CV ECHO MEAS - AO V2 MAX: 148 CM/SEC
BH CV ECHO MEAS - AO V2 MEAN: 101 CM/SEC
BH CV ECHO MEAS - AO V2 VTI: 33 CM
BH CV ECHO MEAS - ASC AORTA: 3.8 CM
BH CV ECHO MEAS - AVA(I,A): 3.4 CM^2
BH CV ECHO MEAS - AVA(I,D): 3.4 CM^2
BH CV ECHO MEAS - AVA(V,A): 3 CM^2
BH CV ECHO MEAS - AVA(V,D): 3 CM^2
BH CV ECHO MEAS - BSA(HAYCOCK): 3 M^2
BH CV ECHO MEAS - BSA: 2.8 M^2
BH CV ECHO MEAS - BZI_BMI: 46.9 KILOGRAMS/M^2
BH CV ECHO MEAS - BZI_METRIC_HEIGHT: 188 CM
BH CV ECHO MEAS - BZI_METRIC_WEIGHT: 165.6 KG
BH CV ECHO MEAS - EDV(CUBED): 140.8 ML
BH CV ECHO MEAS - EDV(MOD-SP2): 174 ML
BH CV ECHO MEAS - EDV(MOD-SP4): 194 ML
BH CV ECHO MEAS - EDV(TEICH): 129.7 ML
BH CV ECHO MEAS - EF(CUBED): 58.8 %
BH CV ECHO MEAS - EF(MOD-BP): 55.8 %
BH CV ECHO MEAS - EF(MOD-SP2): 51.3 %
BH CV ECHO MEAS - EF(MOD-SP4): 61.6 %
BH CV ECHO MEAS - EF(TEICH): 50.1 %
BH CV ECHO MEAS - ESV(CUBED): 58 ML
BH CV ECHO MEAS - ESV(MOD-SP2): 84.7 ML
BH CV ECHO MEAS - ESV(MOD-SP4): 74.4 ML
BH CV ECHO MEAS - ESV(TEICH): 64.7 ML
BH CV ECHO MEAS - FS: 25.6 %
BH CV ECHO MEAS - IVS/LVPW: 0.7
BH CV ECHO MEAS - IVSD: 1.2 CM
BH CV ECHO MEAS - IVSS: 2.1 CM
BH CV ECHO MEAS - LA DIMENSION: 4.1 CM
BH CV ECHO MEAS - LA/AO: 1.1
BH CV ECHO MEAS - LAD MAJOR: 6.6 CM
BH CV ECHO MEAS - LAT PEAK E' VEL: 11.4 CM/SEC
BH CV ECHO MEAS - LATERAL E/E' RATIO: 8
BH CV ECHO MEAS - LV DIASTOLIC VOL/BSA (35-75): 69.1 ML/M^2
BH CV ECHO MEAS - LV MASS(C)D: 326 GRAMS
BH CV ECHO MEAS - LV MASS(C)DI: 116.2 GRAMS/M^2
BH CV ECHO MEAS - LV MASS(C)S: 381.6 GRAMS
BH CV ECHO MEAS - LV MASS(C)SI: 136 GRAMS/M^2
BH CV ECHO MEAS - LV MAX PG: 3.8 MMHG
BH CV ECHO MEAS - LV MEAN PG: 2 MMHG
BH CV ECHO MEAS - LV SYSTOLIC VOL/BSA (12-30): 26.5 ML/M^2
BH CV ECHO MEAS - LV V1 MAX: 97.5 CM/SEC
BH CV ECHO MEAS - LV V1 MEAN: 58.9 CM/SEC
BH CV ECHO MEAS - LV V1 VTI: 25.2 CM
BH CV ECHO MEAS - LVIDD: 5.2 CM
BH CV ECHO MEAS - LVIDS: 3.9 CM
BH CV ECHO MEAS - LVLD AP2: 9.6 CM
BH CV ECHO MEAS - LVLD AP4: 10 CM
BH CV ECHO MEAS - LVLS AP2: 7.8 CM
BH CV ECHO MEAS - LVLS AP4: 8.7 CM
BH CV ECHO MEAS - LVOT AREA (M): 4.5 CM^2
BH CV ECHO MEAS - LVOT AREA: 4.5 CM^2
BH CV ECHO MEAS - LVOT DIAM: 2.4 CM
BH CV ECHO MEAS - LVPWD: 1.2 CM
BH CV ECHO MEAS - LVPWS: 2.1 CM
BH CV ECHO MEAS - MED PEAK E' VEL: 17.7 CM/SEC
BH CV ECHO MEAS - MEDIAL E/E' RATIO: 5.2
BH CV ECHO MEAS - MV A MAX VEL: 87.8 CM/SEC
BH CV ECHO MEAS - MV DEC TIME: 0.25 SEC
BH CV ECHO MEAS - MV E MAX VEL: 91.2 CM/SEC
BH CV ECHO MEAS - MV E/A: 1
BH CV ECHO MEAS - MV MAX PG: 4.2 MMHG
BH CV ECHO MEAS - MV MEAN PG: 2 MMHG
BH CV ECHO MEAS - MV V2 MAX: 102 CM/SEC
BH CV ECHO MEAS - MV V2 MEAN: 60.5 CM/SEC
BH CV ECHO MEAS - MV V2 VTI: 34.2 CM
BH CV ECHO MEAS - MVA(VTI): 3.3 CM^2
BH CV ECHO MEAS - PA ACC TIME: 0.16 SEC
BH CV ECHO MEAS - PA MAX PG (FULL): 1.8 MMHG
BH CV ECHO MEAS - PA MAX PG: 3.7 MMHG
BH CV ECHO MEAS - PA MEAN PG (FULL): 1 MMHG
BH CV ECHO MEAS - PA MEAN PG: 2 MMHG
BH CV ECHO MEAS - PA PR(ACCEL): 6.1 MMHG
BH CV ECHO MEAS - PA V2 MAX: 96.1 CM/SEC
BH CV ECHO MEAS - PA V2 MEAN: 66.5 CM/SEC
BH CV ECHO MEAS - PA V2 VTI: 20.1 CM
BH CV ECHO MEAS - PULM A REVS DUR: 0.13 SEC
BH CV ECHO MEAS - PULM A REVS VEL: 26.5 CM/SEC
BH CV ECHO MEAS - PULM DIAS VEL: 38.1 CM/SEC
BH CV ECHO MEAS - PULM S/D: 1.9
BH CV ECHO MEAS - PULM SYS VEL: 73.4 CM/SEC
BH CV ECHO MEAS - RAP SYSTOLE: 8 MMHG
BH CV ECHO MEAS - RV MAX PG: 1.9 MMHG
BH CV ECHO MEAS - RV MEAN PG: 1 MMHG
BH CV ECHO MEAS - RV V1 MAX: 69.5 CM/SEC
BH CV ECHO MEAS - RV V1 MEAN: 45.6 CM/SEC
BH CV ECHO MEAS - RV V1 VTI: 18.2 CM
BH CV ECHO MEAS - RVSP: 13 MMHG
BH CV ECHO MEAS - SI(AO): 122.4 ML/M^2
BH CV ECHO MEAS - SI(CUBED): 29.5 ML/M^2
BH CV ECHO MEAS - SI(LVOT): 40.3 ML/M^2
BH CV ECHO MEAS - SI(MOD-SP2): 31.8 ML/M^2
BH CV ECHO MEAS - SI(MOD-SP4): 42.6 ML/M^2
BH CV ECHO MEAS - SI(TEICH): 23.1 ML/M^2
BH CV ECHO MEAS - SV(AO): 343.4 ML
BH CV ECHO MEAS - SV(CUBED): 82.9 ML
BH CV ECHO MEAS - SV(LVOT): 113.1 ML
BH CV ECHO MEAS - SV(MOD-SP2): 89.3 ML
BH CV ECHO MEAS - SV(MOD-SP4): 119.6 ML
BH CV ECHO MEAS - SV(TEICH): 64.9 ML
BH CV ECHO MEAS - TAPSE (>1.6): 2.9 CM
BH CV ECHO MEAS - TR MAX PG: 5 MMHG
BH CV ECHO MEAS - TR MAX VEL: 112.5 CM/SEC
BH CV ECHO MEASUREMENTS AVERAGE E/E' RATIO: 6.27
BH CV XLRA - RV BASE: 3.7 CM
BH CV XLRA - RV LENGTH: 8.8 CM
BH CV XLRA - RV MID: 3.5 CM
BH CV XLRA - TDI S': 16.2 CM/SEC
LEFT ATRIUM VOLUME INDEX: 37.4 ML/M^2
LEFT ATRIUM VOLUME: 105 ML
LV EF 2D ECHO EST: 56 %
MAXIMAL PREDICTED HEART RATE: 162 BPM
STRESS TARGET HR: 138 BPM

## 2021-02-05 PROCEDURE — 93306 TTE W/DOPPLER COMPLETE: CPT

## 2021-02-05 PROCEDURE — 93306 TTE W/DOPPLER COMPLETE: CPT | Performed by: INTERNAL MEDICINE

## 2021-03-12 ENCOUNTER — OFFICE VISIT (OUTPATIENT)
Dept: PULMONOLOGY | Facility: CLINIC | Age: 61
End: 2021-03-12

## 2021-03-12 VITALS
OXYGEN SATURATION: 93 % | HEIGHT: 74 IN | WEIGHT: 315 LBS | DIASTOLIC BLOOD PRESSURE: 90 MMHG | BODY MASS INDEX: 40.43 KG/M2 | HEART RATE: 79 BPM | SYSTOLIC BLOOD PRESSURE: 162 MMHG | TEMPERATURE: 97.1 F

## 2021-03-12 DIAGNOSIS — R04.2 HEMOPTYSIS: Primary | ICD-10-CM

## 2021-03-12 DIAGNOSIS — G47.33 SEVERE OBSTRUCTIVE SLEEP APNEA: ICD-10-CM

## 2021-03-12 DIAGNOSIS — R06.02 SHORTNESS OF BREATH: ICD-10-CM

## 2021-03-12 DIAGNOSIS — K21.9 GASTROESOPHAGEAL REFLUX DISEASE, UNSPECIFIED WHETHER ESOPHAGITIS PRESENT: ICD-10-CM

## 2021-03-12 PROCEDURE — 99214 OFFICE O/P EST MOD 30 MIN: CPT | Performed by: NURSE PRACTITIONER

## 2021-03-12 RX ORDER — PREDNISONE 10 MG/1
TABLET ORAL
Qty: 30 TABLET | Refills: 0 | Status: SHIPPED | OUTPATIENT
Start: 2021-03-12 | End: 2021-04-05

## 2021-03-12 RX ORDER — DOXYCYCLINE HYCLATE 100 MG
100 TABLET ORAL 2 TIMES DAILY
Qty: 20 TABLET | Refills: 0 | Status: SHIPPED | OUTPATIENT
Start: 2021-03-12 | End: 2021-04-05

## 2021-03-12 NOTE — PROGRESS NOTES
[facility] Pulmonary Follow up    CHIEF COMPLAINT    Hemoptysis    HISTORY OF PRESENT ILLNESS    Margarito Sosa is a 60 y.o.male here today for hemoptysis.  He states that over the last 3 weeks he has had 2 episodes of hemoptysis.  He states that it has been less than 1 teaspoon amount and has been dark brown maroonish in color.  He did follow-up with ENT and was told that he possibly could have ruptured a blood vessel and was told to follow-up in our office for a possible bronchoscopy.    He continues to use his albuterol as needed for shortness of breath.  He was prescribed Atrovent and states that this was expensive but he has been using it.  He continues to have shortness of breath with any exertion.  He does have to take frequent breaks to recover.    He continues to have daytime somnolence, fatigue and nonrestorative sleep.  He has a history of RIGO but has been intolerant of CPAP.    He denies a fever, chills, weight loss, chest pain or palpitations.  He denies any lower extremity edema or calf tenderness.  He has been coughing up thick secretions every morning usually.  He does cough aggressively in the mornings.  He denies any sinus or allergy symptoms.  He continues to take omeprazole daily and denies reflux symptoms.    He is up-to-date on his current vaccinations.    Patient Active Problem List   Diagnosis   • Gastroesophageal reflux disease   • Anxiety   • Dyslipidemia   • Essential hypertension   • Impaired glucose tolerance   • Cutaneous candidiasis   • Reactive depression   • Elevated LFTs   • Excessive sweating   • Genital HSV   • High risk sexual behavior   • Joint pain, knee   • Muscle cramps   • Onychomycosis of toenail   • Pain, wrist joint   • Seborrheic dermatitis of scalp   • Tinea pedis   • SVT (supraventricular tachycardia) (CMS/HCC)   • Class 3 obesity in adult   • Snoring   • Tubular adenoma of colon   • Severe obstructive sleep apnea intolerant to CPAP   • Shortness of breath        Allergies   Allergen Reactions   • Penicillins Hives       Current Outpatient Medications:   •  albuterol sulfate  (90 Base) MCG/ACT inhaler, Inhale 2 puffs Every 4 (Four) Hours As Needed for Wheezing., Disp: 18 g, Rfl: 3  •  ALPRAZolam (XANAX) 1 MG tablet, TAKE 1 TABLET BY MOUTH EVERY DAY (Patient taking differently: Take 1 mg by mouth As Needed.), Disp: 30 tablet, Rfl: 2  •  clotrimazole (LOTRIMIN) 1 % cream, Apply bid prn rash (Patient taking differently: Apply  topically to the appropriate area as directed As Needed. Apply bid prn rash), Disp: 14 g, Rfl: 1  •  cyclobenzaprine (FLEXERIL) 5 MG tablet, TAKE 1 TABLET BY MOUTH THREE TIMES DAILY AS NEEDED FOR MUSCLE SPASMS (Patient taking differently: Take 5 mg by mouth 3 (Three) Times a Day As Needed.), Disp: 90 tablet, Rfl: 0  •  fexofenadine (ALLEGRA) 60 MG tablet, Take 180 mg by mouth Daily., Disp: , Rfl:   •  ketoconazole (NIZORAL) 2 % shampoo, APPLY 1 APPLICATION ON THE SKIN EVERY OTHER DAY, Disp: , Rfl: 11  •  lisinopril (PRINIVIL,ZESTRIL) 40 MG tablet, Take 40 mg by mouth Daily., Disp: , Rfl:   •  metoprolol tartrate (LOPRESSOR) 50 MG tablet, TAKE 1 TABLET BY MOUTH TWICE A DAY (Patient taking differently: Take 50 mg by mouth 2 (Two) Times a Day.), Disp: 180 tablet, Rfl: 2  •  omeprazole (priLOSEC) 20 MG capsule, TAKE 1 CAPSULE BY MOUTH EVERY DAY (Patient taking differently: Take 20 mg by mouth Daily.), Disp: 30 capsule, Rfl: 0  •  sildenafil (REVATIO) 20 MG tablet, Take 20 mg by mouth 4 (Four) Times a Day., Disp: , Rfl:   •  doxycycline (VIBRAMYICN) 100 MG tablet, Take 1 tablet by mouth 2 (Two) Times a Day., Disp: 20 tablet, Rfl: 0  •  predniSONE (DELTASONE) 10 MG tablet, Take 4 tabs daily x 3 days, then take 3 tabs daily x 3 days, then take 2 tabs daily x 3 days, then take 1 tab daily x 3 days, Disp: 30 tablet, Rfl: 0  MEDICATION LIST AND ALLERGIES REVIEWED.    Social History     Tobacco Use   • Smoking status: Former Smoker     Packs/day: 1.00  "    Years: 20.00     Pack years: 20.00     Quit date:      Years since quittin.2   • Smokeless tobacco: Never Used   Vaping Use   • Vaping Use: Never used   Substance Use Topics   • Alcohol use: No   • Drug use: No       FAMILY AND SOCIAL HISTORY REVIEWED.    Review of Systems   Constitutional: Positive for fatigue. Negative for activity change, appetite change, fever and unexpected weight change.   HENT: Positive for congestion. Negative for postnasal drip, rhinorrhea, sinus pressure, sore throat and voice change.    Eyes: Negative for visual disturbance.   Respiratory: Positive for cough and shortness of breath. Negative for chest tightness and wheezing.    Cardiovascular: Negative for chest pain, palpitations and leg swelling.   Gastrointestinal: Negative for abdominal distention, abdominal pain, nausea and vomiting.   Endocrine: Negative for cold intolerance and heat intolerance.   Genitourinary: Negative for difficulty urinating and urgency.   Musculoskeletal: Negative for arthralgias, back pain and neck pain.   Skin: Negative for color change and pallor.   Allergic/Immunologic: Negative for environmental allergies and food allergies.   Neurological: Negative for dizziness, syncope, weakness and light-headedness.   Hematological: Negative for adenopathy. Does not bruise/bleed easily.   Psychiatric/Behavioral: Negative for agitation and behavioral problems.   .    /90   Pulse 79   Temp 97.1 °F (36.2 °C)   Ht 188 cm (74\")   Wt (!) 165 kg (364 lb)   SpO2 93% Comment: resting at room air  BMI 46.73 kg/m²     Immunization History   Administered Date(s) Administered   • Flu Mist 2015   • Flu Vaccine Quad PF >18YRS 10/23/2017   • Hepatitis A 2018, 2018   • Influenza, Unspecified 10/12/2020   • MMR 1982   • Tdap 2012   • Varicella 1982   • Zostavax 2018   • flucelvax quad pfs =>4 YRS 10/15/2018   • influenza Split 2016       Physical Exam  Vitals and " nursing note reviewed.   Constitutional:       Appearance: He is well-developed. He is not diaphoretic.   HENT:      Head: Normocephalic and atraumatic.   Eyes:      Pupils: Pupils are equal, round, and reactive to light.   Neck:      Thyroid: No thyromegaly.   Cardiovascular:      Rate and Rhythm: Normal rate and regular rhythm.      Heart sounds: Normal heart sounds. No murmur. No friction rub. No gallop.    Pulmonary:      Effort: Pulmonary effort is normal. No respiratory distress.      Breath sounds: Normal breath sounds. No wheezing or rales.   Chest:      Chest wall: No tenderness.   Abdominal:      General: Bowel sounds are normal.      Palpations: Abdomen is soft.      Tenderness: There is no abdominal tenderness.   Musculoskeletal:         General: No swelling. Normal range of motion.      Cervical back: Normal range of motion and neck supple.   Lymphadenopathy:      Cervical: No cervical adenopathy.   Skin:     General: Skin is warm and dry.      Capillary Refill: Capillary refill takes less than 2 seconds.   Neurological:      Mental Status: He is alert and oriented to person, place, and time.   Psychiatric:         Mood and Affect: Mood normal.         Behavior: Behavior normal.           RESULTS    Chest PA/lateral: Official report pending    PROBLEM LIST    Problem List Items Addressed This Visit        Gastrointestinal Abdominal     Gastroesophageal reflux disease    Overview     Impression: 10/05/2015 - stable  Impression: 02/09/2015 - stable;             Pulmonary and Pneumonias    Shortness of breath    Relevant Orders    CT Chest Without Contrast       Sleep    Severe obstructive sleep apnea intolerant to CPAP      Other Visit Diagnoses     Hemoptysis    -  Primary    Relevant Medications    doxycycline (VIBRAMYICN) 100 MG tablet    predniSONE (DELTASONE) 10 MG tablet    Other Relevant Orders    XR Chest PA & Lateral    CT Chest Without Contrast            DISCUSSION    Mr. Sosa was here for  hemoptysis.  He states that his last episode was about a week ago.  He did not have any bright red in color it was mostly maroon.  He does not appear to be infected in the office today but I will treat for an acute bronchitis.  I did call in antibiotics and prednisone for him to complete.  We also reviewed his chest x-ray in the office today and it appears to have no acute pulmonary process however the official report is pending.    I would like to order a CT scan of the chest to further evaluate his lungs.  If there are any abnormalities in his CT scan he may possibly benefit from a bronchoscopy but this is on the low end of the differential.    I did encourage him to use Mucinex twice a day for secretion clearance.  It does sound like he does cough very forcefully every morning and he could have had some irritation in his airways that caused the hemoptysis.  I did advise him that if the hemoptysis were to worsen or become bright red in color that he needs to present to the ED for further evaluation.    We did discuss again doing another sleep study to requalify him and try to set up with a CPAP but he is going to think about this.    He will follow-up in 2 to 4 weeks with Dr. Haddad or one of the other physicians in the office.  I did advise him to call with any additional concerns or questions.  Level of service justified based on 35 minutes spent in patient care on this date of service including, but not limited to: preparing to see the patient, obtaining and/or reviewing history, performing medically appropriate examination, ordering tests/medicine/procedures, independently interpreting results, documenting clinical information in EHR, and counseling/education of patient/family/caregiver. (Level 4 30-39 minutes; Level 5 40-54 minutes)      LISSA Ferris  03/12/202111:11 EST  Electronically signed     Please note that portions of this note were completed with a voice recognition program. Efforts were  made to edit the dictations, but occasionally words are mistranscribed.      CC: Davon Quinn MD

## 2021-03-24 ENCOUNTER — HOSPITAL ENCOUNTER (OUTPATIENT)
Dept: CT IMAGING | Facility: HOSPITAL | Age: 61
Discharge: HOME | End: 2021-03-24
Admitting: NURSE PRACTITIONER

## 2021-03-24 DIAGNOSIS — R04.2 HEMOPTYSIS: ICD-10-CM

## 2021-03-24 DIAGNOSIS — R06.02 SHORTNESS OF BREATH: ICD-10-CM

## 2021-03-24 PROCEDURE — 71250 CT THORAX DX C-: CPT

## 2021-04-05 ENCOUNTER — OFFICE VISIT (OUTPATIENT)
Dept: PULMONOLOGY | Facility: CLINIC | Age: 61
End: 2021-04-05

## 2021-04-05 VITALS
DIASTOLIC BLOOD PRESSURE: 80 MMHG | HEIGHT: 74 IN | TEMPERATURE: 97.7 F | HEART RATE: 74 BPM | BODY MASS INDEX: 40.43 KG/M2 | OXYGEN SATURATION: 95 % | WEIGHT: 315 LBS | SYSTOLIC BLOOD PRESSURE: 130 MMHG

## 2021-04-05 DIAGNOSIS — B83.9 HELMINTH INFECTION: ICD-10-CM

## 2021-04-05 DIAGNOSIS — R06.02 SHORTNESS OF BREATH: ICD-10-CM

## 2021-04-05 DIAGNOSIS — G47.33 SEVERE OBSTRUCTIVE SLEEP APNEA: ICD-10-CM

## 2021-04-05 DIAGNOSIS — R04.2 HEMOPTYSIS: ICD-10-CM

## 2021-04-05 DIAGNOSIS — R05.3 CHRONIC COUGH: Primary | ICD-10-CM

## 2021-04-05 PROCEDURE — 86682 HELMINTH ANTIBODY: CPT | Performed by: INTERNAL MEDICINE

## 2021-04-05 PROCEDURE — 99214 OFFICE O/P EST MOD 30 MIN: CPT | Performed by: INTERNAL MEDICINE

## 2021-04-05 PROCEDURE — 36415 COLL VENOUS BLD VENIPUNCTURE: CPT | Performed by: INTERNAL MEDICINE

## 2021-04-05 RX ORDER — IVERMECTIN 3 MG/1
3 TABLET ORAL ONCE
Qty: 1 TABLET | Refills: 0 | Status: SHIPPED | OUTPATIENT
Start: 2021-04-05 | End: 2021-04-05

## 2021-04-05 NOTE — PROGRESS NOTES
Pulmonary Medicine Follow-up    Chief Complaint     Follow-up of cough with hemoptysis    History of Present Illness/History Review     Mr. Sosa returns the clinic today.  He was last seen by my nurse practitioner on 3/12/2021.  At that time he was having some episodes of expectoration of dark brown and maroon material.  It was thought that this could have been old blood.  At that time, he was placed on antibiotics as well as a prednisone taper.  He states that after several days of this he began to feel much better in terms of his shortness of breath and also has not coughed up any further dark material.  He states that he has recently been having a lot of exposures to very filthy plumbing systems as he is a .  He states in the past he has had pinworms and other helminth infections and he is concerned that these may be getting into his lungs.  Of note, he is only seen some worms in his stool.    I was able to review the CT scan of the chest which was performed on 3/24/2021.  The lungs are grossly clear.  No nodularity or masses noted.  Airways appear patent.  Incidentally noted is a right diaphragmatic hernia.    Mr. Sosa denies problems with fevers or chills.  He does state that he does continue to be short of breath with exertion.  He continues to take Atrovent 1 or 2 times daily as well as albuterol.  He states that these do help him quite a bit.  He would like to stay with these.    He is planning to get the Covid vaccine when available.    He does complain of some reflux but no dysphagia/odynophagia, or regurgitation.  He does state that he has since turned in his CPAP as he is not able to tolerate it.  He is being worked up for a dental appliance by report.      Review of Systems  Full review of systems has been completed and it is negative except as mentioned expressly in the HPI and the scanned review sheet.    Allergies   Allergen Reactions   • Penicillins Hives       Current Outpatient  Medications:   •  albuterol sulfate  (90 Base) MCG/ACT inhaler, Inhale 2 puffs Every 4 (Four) Hours As Needed for Wheezing., Disp: 18 g, Rfl: 3  •  ALPRAZolam (XANAX) 1 MG tablet, TAKE 1 TABLET BY MOUTH EVERY DAY (Patient taking differently: Take 1 mg by mouth As Needed.), Disp: 30 tablet, Rfl: 2  •  clotrimazole (LOTRIMIN) 1 % cream, Apply bid prn rash (Patient taking differently: Apply  topically to the appropriate area as directed As Needed. Apply bid prn rash), Disp: 14 g, Rfl: 1  •  cyclobenzaprine (FLEXERIL) 5 MG tablet, TAKE 1 TABLET BY MOUTH THREE TIMES DAILY AS NEEDED FOR MUSCLE SPASMS (Patient taking differently: Take 5 mg by mouth 3 (Three) Times a Day As Needed.), Disp: 90 tablet, Rfl: 0  •  fexofenadine (ALLEGRA) 60 MG tablet, Take 180 mg by mouth Daily., Disp: , Rfl:   •  ketoconazole (NIZORAL) 2 % shampoo, APPLY 1 APPLICATION ON THE SKIN EVERY OTHER DAY, Disp: , Rfl: 11  •  lisinopril (PRINIVIL,ZESTRIL) 40 MG tablet, Take 40 mg by mouth Daily., Disp: , Rfl:   •  metoprolol tartrate (LOPRESSOR) 50 MG tablet, TAKE 1 TABLET BY MOUTH TWICE A DAY (Patient taking differently: Take 50 mg by mouth 2 (Two) Times a Day.), Disp: 180 tablet, Rfl: 2  •  omeprazole (priLOSEC) 20 MG capsule, TAKE 1 CAPSULE BY MOUTH EVERY DAY (Patient taking differently: Take 20 mg by mouth Daily.), Disp: 30 capsule, Rfl: 0  •  sildenafil (REVATIO) 20 MG tablet, Take 20 mg by mouth 4 (Four) Times a Day., Disp: , Rfl:   •  ivermectin (STROMECTOL) 3 MG tablet tablet, Take 1 tablet by mouth 1 (One) Time for 1 dose., Disp: 1 tablet, Rfl: 0    Past Medical History:   Diagnosis Date   • Anxiety    • BPH (benign prostatic hyperplasia)    • Depression    • Generalized osteoarthritis    • H/O colonoscopy    • History of colon polyps     sigmoid colon - RESULT WAS PRE-CANCEROUS   • History of diverticulosis    • History of esophageal reflux    • History of hiatal hernia    • History of irritable bowel syndrome    • History of nicotine  "dependence    • History of osteoarthritis    • Hypertension    • Yennifer-implantitis, dental     LOWER   • Sleep apnea     DOES NOT USE CPAP   • Wears glasses     RX     Family History   Problem Relation Age of Onset   • Stroke Father    • Hypertension Father    • Diabetes Father    • Cancer Father    • Depression Other    • Osteoarthritis Other    • Cancer Other    • Lung cancer Other    • No Known Problems Mother    • Breast cancer Sister    • No Known Problems Maternal Grandmother    • Liver cancer Maternal Grandfather    • No Known Problems Paternal Grandmother    • No Known Problems Paternal Grandfather      Social History     Tobacco Use   • Smoking status: Former Smoker     Packs/day: 1.00     Years: 20.00     Pack years: 20.00     Quit date:      Years since quittin.2   • Smokeless tobacco: Never Used   Vaping Use   • Vaping Use: Never used   Substance Use Topics   • Alcohol use: No   • Drug use: No       /80   Pulse 74   Temp 97.7 °F (36.5 °C)   Ht 188 cm (74\")   Wt (!) 164 kg (361 lb)   SpO2 95% Comment: resting, room air  BMI 46.35 kg/m²   Physical Exam    Results     CT scan as per the HPI    Problem List       ICD-10-CM ICD-9-CM   1. Chronic cough  R05 786.2   2. Hemoptysis  R04.2 786.30   3. Shortness of breath  R06.02 786.05   4. Helminth infection, probable based upon history; enterobiasis versus strongyloidiasis  B83.9 128.9   5. Severe obstructive sleep apnea  G47.33 327.23       Impression and Plan     Mr. Sosa seems to be doing a bit better from a respiratory standpoint.  I would like him to continue on with his current inhaler regimen.  I did offer some long-acting medications for him particularly as a replacement for Atrovent which is quite expensive for him although at this point he would like to stay with his current regimen.    It does sound as though he had severe bronchitis a few weeks ago and this has improved.  I would not recommend bronchoscopy at this time however " if he were to develop further hemoptysis or worsened sputum production, I think that it would be reasonable to proceed with airway examination at that time.  There is no evidence of active infection on his current CT scan.    Due to his reports of passing worms in his stool and exposure to waste during his job as a , I think would be reasonable to go ahead and treat him for presumptive helminth infection.  I would like him to take 1 dose of ivermectin and we will go ahead and check a CBC as well as a strongyloidiasis serology screen.  I will go ahead and order these for him.    I will see him back in the clinic in about 3 months.  I have advised him however that if he were to develop any worsened respiratory issues, in particular hemoptysis, he should alert us immediately and we will proceed with further investigation at that time.    I spent a total of 30 minutes with Mr. Sosa with greater than 70% of that time spent in consultation regarding his diagnoses, review the data, and formulation of the plan moving forward.    Thank you very much for including me in the care of this very nice patient.    Emile Arteaga MD, [facility]    CC: Davon Quinn MD

## 2021-04-07 LAB
BASOPHILS # BLD AUTO: 0.03 10*3/MM3 (ref 0–0.2)
BASOPHILS NFR BLD AUTO: 0.5 % (ref 0–1.5)
EOSINOPHIL # BLD AUTO: 0.15 10*3/MM3 (ref 0–0.4)
EOSINOPHIL NFR BLD AUTO: 2.3 % (ref 0.3–6.2)
ERYTHROCYTE [DISTWIDTH] IN BLOOD BY AUTOMATED COUNT: 13 % (ref 12.3–15.4)
HCT VFR BLD AUTO: 46 % (ref 37.5–51)
HGB BLD-MCNC: 15.6 G/DL (ref 13–17.7)
IMM GRANULOCYTES # BLD AUTO: 0.02 10*3/MM3 (ref 0–0.05)
IMM GRANULOCYTES NFR BLD AUTO: 0.3 % (ref 0–0.5)
LYMPHOCYTES # BLD AUTO: 1.61 10*3/MM3 (ref 0.7–3.1)
LYMPHOCYTES NFR BLD AUTO: 24.7 % (ref 19.6–45.3)
MCH RBC QN AUTO: 30.4 PG (ref 26.6–33)
MCHC RBC AUTO-ENTMCNC: 33.9 G/DL (ref 31.5–35.7)
MCV RBC AUTO: 89.5 FL (ref 79–97)
MONOCYTES # BLD AUTO: 0.73 10*3/MM3 (ref 0.1–0.9)
MONOCYTES NFR BLD AUTO: 11.2 % (ref 5–12)
NEUTROPHILS # BLD AUTO: 3.98 10*3/MM3 (ref 1.7–7)
NEUTROPHILS NFR BLD AUTO: 61 % (ref 42.7–76)
NRBC BLD AUTO-RTO: 0 /100 WBC (ref 0–0.2)
PLATELET # BLD AUTO: 187 10*3/MM3 (ref 140–450)
RBC # BLD AUTO: 5.14 10*6/MM3 (ref 4.14–5.8)
WBC # BLD AUTO: 6.52 10*3/MM3 (ref 3.4–10.8)

## 2021-04-09 LAB — STRONGYLOIDES IGG SER QL IA: NEGATIVE

## 2021-04-13 ENCOUNTER — TELEPHONE (OUTPATIENT)
Dept: PULMONOLOGY | Facility: CLINIC | Age: 61
End: 2021-04-13

## 2021-07-13 ENCOUNTER — OFFICE VISIT (OUTPATIENT)
Dept: PULMONOLOGY | Facility: CLINIC | Age: 61
End: 2021-07-13

## 2021-07-13 VITALS
BODY MASS INDEX: 40.43 KG/M2 | TEMPERATURE: 97.3 F | OXYGEN SATURATION: 94 % | HEIGHT: 74 IN | WEIGHT: 315 LBS | DIASTOLIC BLOOD PRESSURE: 82 MMHG | SYSTOLIC BLOOD PRESSURE: 168 MMHG | HEART RATE: 93 BPM

## 2021-07-13 DIAGNOSIS — J44.1 COPD WITH ACUTE EXACERBATION (HCC): ICD-10-CM

## 2021-07-13 DIAGNOSIS — R06.02 SHORTNESS OF BREATH: ICD-10-CM

## 2021-07-13 DIAGNOSIS — K21.9 GASTROESOPHAGEAL REFLUX DISEASE, UNSPECIFIED WHETHER ESOPHAGITIS PRESENT: Primary | ICD-10-CM

## 2021-07-13 PROCEDURE — 99214 OFFICE O/P EST MOD 30 MIN: CPT | Performed by: NURSE PRACTITIONER

## 2021-07-13 RX ORDER — ALBUTEROL SULFATE 90 UG/1
2 AEROSOL, METERED RESPIRATORY (INHALATION) EVERY 4 HOURS PRN
Qty: 18 G | Refills: 3 | Status: SHIPPED | OUTPATIENT
Start: 2021-07-13 | End: 2021-11-15 | Stop reason: SDUPTHER

## 2021-07-13 RX ORDER — FLUTICASONE PROPIONATE AND SALMETEROL XINAFOATE 230; 21 UG/1; UG/1
2 AEROSOL, METERED RESPIRATORY (INHALATION)
Qty: 8 G | Refills: 11 | Status: SHIPPED | OUTPATIENT
Start: 2021-07-13 | End: 2021-11-15 | Stop reason: SDUPTHER

## 2021-07-13 RX ORDER — PREDNISONE 10 MG/1
TABLET ORAL
Qty: 31 TABLET | Refills: 0 | Status: SHIPPED | OUTPATIENT
Start: 2021-07-13 | End: 2021-11-15

## 2021-07-13 RX ORDER — DOXYCYCLINE HYCLATE 100 MG
100 TABLET ORAL 2 TIMES DAILY
Qty: 20 TABLET | Refills: 0 | Status: SHIPPED | OUTPATIENT
Start: 2021-07-13 | End: 2021-11-15

## 2021-07-13 NOTE — PROGRESS NOTES
[facility] Pulmonary Follow up    CHIEF COMPLAINT    Dyspnea    HISTORY OF PRESENT ILLNESS    Margarito Sosa is a 60 y.o.male here today for follow-up of his dyspnea. He was last seen in the office by Dr. Arteaga in April. I had treated him with a round of antibiotics and prednisone in March for a upper respiratory infection. He states that he is exposed to areas that are not very clean and does feel that he is getting  another infection. He states for the last week he has had worsening congestion and a productive cough. His sputum color is yellow to light green.    He continues to use his Atrovent couple times per day. Unfortunately he cannot afford any other inhalers and has not wanted to try any different inhalers.    He does have shortness of breath with any exertion. He does recover fairly quickly at rest.    He did have an oral appliance made and has to finish the process at home but has not yet started wearing this at night for his RIGO. He has been intolerant to CPAP in the past.    He denies fever, chills, sputum production, hemoptysis, night sweats, weight loss, chest pain or palpitations. He denies any lower extremity edema or calf tenderness. He does have chronic sinus and allergy symptoms. He does take Allegra daily. He denies reflux symptoms and does take omeprazole daily.    He is up-to-date on his current vaccinations.    He quit smoking in 2012 and has a 20-pack-year history.    Patient Active Problem List   Diagnosis   • Gastroesophageal reflux disease   • Anxiety   • Dyslipidemia   • Essential hypertension   • Impaired glucose tolerance   • Cutaneous candidiasis   • Reactive depression   • Elevated LFTs   • Excessive sweating   • Genital HSV   • High risk sexual behavior   • Joint pain, knee   • Muscle cramps   • Onychomycosis of toenail   • Pain, wrist joint   • Seborrheic dermatitis of scalp   • Tinea pedis   • SVT (supraventricular tachycardia) (CMS/HCC)   • Class 3 obesity in adult   •  Snoring   • Tubular adenoma of colon   • Severe obstructive sleep apnea intolerant to CPAP   • Shortness of breath       Allergies   Allergen Reactions   • Penicillins Hives       Current Outpatient Medications:   •  albuterol sulfate  (90 Base) MCG/ACT inhaler, Inhale 2 puffs Every 4 (Four) Hours As Needed for Wheezing., Disp: 18 g, Rfl: 3  •  ALPRAZolam (XANAX) 1 MG tablet, TAKE 1 TABLET BY MOUTH EVERY DAY (Patient taking differently: Take 1 mg by mouth As Needed.), Disp: 30 tablet, Rfl: 2  •  clotrimazole (LOTRIMIN) 1 % cream, Apply bid prn rash (Patient taking differently: Apply  topically to the appropriate area as directed As Needed. Apply bid prn rash), Disp: 14 g, Rfl: 1  •  cyclobenzaprine (FLEXERIL) 5 MG tablet, TAKE 1 TABLET BY MOUTH THREE TIMES DAILY AS NEEDED FOR MUSCLE SPASMS (Patient taking differently: Take 5 mg by mouth 3 (Three) Times a Day As Needed.), Disp: 90 tablet, Rfl: 0  •  fexofenadine (ALLEGRA) 60 MG tablet, Take 180 mg by mouth Daily., Disp: , Rfl:   •  ketoconazole (NIZORAL) 2 % shampoo, APPLY 1 APPLICATION ON THE SKIN EVERY OTHER DAY, Disp: , Rfl: 11  •  lisinopril (PRINIVIL,ZESTRIL) 40 MG tablet, Take 40 mg by mouth Daily., Disp: , Rfl:   •  metoprolol tartrate (LOPRESSOR) 50 MG tablet, TAKE 1 TABLET BY MOUTH TWICE A DAY (Patient taking differently: Take 50 mg by mouth 2 (Two) Times a Day.), Disp: 180 tablet, Rfl: 2  •  omeprazole (priLOSEC) 20 MG capsule, TAKE 1 CAPSULE BY MOUTH EVERY DAY (Patient taking differently: Take 20 mg by mouth Daily.), Disp: 30 capsule, Rfl: 0  •  sildenafil (REVATIO) 20 MG tablet, Take 20 mg by mouth 4 (Four) Times a Day., Disp: , Rfl:   •  doxycycline (VIBRAMYICN) 100 MG tablet, Take 1 tablet by mouth 2 (Two) Times a Day., Disp: 20 tablet, Rfl: 0  •  fluticasone-salmeterol (Advair HFA) 230-21 MCG/ACT inhaler, Inhale 2 puffs 2 (Two) Times a Day., Disp: 8 g, Rfl: 11  •  predniSONE (DELTASONE) 10 MG tablet, Take 4 tabs daily x 3 days, then take 3 tabs  "daily x 3 days, then take 2 tabs daily x 3 days, then take 1 tab daily x 3 days, Disp: 31 tablet, Rfl: 0  MEDICATION LIST AND ALLERGIES REVIEWED.    Social History     Tobacco Use   • Smoking status: Former Smoker     Packs/day: 1.00     Years: 20.00     Pack years: 20.00     Quit date:      Years since quittin.5   • Smokeless tobacco: Never Used   Vaping Use   • Vaping Use: Never used   Substance Use Topics   • Alcohol use: No   • Drug use: No       FAMILY AND SOCIAL HISTORY REVIEWED.    Review of Systems   Constitutional: Positive for fatigue. Negative for activity change, appetite change, fever and unexpected weight change.   HENT: Negative for congestion, postnasal drip, rhinorrhea, sinus pressure, sore throat and voice change.    Eyes: Negative for visual disturbance.   Respiratory: Positive for shortness of breath. Negative for cough, chest tightness and wheezing.    Cardiovascular: Negative for chest pain, palpitations and leg swelling.   Gastrointestinal: Negative for abdominal distention, abdominal pain, nausea and vomiting.   Endocrine: Negative for cold intolerance and heat intolerance.   Genitourinary: Negative for difficulty urinating and urgency.   Musculoskeletal: Negative for arthralgias, back pain and neck pain.   Skin: Negative for color change and pallor.   Allergic/Immunologic: Negative for environmental allergies and food allergies.   Neurological: Negative for dizziness, syncope, weakness and light-headedness.   Hematological: Negative for adenopathy. Does not bruise/bleed easily.   Psychiatric/Behavioral: Negative for agitation and behavioral problems.   .    /82   Pulse 93   Temp 97.3 °F (36.3 °C) (Temporal)   Ht 188 cm (74\")   Wt (!) 164 kg (362 lb)   SpO2 94% Comment: room air, resting  BMI 46.48 kg/m²     Immunization History   Administered Date(s) Administered   • Flu Mist 2015   • Flu Vaccine Quad PF >18YRS 10/23/2017   • Flu Vaccine Split Quad 10/16/2019, " 10/27/2020   • Hepatitis A 05/31/2018, 11/30/2018   • Influenza Quad Vaccine (Inpatient) 10/23/2017   • Influenza, Unspecified 10/12/2020   • MMR 07/14/1982   • Pneumococcal Conjugate 13-Valent (PCV13) 11/14/2019   • Pneumococcal Polysaccharide (PPSV23) 12/11/2020   • Tdap 09/18/2012   • Varicella 07/14/1982   • Zostavax 06/04/2018   • flucelvax quad pfs =>4 YRS 10/15/2018   • influenza Split 11/04/2016       Physical Exam  Vitals reviewed.   Constitutional:       Appearance: He is well-developed.   HENT:      Head: Normocephalic and atraumatic.   Eyes:      Pupils: Pupils are equal, round, and reactive to light.   Cardiovascular:      Rate and Rhythm: Normal rate and regular rhythm.      Heart sounds: Normal heart sounds.   Pulmonary:      Effort: Pulmonary effort is normal.      Breath sounds: Normal breath sounds. No wheezing or rales.   Chest:      Chest wall: No tenderness.   Abdominal:      General: Bowel sounds are normal.      Palpations: Abdomen is soft.   Musculoskeletal:         General: No swelling. Normal range of motion.      Cervical back: Normal range of motion and neck supple.   Skin:     General: Skin is warm and dry.      Capillary Refill: Capillary refill takes less than 2 seconds.   Neurological:      Mental Status: He is alert and oriented to person, place, and time.   Psychiatric:         Mood and Affect: Mood normal.         Behavior: Behavior normal.           RESULTS    PROBLEM LIST    Problem List Items Addressed This Visit        Gastrointestinal Abdominal     Gastroesophageal reflux disease - Primary    Overview     Impression: 10/05/2015 - stable  Impression: 02/09/2015 - stable;             Pulmonary and Pneumonias    Shortness of breath    Relevant Medications    albuterol sulfate  (90 Base) MCG/ACT inhaler      Other Visit Diagnoses     COPD with acute exacerbation (CMS/[facility])        Relevant Medications    fluticasone-salmeterol (Advair HFA) 230-21 MCG/ACT inhaler     doxycycline (VIBRAMYICN) 100 MG tablet    predniSONE (DELTASONE) 10 MG tablet    albuterol sulfate  (90 Base) MCG/ACT inhaler            DISCUSSION    Mr. Sosa was here for follow-up of his dyspnea. He is willing to try a different inhaler but wants an aerosol inhaler. I will call in Advair HFA to see if this will help with his breathing and is more affordable for him.    I will also call in an antibiotic and prednisone taper for a upper respiratory infection. Did encourage him to use this in its entirety. He will continue to use his albuterol rescue inhaler as needed for shortness of breath.    I did encourage him to start wearing his oral appliance tonight as this would help him feel better for his untreated RIGO. He states that he will try to wear it.    He will continue omeprazole daily for GERD. We also discussed reflux precautions in the office today.    I did encourage him to continue Allegra and add Flonase daily to his allergy regimen.    He will follow up in 4 months or sooner if his symptoms worsen. He will call with any additional concerns or questions.    Level of service justified based on 35 minutes spent in patient care on this date of service including, but not limited to: preparing to see the patient, obtaining and/or reviewing history, performing medically appropriate examination, ordering tests/medicine/procedures, independently interpreting results, documenting clinical information in EHR, and counseling/education of patient/family/caregiver. (Level 4 30-39 minutes; Level 5 40-54 minutes)      LISSA Ferris  07/13/202112:46 EDT  Electronically signed     Please note that portions of this note were completed with a voice recognition program. Efforts were made to edit the dictations, but occasionally words are mistranscribed.      CC: Davon Quinn MD

## 2021-10-05 ENCOUNTER — HOSPITAL ENCOUNTER (EMERGENCY)
Facility: HOSPITAL | Age: 61
Discharge: LEFT BEFORE DISCHARGE | End: 2021-10-05

## 2021-10-05 VITALS
HEIGHT: 74 IN | DIASTOLIC BLOOD PRESSURE: 131 MMHG | BODY MASS INDEX: 40.43 KG/M2 | HEART RATE: 84 BPM | RESPIRATION RATE: 18 BRPM | TEMPERATURE: 98.7 F | SYSTOLIC BLOOD PRESSURE: 194 MMHG | OXYGEN SATURATION: 94 % | WEIGHT: 315 LBS

## 2021-10-05 PROCEDURE — 99211 OFF/OP EST MAY X REQ PHY/QHP: CPT

## 2021-11-15 ENCOUNTER — OFFICE VISIT (OUTPATIENT)
Dept: PULMONOLOGY | Facility: CLINIC | Age: 61
End: 2021-11-15

## 2021-11-15 VITALS
SYSTOLIC BLOOD PRESSURE: 136 MMHG | BODY MASS INDEX: 40.43 KG/M2 | HEART RATE: 58 BPM | OXYGEN SATURATION: 98 % | WEIGHT: 315 LBS | DIASTOLIC BLOOD PRESSURE: 92 MMHG | TEMPERATURE: 97.5 F | HEIGHT: 74 IN

## 2021-11-15 DIAGNOSIS — G47.33 SEVERE OBSTRUCTIVE SLEEP APNEA: ICD-10-CM

## 2021-11-15 DIAGNOSIS — R06.02 SHORTNESS OF BREATH: ICD-10-CM

## 2021-11-15 DIAGNOSIS — J44.1 COPD WITH ACUTE EXACERBATION (HCC): ICD-10-CM

## 2021-11-15 DIAGNOSIS — K21.9 GASTROESOPHAGEAL REFLUX DISEASE, UNSPECIFIED WHETHER ESOPHAGITIS PRESENT: Primary | ICD-10-CM

## 2021-11-15 PROCEDURE — 99214 OFFICE O/P EST MOD 30 MIN: CPT | Performed by: NURSE PRACTITIONER

## 2021-11-15 RX ORDER — ALBUTEROL SULFATE 90 UG/1
2 AEROSOL, METERED RESPIRATORY (INHALATION) EVERY 4 HOURS PRN
Qty: 18 G | Refills: 3 | Status: SHIPPED | OUTPATIENT
Start: 2021-11-15 | End: 2022-08-25 | Stop reason: SDUPTHER

## 2021-11-15 RX ORDER — PREDNISONE 10 MG/1
TABLET ORAL
Qty: 31 TABLET | Refills: 0 | Status: SHIPPED | OUTPATIENT
Start: 2021-11-15 | End: 2022-08-25

## 2021-11-15 RX ORDER — FLUTICASONE PROPIONATE AND SALMETEROL XINAFOATE 230; 21 UG/1; UG/1
2 AEROSOL, METERED RESPIRATORY (INHALATION)
Qty: 8 G | Refills: 11 | Status: SHIPPED | OUTPATIENT
Start: 2021-11-15 | End: 2022-10-17

## 2021-11-15 RX ORDER — DOXYCYCLINE HYCLATE 100 MG
100 TABLET ORAL 2 TIMES DAILY
Qty: 20 TABLET | Refills: 0 | Status: SHIPPED | OUTPATIENT
Start: 2021-11-15 | End: 2022-08-25

## 2021-11-15 NOTE — PROGRESS NOTES
[facility] Pulmonary Follow up    CHIEF COMPLAINT    dyspnea    HISTORY OF PRESENT ILLNESS    Margarito Sosa is a 61 y.o.male here today for follow-up of his dyspnea.  He was last seen in the office in July by me.  He denies any recent illnesses or exacerbations since last appointment.  He has noticed he is coughing up more thick tan sputum over the last week.  He does feel that he is starting to have an exacerbation.  His last exacerbation was 6 months ago.    He continues to use his Advair twice a day.  He also has albuterol to use twice a day as well.    He continues to have dyspnea with any exertion.  He states that he is fairly inactive at home.    He continues to sleep poorly.  He does have daytime somnolence and fatigue.    He denies fever, chills, hemoptysis, night sweats, weight loss, chest pain or palpitations.  He denies any lower extremity edema or calf tenderness.  He denies any sinus or allergy symptoms.  He denies reflux symptoms.    He is up-to-date on his current vaccinations.    Patient Active Problem List   Diagnosis   • Gastroesophageal reflux disease   • Anxiety   • Dyslipidemia   • Essential hypertension   • Impaired glucose tolerance   • Cutaneous candidiasis   • Reactive depression   • Elevated LFTs   • Excessive sweating   • Genital HSV   • High risk sexual behavior   • Joint pain, knee   • Muscle cramps   • Onychomycosis of toenail   • Pain, wrist joint   • Seborrheic dermatitis of scalp   • Tinea pedis   • SVT (supraventricular tachycardia) (HCC)   • Class 3 obesity in adult   • Snoring   • Tubular adenoma of colon   • Severe obstructive sleep apnea intolerant to CPAP   • Shortness of breath       Allergies   Allergen Reactions   • Penicillins Hives       Current Outpatient Medications:   •  albuterol sulfate  (90 Base) MCG/ACT inhaler, Inhale 2 puffs Every 4 (Four) Hours As Needed for Wheezing., Disp: 18 g, Rfl: 3  •  ALPRAZolam (XANAX) 1 MG tablet, TAKE 1 TABLET BY MOUTH EVERY  DAY (Patient taking differently: Take 1 mg by mouth As Needed.), Disp: 30 tablet, Rfl: 2  •  clotrimazole (LOTRIMIN) 1 % cream, Apply bid prn rash (Patient taking differently: Apply  topically to the appropriate area as directed As Needed. Apply bid prn rash), Disp: 14 g, Rfl: 1  •  cyclobenzaprine (FLEXERIL) 5 MG tablet, TAKE 1 TABLET BY MOUTH THREE TIMES DAILY AS NEEDED FOR MUSCLE SPASMS (Patient taking differently: Take 5 mg by mouth 3 (Three) Times a Day As Needed.), Disp: 90 tablet, Rfl: 0  •  doxycycline (VIBRAMYICN) 100 MG tablet, Take 1 tablet by mouth 2 (Two) Times a Day., Disp: 20 tablet, Rfl: 0  •  fexofenadine (ALLEGRA) 60 MG tablet, Take 180 mg by mouth Daily., Disp: , Rfl:   •  fluticasone-salmeterol (Advair HFA) 230-21 MCG/ACT inhaler, Inhale 2 puffs 2 (Two) Times a Day., Disp: 8 g, Rfl: 11  •  ketoconazole (NIZORAL) 2 % shampoo, APPLY 1 APPLICATION ON THE SKIN EVERY OTHER DAY, Disp: , Rfl: 11  •  lisinopril (PRINIVIL,ZESTRIL) 40 MG tablet, Take 40 mg by mouth Daily., Disp: , Rfl:   •  metoprolol tartrate (LOPRESSOR) 50 MG tablet, TAKE 1 TABLET BY MOUTH TWICE A DAY (Patient taking differently: Take 50 mg by mouth 2 (Two) Times a Day.), Disp: 180 tablet, Rfl: 2  •  omeprazole (priLOSEC) 20 MG capsule, TAKE 1 CAPSULE BY MOUTH EVERY DAY (Patient taking differently: Take 20 mg by mouth Daily.), Disp: 30 capsule, Rfl: 0  •  predniSONE (DELTASONE) 10 MG tablet, Take 4 tabs daily x 3 days, then take 3 tabs daily x 3 days, then take 2 tabs daily x 3 days, then take 1 tab daily x 3 days, Disp: 31 tablet, Rfl: 0  •  sildenafil (REVATIO) 20 MG tablet, Take 20 mg by mouth 4 (Four) Times a Day., Disp: , Rfl:   MEDICATION LIST AND ALLERGIES REVIEWED.    Social History     Tobacco Use   • Smoking status: Former Smoker     Packs/day: 1.00     Years: 20.00     Pack years: 20.00     Quit date:      Years since quittin.8   • Smokeless tobacco: Never Used   Vaping Use   • Vaping Use: Never used   Substance Use  "Topics   • Alcohol use: No   • Drug use: No       FAMILY AND SOCIAL HISTORY REVIEWED.    Review of Systems   Constitutional: Positive for fatigue. Negative for activity change, appetite change, fever and unexpected weight change.   HENT: Negative for congestion, postnasal drip, rhinorrhea, sinus pressure, sore throat and voice change.    Eyes: Negative for visual disturbance.   Respiratory: Positive for shortness of breath. Negative for cough, chest tightness and wheezing.    Cardiovascular: Negative for chest pain, palpitations and leg swelling.   Gastrointestinal: Negative for abdominal distention, abdominal pain, nausea and vomiting.   Endocrine: Negative for cold intolerance and heat intolerance.   Genitourinary: Negative for difficulty urinating and urgency.   Musculoskeletal: Negative for arthralgias, back pain and neck pain.   Skin: Negative for color change and pallor.   Allergic/Immunologic: Negative for environmental allergies and food allergies.   Neurological: Negative for dizziness, syncope, weakness and light-headedness.   Hematological: Negative for adenopathy. Does not bruise/bleed easily.   Psychiatric/Behavioral: Negative for agitation and behavioral problems.   .    /92   Pulse 58   Temp 97.5 °F (36.4 °C)   Ht 188 cm (74\")   Wt (!) 168 kg (370 lb)   SpO2 98% Comment: resting, room air  BMI 47.51 kg/m²     Immunization History   Administered Date(s) Administered   • Flu Vaccine Quad PF >18YRS 10/23/2017   • Flu Vaccine Quad PF >36MO 09/23/2021   • Flu Vaccine Split Quad 10/16/2019, 10/27/2020   • FluMist 2-49yrs 11/25/2015   • Hepatitis A 05/31/2018, 11/30/2018   • Influenza Quad Vaccine (Inpatient) 10/23/2017   • Influenza, Unspecified 10/12/2020   • MMR 07/14/1982   • Pneumococcal Conjugate 13-Valent (PCV13) 11/14/2019   • Pneumococcal Polysaccharide (PPSV23) 12/11/2020   • Tdap 09/18/2012   • Varicella 07/14/1982   • Zostavax 06/04/2018   • flucelvax quad pfs =>4 YRS 10/15/2018   • " influenza Split 11/04/2016       Physical Exam  Vitals reviewed.   Constitutional:       Appearance: He is well-developed.   HENT:      Head: Normocephalic and atraumatic.   Eyes:      Pupils: Pupils are equal, round, and reactive to light.   Cardiovascular:      Rate and Rhythm: Normal rate and regular rhythm.      Heart sounds: Normal heart sounds.   Pulmonary:      Effort: Pulmonary effort is normal.      Breath sounds: Normal breath sounds. No wheezing or rales.   Chest:      Chest wall: No tenderness.   Abdominal:      General: Bowel sounds are normal.      Palpations: Abdomen is soft.   Musculoskeletal:         General: No swelling. Normal range of motion.      Cervical back: Normal range of motion and neck supple.   Skin:     General: Skin is warm and dry.      Capillary Refill: Capillary refill takes less than 2 seconds.   Neurological:      Mental Status: He is alert and oriented to person, place, and time.   Psychiatric:         Mood and Affect: Mood normal.         Behavior: Behavior normal.           RESULTS      PROBLEM LIST    Problem List Items Addressed This Visit        Gastrointestinal Abdominal     Gastroesophageal reflux disease - Primary    Overview     Impression: 10/05/2015 - stable  Impression: 02/09/2015 - stable;             Pulmonary and Pneumonias    Shortness of breath    Relevant Medications    albuterol sulfate  (90 Base) MCG/ACT inhaler       Sleep    Severe obstructive sleep apnea intolerant to CPAP      Other Visit Diagnoses     COPD with acute exacerbation (HCC)        Relevant Medications    fluticasone-salmeterol (Advair HFA) 230-21 MCG/ACT inhaler    albuterol sulfate  (90 Base) MCG/ACT inhaler    doxycycline (VIBRAMYICN) 100 MG tablet    predniSONE (DELTASONE) 10 MG tablet            DISCUSSION    Mr. Sosa was here for follow-up of his dyspnea.  He is to have dyspnea and I did encourage him to do some daily physical activity.  We also discussed weight loss in  the office today.    We also discussed treating his severe obstructive sleep apnea he states he does have a mouthguard to wear at his dentist had him fitted for but not every night.    He will continue his Advair twice a day and his Advair albuterol as needed for shortness of breath.  I did send in refills for his inhalers today.    I will treat him for a COPD exacerbation today with antibiotics and steroids.  I did advise him to continue these in their entirety.    Did encourage him to continue taking the omeprazole regularly for his GERD.  We also discussed reflux precautions in the office today.    He will follow-up in 6 months with Dr. Arteaga or sooner if his symptoms worsen.  He will call with any additional concerns or questions.    Level of service justified based on 36 minutes spent in patient care on this date of service including, but not limited to: preparing to see the patient, obtaining and/or reviewing history, performing medically appropriate examination, ordering tests/medicine/procedures, independently interpreting results, documenting clinical information in EHR, and counseling/education of patient/family/caregiver. (Level 4 30-39 minutes; Level 5 40-54 minutes)      Keyonna Patel, LISSA  11/15/988491:03 EST  Electronically signed     Please note that portions of this note were completed with a voice recognition program.        CC: Davon Quinn MD

## 2022-01-21 ENCOUNTER — CLINICAL SUPPORT (OUTPATIENT)
Dept: PULMONOLOGY | Facility: CLINIC | Age: 62
End: 2022-01-21

## 2022-01-21 DIAGNOSIS — J44.1 COPD WITH ACUTE EXACERBATION: Primary | ICD-10-CM

## 2022-01-21 PROCEDURE — 87206 SMEAR FLUORESCENT/ACID STAI: CPT | Performed by: NURSE PRACTITIONER

## 2022-01-21 PROCEDURE — 89220 SPUTUM SPECIMEN COLLECTION: CPT | Performed by: NURSE PRACTITIONER

## 2022-01-21 PROCEDURE — 87116 MYCOBACTERIA CULTURE: CPT | Performed by: NURSE PRACTITIONER

## 2022-01-27 ENCOUNTER — TELEPHONE (OUTPATIENT)
Dept: PULMONOLOGY | Facility: CLINIC | Age: 62
End: 2022-01-27

## 2022-01-27 NOTE — TELEPHONE ENCOUNTER
Patient called to check on lab results from recent sputum. He says his cough is productive with thick, white sputum with the consistency of glue. Wants to know if you'd call in some abx and steroid taper.

## 2022-03-04 LAB
MYCOBACTERIUM SPEC CULT: NORMAL
NIGHT BLUE STAIN TISS: NORMAL

## 2022-08-25 ENCOUNTER — OFFICE VISIT (OUTPATIENT)
Dept: PULMONOLOGY | Facility: CLINIC | Age: 62
End: 2022-08-25

## 2022-08-25 VITALS
DIASTOLIC BLOOD PRESSURE: 90 MMHG | BODY MASS INDEX: 40.43 KG/M2 | RESPIRATION RATE: 16 BRPM | WEIGHT: 315 LBS | TEMPERATURE: 97.3 F | SYSTOLIC BLOOD PRESSURE: 142 MMHG | HEART RATE: 64 BPM | OXYGEN SATURATION: 95 % | HEIGHT: 74 IN

## 2022-08-25 DIAGNOSIS — R06.02 SHORTNESS OF BREATH: Primary | ICD-10-CM

## 2022-08-25 DIAGNOSIS — K21.9 GASTROESOPHAGEAL REFLUX DISEASE, UNSPECIFIED WHETHER ESOPHAGITIS PRESENT: ICD-10-CM

## 2022-08-25 DIAGNOSIS — E66.01 MORBID OBESITY WITH BMI OF 45.0-49.9, ADULT: ICD-10-CM

## 2022-08-25 DIAGNOSIS — G47.33 SEVERE OBSTRUCTIVE SLEEP APNEA: ICD-10-CM

## 2022-08-25 PROCEDURE — 94010 BREATHING CAPACITY TEST: CPT | Performed by: NURSE PRACTITIONER

## 2022-08-25 PROCEDURE — 94729 DIFFUSING CAPACITY: CPT | Performed by: NURSE PRACTITIONER

## 2022-08-25 PROCEDURE — 94726 PLETHYSMOGRAPHY LUNG VOLUMES: CPT | Performed by: NURSE PRACTITIONER

## 2022-08-25 PROCEDURE — 99214 OFFICE O/P EST MOD 30 MIN: CPT | Performed by: NURSE PRACTITIONER

## 2022-08-25 RX ORDER — ALBUTEROL SULFATE 90 UG/1
2 AEROSOL, METERED RESPIRATORY (INHALATION) EVERY 4 HOURS PRN
Qty: 18 G | Refills: 3 | Status: SHIPPED | OUTPATIENT
Start: 2022-08-25

## 2022-08-26 PROBLEM — E66.01 MORBID OBESITY WITH BMI OF 45.0-49.9, ADULT: Status: ACTIVE | Noted: 2022-08-26

## 2022-10-13 ENCOUNTER — TELEPHONE (OUTPATIENT)
Dept: PULMONOLOGY | Facility: CLINIC | Age: 62
End: 2022-10-13

## 2022-10-17 DIAGNOSIS — R06.02 SHORTNESS OF BREATH: Primary | ICD-10-CM

## 2022-10-17 RX ORDER — BUDESONIDE AND FORMOTEROL FUMARATE DIHYDRATE 160; 4.5 UG/1; UG/1
2 AEROSOL RESPIRATORY (INHALATION)
Qty: 10 G | Refills: 11 | Status: SHIPPED | OUTPATIENT
Start: 2022-10-17

## 2023-03-09 ENCOUNTER — OFFICE VISIT (OUTPATIENT)
Dept: PULMONOLOGY | Facility: CLINIC | Age: 63
End: 2023-03-09
Payer: MEDICARE

## 2023-03-09 VITALS
TEMPERATURE: 97.8 F | DIASTOLIC BLOOD PRESSURE: 100 MMHG | HEART RATE: 59 BPM | BODY MASS INDEX: 40.43 KG/M2 | OXYGEN SATURATION: 98 % | SYSTOLIC BLOOD PRESSURE: 162 MMHG | WEIGHT: 315 LBS | HEIGHT: 74 IN

## 2023-03-09 DIAGNOSIS — R06.09 EXERTIONAL DYSPNEA: Primary | ICD-10-CM

## 2023-03-09 PROCEDURE — 3077F SYST BP >= 140 MM HG: CPT | Performed by: INTERNAL MEDICINE

## 2023-03-09 PROCEDURE — 99214 OFFICE O/P EST MOD 30 MIN: CPT | Performed by: INTERNAL MEDICINE

## 2023-03-09 PROCEDURE — 3080F DIAST BP >= 90 MM HG: CPT | Performed by: INTERNAL MEDICINE

## 2023-03-09 RX ORDER — TIOTROPIUM BROMIDE INHALATION SPRAY 3.12 UG/1
2 SPRAY, METERED RESPIRATORY (INHALATION) DAILY
Qty: 1 EACH | Refills: 3 | Status: SHIPPED | OUTPATIENT
Start: 2023-03-09 | End: 2023-04-08

## 2023-03-09 RX ORDER — MOMETASONE FUROATE 1 MG/G
CREAM TOPICAL
COMMUNITY
Start: 2023-02-06

## 2023-03-09 NOTE — PROGRESS NOTES
"PULMONARY FOLLOW-UP OUTPATIENT NOTE:     Patient ID/Chief Complaint: .Margarito Sosa is a 62 y.o. male presenting for follow up on shortness of air.    History of Present Illness: Patient Ian has followed in pulmonary clinic for some time (documented as far back as 2016) for COPD, bronchitis and hemoptysis.  Patient on triple therapy for underlying COPD for many years but remotely stopped taking anticholinergic due to cost.  He currently uses Symbicort daily and albuterol as needed; now approximately 4 times per day.  Patient has had no episodes of hemoptysis >1-year.  He is treated as an outpatient approximately 1-2x yearly for COPD flare with antibiotics and steroids. Over the last 3-6x months patient notes worsening of underlying dyspnea.  In addition to lung disease, he has known cardiovascular issues-- including atrial fibrillation. Last month he was evaluated by cardiologist who performed a lower extremity Doppler and echocardiogram.      In addition to ongoing shortness of breath patient has noticed \"heart racing\" and worsening of lower extremity edema.  He denies mucopurulent secretions, fever, chills, night sweats, rash, lymphadenopathy, syncope, presyncope, chest pain.  Shortness of breath is exertional and not affected by position.    PFSH: Reviewed in EMR, Significant Changes Noted Above    Review of Systems: Fourteen point review of systems performed and negative except for those issues listed in HPI    Medications:  Current Outpatient Medications   Medication Sig Dispense Refill   • albuterol sulfate  (90 Base) MCG/ACT inhaler Inhale 2 puffs Every 4 (Four) Hours As Needed for Wheezing. 18 g 3   • ALPRAZolam (XANAX) 1 MG tablet TAKE 1 TABLET BY MOUTH EVERY DAY (Patient taking differently: Take 1 mg by mouth As Needed.) 30 tablet 2   • budesonide-formoterol (SYMBICORT) 160-4.5 MCG/ACT inhaler Inhale 2 puffs 2 (Two) Times a Day. 10 g 11   • clotrimazole (LOTRIMIN) 1 % cream Apply bid prn " rash (Patient taking differently: Apply  topically to the appropriate area as directed As Needed. Apply bid prn rash) 14 g 1   • cyclobenzaprine (FLEXERIL) 5 MG tablet TAKE 1 TABLET BY MOUTH THREE TIMES DAILY AS NEEDED FOR MUSCLE SPASMS (Patient taking differently: Take 5 mg by mouth 3 (Three) Times a Day As Needed.) 90 tablet 0   • fexofenadine (ALLEGRA) 60 MG tablet Take 180 mg by mouth Daily.     • ketoconazole (NIZORAL) 2 % shampoo APPLY 1 APPLICATION ON THE SKIN EVERY OTHER DAY  11   • lisinopril (PRINIVIL,ZESTRIL) 40 MG tablet Take 40 mg by mouth Daily.     • metoprolol tartrate (LOPRESSOR) 50 MG tablet TAKE 1 TABLET BY MOUTH TWICE A DAY (Patient taking differently: Take 50 mg by mouth 2 (Two) Times a Day.) 180 tablet 2   • omeprazole (priLOSEC) 20 MG capsule TAKE 1 CAPSULE BY MOUTH EVERY DAY (Patient taking differently: Take 20 mg by mouth Daily.) 30 capsule 0   • sildenafil (REVATIO) 20 MG tablet Take 20 mg by mouth 4 (Four) Times a Day.       No current facility-administered medications for this visit.     Immunizations:  Chart reviewed: immunizations are up to date and documented.  Immunization History   Administered Date(s) Administered   • Flu Vaccine Quad PF >36MO 09/23/2021   • Flu Vaccine Split Quad 10/16/2019, 10/27/2020   • FluMist 2-49yrs 11/25/2015   • Fluzone Quad >6mos (Multi-dose) 10/23/2017   • Hepatitis A 05/31/2018, 11/30/2018   • Influenza Quad Vaccine (Inpatient) 10/23/2017   • Influenza, Unspecified 10/12/2020   • MMR 07/14/1982   • Pneumococcal Conjugate 13-Valent (PCV13) 11/14/2019   • Pneumococcal Polysaccharide (PPSV23) 12/11/2020   • Tdap 09/18/2012   • Varicella 07/14/1982   • Zostavax 06/04/2018   • flucelvax quad pfs =>4 YRS 10/15/2018   • influenza Split 11/04/2016     Physical Examination:  Vitals:    03/09/23 1223   BP: 162/100   Pulse: 59   Temp: 97.8 °F (36.6 °C)   SpO2: 98%     General: The patient appears in no acute distress.  Alert, cooperative and interactive.   HEENT:  NC/AT, PERRL, Normal nasal mucosa.   Neck: Trachea midline, No masses.   Lymphadenopathy: No palpable anterior cervical, submandibular, submental, or posterior cervical lymphadenopathy.  No palpable supra- or infraclavicular lymphadenopathy.   Chest: Clear to auscultation bilaterally, No wheezing, rhonchi, or rales.  Normal work of breathing. Equal chest rise.  Cardiac: Regular rhythm, normal rate, S1S2 auscultated. No murmurs, rubs or gallops.    Extremities: 1+ pitting lower extremity edema (L>R). No clubbing or cyanosis.  Neuro: Motor power grossly intact bilaterally.  Sensation intact.  Speech fluid and fluent.  Thought process coherent.  Psych: Alert and oriented x3. Mood stable.    Review of Data:  Laboratory Studies: I personally reviewed recent lab work in EMR    Radiology Results: I have personally reviewed and interpreted the images in EMR    Assessment & Plan:     #COPD [GOLD 2; Group B]  #Exertional dyspnea    -For COPD: Will set goal for triple therapy; provided samples given cost prohibitive use. Quit tobacco >7x years ago.   -Exertional dyspnea likely multifactorial given underlying heart disease, pulmonary disease and morbid obesity.  Spoke with patient at length concerning weight loss, calorie deficits and introduction of exercise.  Exercise tolerance is also severely limited by a plethora of musculoskeletal issues.  We will attempt to obtain records from cardiology given recent lower extremity Doppler and echo.  Patient assumes everything looks normal/stable based on communication with cardiology office.  -Will f/u shortness of breath in 3-months, sooner if needed. Patient notes that even though issue has worsened it's still better than its been in past.  Patient currently optimized from a COPD standpoint and likely the only thing to add is reimaging in the setting of clinical worsening.  It is worth noting that prior CT scan in 2021 and more recent CXR and August 2022 are relatively benign in  terms of parenchymal lung disease    -- Malik Culver MD   Pulmonary/Critical Care    Approximately 35 minutes were spent reviewing pertinent medical/family/social history, performing physical exam, clinically evaluating, interpreting labs/imaging, ordering necessary studies (i.e. medication, tests, procedures) and counseling patient. This does not include time spent with patient by nursing or clerical staff.

## 2023-06-18 DIAGNOSIS — R06.02 SHORTNESS OF BREATH: ICD-10-CM

## 2023-06-19 RX ORDER — ALBUTEROL SULFATE 90 UG/1
AEROSOL, METERED RESPIRATORY (INHALATION)
Qty: 18 G | Refills: 3 | Status: SHIPPED | OUTPATIENT
Start: 2023-06-19

## 2023-10-17 DIAGNOSIS — R06.02 SHORTNESS OF BREATH: ICD-10-CM

## 2023-10-17 RX ORDER — BUDESONIDE AND FORMOTEROL FUMARATE DIHYDRATE 160; 4.5 UG/1; UG/1
2 AEROSOL RESPIRATORY (INHALATION) 2 TIMES DAILY
Qty: 10.2 G | Refills: 2 | Status: SHIPPED | OUTPATIENT
Start: 2023-10-17

## 2024-01-03 DIAGNOSIS — J44.1 COPD WITH ACUTE EXACERBATION: ICD-10-CM

## 2024-01-03 DIAGNOSIS — J42 CHRONIC BRONCHITIS, UNSPECIFIED CHRONIC BRONCHITIS TYPE: Primary | ICD-10-CM

## 2024-01-03 DIAGNOSIS — R06.02 SHORTNESS OF BREATH: ICD-10-CM

## 2024-01-03 RX ORDER — BUDESONIDE AND FORMOTEROL FUMARATE DIHYDRATE 160; 4.5 UG/1; UG/1
2 AEROSOL RESPIRATORY (INHALATION) 2 TIMES DAILY
Qty: 10.2 G | Refills: 2 | Status: SHIPPED | OUTPATIENT
Start: 2024-01-03

## 2024-01-03 RX ORDER — ALBUTEROL SULFATE 90 UG/1
2 AEROSOL, METERED RESPIRATORY (INHALATION) EVERY 4 HOURS PRN
Qty: 18 G | Refills: 3 | Status: SHIPPED | OUTPATIENT
Start: 2024-01-03

## 2024-01-03 NOTE — TELEPHONE ENCOUNTER
Pt called asking for a refill of his albuterol inhaler and his Symbicort 160 inhaler. Sent to [facility] pharmacy.

## 2024-04-14 DIAGNOSIS — R06.02 SHORTNESS OF BREATH: ICD-10-CM

## 2024-04-14 DIAGNOSIS — J42 CHRONIC BRONCHITIS, UNSPECIFIED CHRONIC BRONCHITIS TYPE: ICD-10-CM

## 2024-04-14 DIAGNOSIS — J44.1 COPD WITH ACUTE EXACERBATION: ICD-10-CM

## 2024-04-15 RX ORDER — BUDESONIDE AND FORMOTEROL FUMARATE DIHYDRATE 160; 4.5 UG/1; UG/1
2 AEROSOL RESPIRATORY (INHALATION) 2 TIMES DAILY
Qty: 10.2 G | Refills: 2 | OUTPATIENT
Start: 2024-04-15

## 2024-11-11 DIAGNOSIS — J42 CHRONIC BRONCHITIS, UNSPECIFIED CHRONIC BRONCHITIS TYPE: ICD-10-CM

## 2024-11-11 DIAGNOSIS — J44.1 COPD WITH ACUTE EXACERBATION: ICD-10-CM

## 2024-11-11 DIAGNOSIS — R06.02 SHORTNESS OF BREATH: ICD-10-CM

## 2024-11-11 RX ORDER — BUDESONIDE AND FORMOTEROL FUMARATE DIHYDRATE 160; 4.5 UG/1; UG/1
2 AEROSOL RESPIRATORY (INHALATION) 2 TIMES DAILY
Qty: 10.2 G | Refills: 2 | OUTPATIENT
Start: 2024-11-11

## (undated) DEVICE — CATH FOL BARDEX HEMATURIA 3WY 22F 30CC

## (undated) DEVICE — CVR TRANSD NEOGUARD 2X30CM LF STRL

## (undated) DEVICE — GLV SURG SENSICARE PI MIC PF SZ7.5 LF STRL

## (undated) DEVICE — ST PRIM GRVTY NDLESS 3 INJ PORT 105IN

## (undated) DEVICE — PK CYSTO-TUR BASIC 10

## (undated) DEVICE — STERILE ULTRASOUND GEL, SAFEWRAP: Brand: ECOVUE